# Patient Record
Sex: MALE | Race: WHITE | NOT HISPANIC OR LATINO | Employment: OTHER | URBAN - METROPOLITAN AREA
[De-identification: names, ages, dates, MRNs, and addresses within clinical notes are randomized per-mention and may not be internally consistent; named-entity substitution may affect disease eponyms.]

---

## 2017-04-06 ENCOUNTER — HISTORICAL (OUTPATIENT)
Dept: ADMINISTRATIVE | Facility: HOSPITAL | Age: 57
End: 2017-04-06

## 2017-06-27 ENCOUNTER — HISTORICAL (OUTPATIENT)
Dept: ADMINISTRATIVE | Facility: HOSPITAL | Age: 57
End: 2017-06-27

## 2017-06-27 LAB
ABS NEUT (OLG): 2.63 X10(3)/MCL (ref 2.1–9.2)
ABS NEUT (OLG): 2.71 X10(3)/MCL (ref 2.1–9.2)
ALBUMIN SERPL-MCNC: 4.4 GM/DL (ref 3.4–5)
ALBUMIN/GLOB SERPL: 1 RATIO (ref 1–2)
ALP SERPL-CCNC: 81 UNIT/L (ref 20–120)
ALT SERPL-CCNC: 19 UNIT/L
AST SERPL-CCNC: 20 UNIT/L
BASOPHILS # BLD AUTO: 0.02 X10(3)/MCL
BASOPHILS # BLD AUTO: 0.02 X10(3)/MCL
BASOPHILS NFR BLD AUTO: 0 % (ref 0–1)
BASOPHILS NFR BLD AUTO: 0 % (ref 0–1)
BILIRUB SERPL-MCNC: 0.4 MG/DL
BILIRUBIN DIRECT+TOT PNL SERPL-MCNC: 0.1 MG/DL
BILIRUBIN DIRECT+TOT PNL SERPL-MCNC: 0.3 MG/DL
BUN SERPL-MCNC: 24 MG/DL (ref 7–25)
CALCIUM SERPL-MCNC: 9.6 MG/DL (ref 8.4–10.3)
CD3+CD4+ CELLS # SPEC: 868 UNIT/L (ref 589–1505)
CD3+CD4+ CELLS NFR BLD: 36.8 % (ref 31–59)
CHLORIDE SERPL-SCNC: 100 MMOL/L (ref 96–110)
CO2 SERPL-SCNC: 26 MMOL/L (ref 24–32)
CREAT SERPL-MCNC: 0.78 MG/DL (ref 0.7–1.4)
EOSINOPHIL # BLD AUTO: 0.15 X10(3)/MCL
EOSINOPHIL # BLD AUTO: 0.16 X10(3)/MCL
EOSINOPHIL NFR BLD AUTO: 3 % (ref 0–5)
EOSINOPHIL NFR BLD AUTO: 3 % (ref 0–5)
ERYTHROCYTE [DISTWIDTH] IN BLOOD BY AUTOMATED COUNT: 11.3 % (ref 11.5–14.5)
ERYTHROCYTE [DISTWIDTH] IN BLOOD BY AUTOMATED COUNT: 11.6 % (ref 11.5–14.5)
GLOBULIN SER-MCNC: 3.5 GM/ML (ref 2.3–3.5)
GLUCOSE SERPL-MCNC: 101 MG/DL (ref 65–99)
HAV AB SER QL IA: REACTIVE
HCT VFR BLD AUTO: 41 % (ref 40–51)
HCT VFR BLD AUTO: 41.1 % (ref 40–51)
HGB BLD-MCNC: 14.6 GM/DL (ref 13.5–17.5)
HGB BLD-MCNC: 14.6 GM/DL (ref 13.5–17.5)
IMM GRANULOCYTES # BLD AUTO: 0.01 10*3/UL
IMM GRANULOCYTES NFR BLD AUTO: 0 %
LYMPHOCYTES # BLD AUTO: 2.36 X10(3)/MCL
LYMPHOCYTES # BLD AUTO: 2.36 X10(3)/MCL
LYMPHOCYTES # BLD AUTO: 2360 UNIT/L (ref 1260–5520)
LYMPHOCYTES NFR BLD AUTO: 40 % (ref 15–40)
LYMPHOCYTES NFR BLD AUTO: 40 % (ref 15–40)
LYMPHOCYTES NFR LN MANUAL: 40 % (ref 28–48)
LYMPHOMA - T-CELL MARKERS SPEC-IMP: NORMAL
MCH RBC QN AUTO: 40.2 PG (ref 26–34)
MCH RBC QN AUTO: 40.3 PG (ref 26–34)
MCHC RBC AUTO-ENTMCNC: 35.5 GM/DL (ref 31–37)
MCHC RBC AUTO-ENTMCNC: 35.6 GM/DL (ref 31–37)
MCV RBC AUTO: 113.2 FL (ref 80–100)
MCV RBC AUTO: 113.3 FL (ref 80–100)
MONOCYTES # BLD AUTO: 0.6 X10(3)/MCL
MONOCYTES # BLD AUTO: 0.67 X10(3)/MCL
MONOCYTES NFR BLD AUTO: 10 % (ref 4–12)
MONOCYTES NFR BLD AUTO: 12 % (ref 4–12)
NEUTROPHILS # BLD AUTO: 2.63 X10(3)/MCL
NEUTROPHILS # BLD AUTO: 2.71 X10(3)/MCL
NEUTROPHILS NFR BLD AUTO: 45 X10(3)/MCL
NEUTROPHILS NFR BLD AUTO: 46 X10(3)/MCL
PLATELET # BLD AUTO: 229 X10(3)/MCL (ref 130–400)
PLATELET # BLD AUTO: 231 X10(3)/MCL (ref 130–400)
PMV BLD AUTO: 9.1 FL (ref 7.4–10.4)
PMV BLD AUTO: 9.1 FL (ref 7.4–10.4)
POTASSIUM SERPL-SCNC: 4.3 MMOL/L (ref 3.6–5.2)
PROT SERPL-MCNC: 7.9 GM/DL (ref 6–8)
RBC # BLD AUTO: 3.62 X10(6)/MCL (ref 4.5–5.9)
RBC # BLD AUTO: 3.63 X10(6)/MCL (ref 4.5–5.9)
SODIUM SERPL-SCNC: 135 MMOL/L (ref 135–146)
WBC # BLD AUTO: 5900 /MM3 (ref 4500–11500)
WBC # SPEC AUTO: 5.8 X10(3)/MCL (ref 4.5–11)
WBC # SPEC AUTO: 5.9 X10(3)/MCL (ref 4.5–11)

## 2017-10-20 ENCOUNTER — HISTORICAL (OUTPATIENT)
Dept: INTERNAL MEDICINE | Facility: CLINIC | Age: 57
End: 2017-10-20

## 2017-10-20 LAB
BUN SERPL-MCNC: 24 MG/DL (ref 7–18)
CALCIUM SERPL-MCNC: 9.5 MG/DL (ref 8.5–10.1)
CHLORIDE SERPL-SCNC: 101 MMOL/L (ref 98–107)
CHOLEST SERPL-MCNC: 247 MG/DL
CHOLEST/HDLC SERPL: 4.9 {RATIO} (ref 0–5)
CO2 SERPL-SCNC: 27 MMOL/L (ref 21–32)
CREAT SERPL-MCNC: 0.9 MG/DL (ref 0.6–1.3)
DEPRECATED CALCIDIOL+CALCIFEROL SERPL-MC: 27.82 NG/ML (ref 30–80)
EST. AVERAGE GLUCOSE BLD GHB EST-MCNC: 114 MG/DL
GLUCOSE SERPL-MCNC: 98 MG/DL (ref 74–106)
HBA1C MFR BLD: 5.6 % (ref 4.2–6.3)
HDLC SERPL-MCNC: 50 MG/DL
LDLC SERPL CALC-MCNC: 146 MG/DL (ref 0–130)
POTASSIUM SERPL-SCNC: 4.4 MMOL/L (ref 3.5–5.1)
PSA SERPL-MCNC: 1 NG/ML
SODIUM SERPL-SCNC: 136 MMOL/L (ref 136–145)
TESTOST SERPL-MCNC: 287.1 NG/DL (ref 241–827)
TRIGL SERPL-MCNC: 256 MG/DL
VLDLC SERPL CALC-MCNC: 51 MG/DL

## 2017-10-25 ENCOUNTER — HISTORICAL (OUTPATIENT)
Dept: ADMINISTRATIVE | Facility: HOSPITAL | Age: 57
End: 2017-10-25

## 2017-10-25 LAB
ABS NEUT (OLG): 2.85 X10(3)/MCL (ref 2.1–9.2)
ABS NEUT (OLG): 3.05 X10(3)/MCL (ref 2.1–9.2)
ALBUMIN SERPL-MCNC: 4.5 GM/DL (ref 3.4–5)
ALBUMIN/GLOB SERPL: 1 RATIO (ref 1–2)
ALP SERPL-CCNC: 111 UNIT/L (ref 45–117)
ALT SERPL-CCNC: 30 UNIT/L (ref 12–78)
APPEARANCE, UA: CLEAR
AST SERPL-CCNC: 18 UNIT/L (ref 15–37)
BACTERIA #/AREA URNS AUTO: ABNORMAL /[HPF]
BASOPHILS # BLD AUTO: 0.02 X10(3)/MCL
BASOPHILS # BLD AUTO: 0.02 X10(3)/MCL
BASOPHILS NFR BLD AUTO: 0 % (ref 0–1)
BASOPHILS NFR BLD AUTO: 0 % (ref 0–1)
BILIRUB SERPL-MCNC: 0.4 MG/DL (ref 0.2–1)
BILIRUB UR QL STRIP: NEGATIVE
BILIRUBIN DIRECT+TOT PNL SERPL-MCNC: 0.1 MG/DL
BILIRUBIN DIRECT+TOT PNL SERPL-MCNC: 0.3 MG/DL
BUN SERPL-MCNC: 15 MG/DL (ref 7–18)
CALCIUM SERPL-MCNC: 9.5 MG/DL (ref 8.5–10.1)
CD3+CD4+ CELLS # SPEC: 1016 UNIT/L (ref 589–1505)
CD3+CD4+ CELLS NFR BLD: 38.5 % (ref 31–59)
CHLORIDE SERPL-SCNC: 102 MMOL/L (ref 98–107)
CO2 SERPL-SCNC: 27 MMOL/L (ref 21–32)
COLOR UR: ABNORMAL
CREAT SERPL-MCNC: 0.8 MG/DL (ref 0.6–1.3)
EOSINOPHIL # BLD AUTO: 0.21 X10(3)/MCL
EOSINOPHIL # BLD AUTO: 0.22 X10(3)/MCL
EOSINOPHIL NFR BLD AUTO: 3 % (ref 0–5)
EOSINOPHIL NFR BLD AUTO: 4 % (ref 0–5)
ERYTHROCYTE [DISTWIDTH] IN BLOOD BY AUTOMATED COUNT: 11.4 % (ref 11.5–14.5)
ERYTHROCYTE [DISTWIDTH] IN BLOOD BY AUTOMATED COUNT: 11.4 % (ref 11.5–14.5)
GLOBULIN SER-MCNC: 4.2 GM/ML (ref 2.3–3.5)
GLUCOSE (UA): NORMAL
GLUCOSE SERPL-MCNC: 99 MG/DL (ref 74–106)
HCT VFR BLD AUTO: 46 % (ref 40–51)
HCT VFR BLD AUTO: 46.2 % (ref 40–51)
HCV AB SERPL QL IA: NONREACTIVE
HGB BLD-MCNC: 16.8 GM/DL (ref 13.5–17.5)
HGB BLD-MCNC: 16.9 GM/DL (ref 13.5–17.5)
HGB UR QL STRIP: NEGATIVE
HYALINE CASTS #/AREA URNS LPF: ABNORMAL /[LPF]
IMM GRANULOCYTES # BLD AUTO: 0.01 10*3/UL
IMM GRANULOCYTES # BLD AUTO: 0.01 10*3/UL
IMM GRANULOCYTES NFR BLD AUTO: 0 %
IMM GRANULOCYTES NFR BLD AUTO: 0 %
KETONES UR QL STRIP: NEGATIVE
LEUKOCYTE ESTERASE UR QL STRIP: NEGATIVE
LYMPHOCYTES # BLD AUTO: 2.38 X10(3)/MCL
LYMPHOCYTES # BLD AUTO: 2.64 X10(3)/MCL
LYMPHOCYTES # BLD AUTO: 2640 UNIT/L (ref 1260–5520)
LYMPHOCYTES NFR BLD AUTO: 39 % (ref 15–40)
LYMPHOCYTES NFR BLD AUTO: 40 % (ref 15–40)
LYMPHOCYTES NFR LN MANUAL: 40 % (ref 28–48)
LYMPHOMA - T-CELL MARKERS SPEC-IMP: NORMAL
MCH RBC QN AUTO: 41.5 PG (ref 26–34)
MCH RBC QN AUTO: 41.9 PG (ref 26–34)
MCHC RBC AUTO-ENTMCNC: 36.4 GM/DL (ref 31–37)
MCHC RBC AUTO-ENTMCNC: 36.7 GM/DL (ref 31–37)
MCV RBC AUTO: 114.1 FL (ref 80–100)
MCV RBC AUTO: 114.1 FL (ref 80–100)
MONOCYTES # BLD AUTO: 0.62 X10(3)/MCL
MONOCYTES # BLD AUTO: 0.71 X10(3)/MCL
MONOCYTES NFR BLD AUTO: 10 % (ref 4–12)
MONOCYTES NFR BLD AUTO: 11 % (ref 4–12)
NEUTROPHILS # BLD AUTO: 2.85 X10(3)/MCL
NEUTROPHILS # BLD AUTO: 3.05 X10(3)/MCL
NEUTROPHILS NFR BLD AUTO: 46 X10(3)/MCL
NEUTROPHILS NFR BLD AUTO: 47 X10(3)/MCL
NITRITE UR QL STRIP: NEGATIVE
PH UR STRIP: 5.5 [PH] (ref 4.5–8)
PLATELET # BLD AUTO: 209 X10(3)/MCL (ref 130–400)
PLATELET # BLD AUTO: 215 X10(3)/MCL (ref 130–400)
PMV BLD AUTO: 9.3 FL (ref 7.4–10.4)
PMV BLD AUTO: 9.4 FL (ref 7.4–10.4)
POTASSIUM SERPL-SCNC: 4 MMOL/L (ref 3.5–5.1)
PROT SERPL-MCNC: 8.7 GM/DL (ref 6.4–8.2)
PROT UR QL STRIP: NEGATIVE
RBC # BLD AUTO: 4.03 X10(6)/MCL (ref 4.5–5.9)
RBC # BLD AUTO: 4.05 X10(6)/MCL (ref 4.5–5.9)
RBC #/AREA URNS AUTO: ABNORMAL /[HPF]
SODIUM SERPL-SCNC: 134 MMOL/L (ref 136–145)
SP GR UR STRIP: 1 (ref 1–1.03)
SQUAMOUS #/AREA URNS LPF: ABNORMAL /[LPF]
T PALLIDUM AB SER QL: NONREACTIVE
TSH SERPL-ACNC: 1.16 MIU/L (ref 0.36–3.74)
UROBILINOGEN UR STRIP-ACNC: NORMAL MG/DL
WBC # BLD AUTO: 6600 /MM3 (ref 4500–11500)
WBC # SPEC AUTO: 6.1 X10(3)/MCL (ref 4.5–11)
WBC # SPEC AUTO: 6.6 X10(3)/MCL (ref 4.5–11)
WBC #/AREA URNS AUTO: ABNORMAL /HPF

## 2018-03-01 ENCOUNTER — HISTORICAL (OUTPATIENT)
Dept: ADMINISTRATIVE | Facility: HOSPITAL | Age: 58
End: 2018-03-01

## 2018-03-01 LAB
ABS NEUT (OLG): 2.52 X10(3)/MCL (ref 2.1–9.2)
ABS NEUT (OLG): 2.57 X10(3)/MCL (ref 2.1–9.2)
ALBUMIN SERPL-MCNC: 3.8 GM/DL (ref 3.4–5)
ALBUMIN/GLOB SERPL: 1 RATIO (ref 1–2)
ALP SERPL-CCNC: 89 UNIT/L (ref 45–117)
ALT SERPL-CCNC: 26 UNIT/L (ref 12–78)
AST SERPL-CCNC: 17 UNIT/L (ref 15–37)
BASOPHILS # BLD AUTO: 0.02 X10(3)/MCL
BASOPHILS # BLD AUTO: 0.02 X10(3)/MCL
BASOPHILS NFR BLD AUTO: 0 %
BASOPHILS NFR BLD AUTO: 0 %
BILIRUB SERPL-MCNC: 0.4 MG/DL (ref 0.2–1)
BILIRUBIN DIRECT+TOT PNL SERPL-MCNC: <0.1 MG/DL
BILIRUBIN DIRECT+TOT PNL SERPL-MCNC: ABNORMAL MG/DL
BUN SERPL-MCNC: 12 MG/DL (ref 7–18)
CALCIUM SERPL-MCNC: 9.1 MG/DL (ref 8.5–10.1)
CD3+CD4+ CELLS # SPEC: 802 UNIT/L (ref 589–1505)
CD3+CD4+ CELLS NFR BLD: 37.4 % (ref 31–59)
CHLORIDE SERPL-SCNC: 104 MMOL/L (ref 98–107)
CO2 SERPL-SCNC: 27 MMOL/L (ref 21–32)
CREAT SERPL-MCNC: 0.8 MG/DL (ref 0.6–1.3)
EOSINOPHIL # BLD AUTO: 0.14 X10(3)/MCL
EOSINOPHIL # BLD AUTO: 0.15 10*3/UL
EOSINOPHIL NFR BLD AUTO: 3 %
EOSINOPHIL NFR BLD AUTO: 3 %
ERYTHROCYTE [DISTWIDTH] IN BLOOD BY AUTOMATED COUNT: 12.5 % (ref 11.5–14.5)
ERYTHROCYTE [DISTWIDTH] IN BLOOD BY AUTOMATED COUNT: 12.6 % (ref 11.5–14.5)
GLOBULIN SER-MCNC: 3.8 GM/ML (ref 2.3–3.5)
GLUCOSE SERPL-MCNC: 87 MG/DL (ref 74–106)
HCT VFR BLD AUTO: 40.2 % (ref 40–51)
HCT VFR BLD AUTO: 40.3 % (ref 40–51)
HGB BLD-MCNC: 14.3 GM/DL (ref 13.5–17.5)
HGB BLD-MCNC: 14.4 GM/DL (ref 13.5–17.5)
IMM GRANULOCYTES # BLD AUTO: 0.01 10*3/UL
IMM GRANULOCYTES # BLD AUTO: 0.01 10*3/UL
IMM GRANULOCYTES NFR BLD AUTO: 0 %
IMM GRANULOCYTES NFR BLD AUTO: 0 %
LYMPHOCYTES # BLD AUTO: 2.12 X10(3)/MCL
LYMPHOCYTES # BLD AUTO: 2.15 X10(3)/MCL
LYMPHOCYTES # BLD AUTO: 2145 UNIT/L (ref 1260–5520)
LYMPHOCYTES NFR BLD AUTO: 39 % (ref 13–40)
LYMPHOCYTES NFR BLD AUTO: 40 % (ref 13–40)
LYMPHOCYTES NFR LN MANUAL: 39 % (ref 28–48)
LYMPHOMA - T-CELL MARKERS SPEC-IMP: NORMAL
MCH RBC QN AUTO: 41.6 PG (ref 26–34)
MCH RBC QN AUTO: 41.7 PG (ref 26–34)
MCHC RBC AUTO-ENTMCNC: 35.6 GM/DL (ref 31–37)
MCHC RBC AUTO-ENTMCNC: 35.7 GM/DL (ref 31–37)
MCV RBC AUTO: 116.8 FL (ref 80–100)
MCV RBC AUTO: 116.9 FL (ref 80–100)
MONOCYTES # BLD AUTO: 0.51 X10(3)/MCL
MONOCYTES # BLD AUTO: 0.58 X10(3)/MCL
MONOCYTES NFR BLD AUTO: 10 % (ref 4–12)
MONOCYTES NFR BLD AUTO: 11 % (ref 4–12)
NEUTROPHILS # BLD AUTO: 2.52 X10(3)/MCL
NEUTROPHILS # BLD AUTO: 2.57 X10(3)/MCL
NEUTROPHILS NFR BLD AUTO: 47 X10(3)/MCL
NEUTROPHILS NFR BLD AUTO: 47 X10(3)/MCL
PLATELET # BLD AUTO: 215 X10(3)/MCL (ref 130–400)
PLATELET # BLD AUTO: 217 X10(3)/MCL (ref 130–400)
PMV BLD AUTO: 9.5 FL (ref 7.4–10.4)
PMV BLD AUTO: 9.7 FL (ref 7.4–10.4)
POTASSIUM SERPL-SCNC: 4.2 MMOL/L (ref 3.5–5.1)
PROT SERPL-MCNC: 7.6 GM/DL (ref 6.4–8.2)
RBC # BLD AUTO: 3.44 X10(6)/MCL (ref 4.5–5.9)
RBC # BLD AUTO: 3.45 X10(6)/MCL (ref 4.5–5.9)
SODIUM SERPL-SCNC: 139 MMOL/L (ref 136–145)
WBC # BLD AUTO: 5500 /MM3 (ref 4500–11500)
WBC # SPEC AUTO: 5.3 X10(3)/MCL (ref 4.5–11)
WBC # SPEC AUTO: 5.5 X10(3)/MCL (ref 4.5–11)

## 2018-06-15 ENCOUNTER — HISTORICAL (OUTPATIENT)
Dept: INTERNAL MEDICINE | Facility: CLINIC | Age: 58
End: 2018-06-15

## 2018-06-15 LAB
CHOLEST SERPL-MCNC: 252 MG/DL
CHOLEST/HDLC SERPL: 6 {RATIO} (ref 0–5)
HDLC SERPL-MCNC: 42 MG/DL
LDLC SERPL CALC-MCNC: 159 MG/DL (ref 0–130)
TRIGL SERPL-MCNC: 257 MG/DL
VLDLC SERPL CALC-MCNC: 51 MG/DL

## 2018-08-30 ENCOUNTER — HISTORICAL (OUTPATIENT)
Dept: ADMINISTRATIVE | Facility: HOSPITAL | Age: 58
End: 2018-08-30

## 2018-08-30 LAB
ABS NEUT (OLG): 2.7 X10(3)/MCL
ALBUMIN SERPL-MCNC: 4 GM/DL (ref 3.4–5)
ALBUMIN/GLOB SERPL: 1 RATIO (ref 1–2)
ALP SERPL-CCNC: 91 UNIT/L (ref 45–117)
ALT SERPL-CCNC: 22 UNIT/L (ref 12–78)
APPEARANCE, UA: CLEAR
AST SERPL-CCNC: 14 UNIT/L (ref 15–37)
BACTERIA #/AREA URNS AUTO: ABNORMAL /[HPF]
BASOPHILS # BLD AUTO: 0.02 X10(3)/MCL
BASOPHILS NFR BLD AUTO: 0 %
BILIRUB SERPL-MCNC: 0.4 MG/DL (ref 0.2–1)
BILIRUB UR QL STRIP: NEGATIVE
BILIRUBIN DIRECT+TOT PNL SERPL-MCNC: 0.1 MG/DL
BILIRUBIN DIRECT+TOT PNL SERPL-MCNC: 0.3 MG/DL
BUN SERPL-MCNC: 15 MG/DL (ref 7–18)
CALCIUM SERPL-MCNC: 8.8 MG/DL (ref 8.5–10.1)
CD3+CD4+ CELLS # SPEC: 681 UNIT/L (ref 589–1505)
CD3+CD4+ CELLS NFR BLD: 37.4 % (ref 31–59)
CHLORIDE SERPL-SCNC: 103 MMOL/L (ref 98–107)
CO2 SERPL-SCNC: 27 MMOL/L (ref 21–32)
COLOR UR: COLORLESS
CREAT SERPL-MCNC: 0.8 MG/DL (ref 0.6–1.3)
EOSINOPHIL # BLD AUTO: 0.09 X10(3)/MCL
EOSINOPHIL NFR BLD AUTO: 2 %
ERYTHROCYTE [DISTWIDTH] IN BLOOD BY AUTOMATED COUNT: 11.5 % (ref 11.5–14.5)
GLOBULIN SER-MCNC: 3.6 GM/ML (ref 2.3–3.5)
GLUCOSE (UA): NORMAL
GLUCOSE SERPL-MCNC: 110 MG/DL (ref 74–106)
HCT VFR BLD AUTO: 42.4 % (ref 40–51)
HGB BLD-MCNC: 15.2 GM/DL (ref 13.5–17.5)
HGB UR QL STRIP: NEGATIVE
HYALINE CASTS #/AREA URNS LPF: ABNORMAL /[LPF]
HYPOCHROMIA BLD QL SMEAR: NORMAL
IMM GRANULOCYTES # BLD AUTO: 0.01 10*3/UL
IMM GRANULOCYTES NFR BLD AUTO: 0 %
KETONES UR QL STRIP: NEGATIVE
LEUKOCYTE ESTERASE UR QL STRIP: NEGATIVE
LYMPHOCYTES # BLD AUTO: 1.84 X10(3)/MCL
LYMPHOCYTES # BLD AUTO: 1820 UNIT/L (ref 1260–5520)
LYMPHOCYTES NFR BLD AUTO: 35 % (ref 13–40)
LYMPHOCYTES NFR LN MANUAL: 35 % (ref 28–48)
LYMPHOMA - T-CELL MARKERS SPEC-IMP: NORMAL
MACROCYTES BLD QL SMEAR: NORMAL
MCH RBC QN AUTO: 42 PG (ref 26–34)
MCHC RBC AUTO-ENTMCNC: 35.8 GM/DL (ref 31–37)
MCV RBC AUTO: 117.1 FL (ref 80–100)
MONOCYTES # BLD AUTO: 0.54 X10(3)/MCL
MONOCYTES NFR BLD AUTO: 10 % (ref 4–12)
NEUTROPHILS # BLD AUTO: 2.7 X10(3)/MCL
NEUTROPHILS NFR BLD AUTO: 52 %
NITRITE UR QL STRIP: NEGATIVE
PH UR STRIP: 5.5 [PH] (ref 4.5–8)
PLATELET # BLD AUTO: 209 X10(3)/MCL (ref 130–400)
PLATELET # BLD EST: ADEQUATE 10*3/UL
PMV BLD AUTO: 9.9 FL (ref 7.4–10.4)
POLYCHROMASIA BLD QL SMEAR: NORMAL
POTASSIUM SERPL-SCNC: 3.8 MMOL/L (ref 3.5–5.1)
PROT SERPL-MCNC: 7.6 GM/DL (ref 6.4–8.2)
PROT UR QL STRIP: NEGATIVE
RBC # BLD AUTO: 3.62 X10(6)/MCL (ref 4.5–5.9)
RBC #/AREA URNS AUTO: ABNORMAL /[HPF]
RBC MORPH BLD: NORMAL
SODIUM SERPL-SCNC: 137 MMOL/L (ref 136–145)
SP GR UR STRIP: 1 (ref 1–1.03)
SQUAMOUS #/AREA URNS LPF: <1 /[LPF]
T PALLIDUM AB SER QL: NONREACTIVE
UROBILINOGEN UR STRIP-ACNC: NORMAL
WBC # BLD AUTO: 5200 /MM3 (ref 4500–11500)
WBC # SPEC AUTO: 5.2 X10(3)/MCL (ref 4.5–11)
WBC #/AREA URNS AUTO: ABNORMAL /HPF

## 2018-11-21 ENCOUNTER — HISTORICAL (OUTPATIENT)
Dept: LAB | Facility: HOSPITAL | Age: 58
End: 2018-11-21

## 2018-11-21 LAB
ALBUMIN SERPL-MCNC: 4.2 GM/DL (ref 3.4–5)
ALBUMIN/GLOB SERPL: 1 RATIO (ref 1–2)
ALP SERPL-CCNC: 113 UNIT/L (ref 45–117)
ALT SERPL-CCNC: 27 UNIT/L (ref 12–78)
AST SERPL-CCNC: 12 UNIT/L (ref 15–37)
BILIRUB SERPL-MCNC: 0.5 MG/DL (ref 0.2–1)
BILIRUBIN DIRECT+TOT PNL SERPL-MCNC: 0.1 MG/DL
BILIRUBIN DIRECT+TOT PNL SERPL-MCNC: 0.4 MG/DL
BUN SERPL-MCNC: 21 MG/DL (ref 7–18)
CALCIUM SERPL-MCNC: 9.1 MG/DL (ref 8.5–10.1)
CHLORIDE SERPL-SCNC: 102 MMOL/L (ref 98–107)
CHOLEST SERPL-MCNC: 212 MG/DL
CHOLEST/HDLC SERPL: 5 {RATIO} (ref 0–5)
CO2 SERPL-SCNC: 27 MMOL/L (ref 21–32)
CREAT SERPL-MCNC: 0.9 MG/DL (ref 0.6–1.3)
GLOBULIN SER-MCNC: 3.8 GM/ML (ref 2.3–3.5)
GLUCOSE SERPL-MCNC: 102 MG/DL (ref 74–106)
HDLC SERPL-MCNC: 42 MG/DL
LDLC SERPL CALC-MCNC: 144 MG/DL (ref 0–130)
POTASSIUM SERPL-SCNC: 4.1 MMOL/L (ref 3.5–5.1)
PROT SERPL-MCNC: 8 GM/DL (ref 6.4–8.2)
PSA SERPL-MCNC: 1 NG/ML
SODIUM SERPL-SCNC: 137 MMOL/L (ref 136–145)
TESTOST SERPL-MCNC: 325 NG/DL (ref 241–827)
TRIGL SERPL-MCNC: 130 MG/DL
VLDLC SERPL CALC-MCNC: 26 MG/DL

## 2018-12-20 ENCOUNTER — HISTORICAL (OUTPATIENT)
Dept: ADMINISTRATIVE | Facility: HOSPITAL | Age: 58
End: 2018-12-20

## 2018-12-20 LAB
ABS NEUT (OLG): 2.49 X10(3)/MCL (ref 2.1–9.2)
ALBUMIN SERPL-MCNC: 4 GM/DL (ref 3.4–5)
ALBUMIN/GLOB SERPL: 1 RATIO (ref 1–2)
ALP SERPL-CCNC: 118 UNIT/L (ref 45–117)
ALT SERPL-CCNC: 44 UNIT/L (ref 12–78)
AST SERPL-CCNC: 28 UNIT/L (ref 15–37)
BASOPHILS # BLD AUTO: 0.02 X10(3)/MCL
BASOPHILS NFR BLD AUTO: 0 %
BILIRUB SERPL-MCNC: 0.3 MG/DL (ref 0.2–1)
BILIRUBIN DIRECT+TOT PNL SERPL-MCNC: 0.1 MG/DL
BILIRUBIN DIRECT+TOT PNL SERPL-MCNC: 0.2 MG/DL
BUN SERPL-MCNC: 20 MG/DL (ref 7–18)
CALCIUM SERPL-MCNC: 9.1 MG/DL (ref 8.5–10.1)
CD3+CD4+ CELLS # SPEC: 760 UNIT/L (ref 589–1505)
CD3+CD4+ CELLS NFR BLD: 36.1 % (ref 31–59)
CHLORIDE SERPL-SCNC: 102 MMOL/L (ref 98–107)
CO2 SERPL-SCNC: 26 MMOL/L (ref 21–32)
CREAT SERPL-MCNC: 0.8 MG/DL (ref 0.6–1.3)
EOSINOPHIL # BLD AUTO: 0.12 10*3/UL
EOSINOPHIL NFR BLD AUTO: 2 %
ERYTHROCYTE [DISTWIDTH] IN BLOOD BY AUTOMATED COUNT: 11.8 % (ref 11.5–14.5)
GLOBULIN SER-MCNC: 3.7 GM/ML (ref 2.3–3.5)
GLUCOSE SERPL-MCNC: 82 MG/DL (ref 74–106)
HCT VFR BLD AUTO: 43.4 % (ref 40–51)
HGB BLD-MCNC: 15.3 GM/DL (ref 13.5–17.5)
IMM GRANULOCYTES # BLD AUTO: 0.02 10*3/UL
IMM GRANULOCYTES NFR BLD AUTO: 0 %
LYMPHOCYTES # BLD AUTO: 2.1 X10(3)/MCL
LYMPHOCYTES # BLD AUTO: 2106 UNIT/L (ref 1260–5520)
LYMPHOCYTES NFR BLD AUTO: 39 % (ref 13–40)
LYMPHOCYTES NFR LN MANUAL: 39 % (ref 28–48)
LYMPHOMA - T-CELL MARKERS SPEC-IMP: NORMAL
MCH RBC QN AUTO: 41 PG (ref 26–34)
MCHC RBC AUTO-ENTMCNC: 35.3 GM/DL (ref 31–37)
MCV RBC AUTO: 116.4 FL (ref 80–100)
MONOCYTES # BLD AUTO: 0.63 X10(3)/MCL
MONOCYTES NFR BLD AUTO: 12 % (ref 4–12)
NEUTROPHILS # BLD AUTO: 2.49 X10(3)/MCL
NEUTROPHILS NFR BLD AUTO: 46 X10(3)/MCL
PLATELET # BLD AUTO: 266 X10(3)/MCL (ref 130–400)
PMV BLD AUTO: 9.5 FL (ref 7.4–10.4)
POTASSIUM SERPL-SCNC: 3.9 MMOL/L (ref 3.5–5.1)
PROT SERPL-MCNC: 7.7 GM/DL (ref 6.4–8.2)
RBC # BLD AUTO: 3.73 X10(6)/MCL (ref 4.5–5.9)
SODIUM SERPL-SCNC: 137 MMOL/L (ref 136–145)
WBC # BLD AUTO: 5400 /MM3 (ref 4500–11500)
WBC # SPEC AUTO: 5.4 X10(3)/MCL (ref 4.5–11)

## 2019-02-01 ENCOUNTER — HISTORICAL (OUTPATIENT)
Dept: ADMINISTRATIVE | Facility: HOSPITAL | Age: 59
End: 2019-02-01

## 2019-06-20 ENCOUNTER — HISTORICAL (OUTPATIENT)
Dept: ADMINISTRATIVE | Facility: HOSPITAL | Age: 59
End: 2019-06-20

## 2019-06-20 LAB
ABS NEUT (OLG): 2.88 X10(3)/MCL (ref 2.1–9.2)
ALBUMIN SERPL-MCNC: 4.4 GM/DL (ref 3.4–5)
ALBUMIN/GLOB SERPL: 1.2 RATIO (ref 1.1–2)
ALP SERPL-CCNC: 108 UNIT/L (ref 45–117)
ALT SERPL-CCNC: 26 UNIT/L (ref 12–78)
APPEARANCE, UA: CLEAR
AST SERPL-CCNC: 11 UNIT/L (ref 15–37)
BACTERIA #/AREA URNS AUTO: ABNORMAL /[HPF]
BASOPHILS # BLD AUTO: 0.01 X10(3)/MCL
BASOPHILS NFR BLD AUTO: 0 %
BILIRUB SERPL-MCNC: 0.6 MG/DL (ref 0.2–1)
BILIRUB UR QL STRIP: NEGATIVE
BILIRUBIN DIRECT+TOT PNL SERPL-MCNC: 0.2 MG/DL
BILIRUBIN DIRECT+TOT PNL SERPL-MCNC: 0.4 MG/DL
BUN SERPL-MCNC: 22 MG/DL (ref 7–18)
CALCIUM SERPL-MCNC: 9.8 MG/DL (ref 8.5–10.1)
CD3+CD4+ CELLS # SPEC: 776 UNIT/L (ref 589–1505)
CD3+CD4+ CELLS NFR BLD: 35.2 % (ref 31–59)
CHLORIDE SERPL-SCNC: 103 MMOL/L (ref 98–107)
CO2 SERPL-SCNC: 27 MMOL/L (ref 21–32)
COLOR UR: YELLOW
CREAT SERPL-MCNC: 0.9 MG/DL (ref 0.6–1.3)
EOSINOPHIL # BLD AUTO: 0.09 X10(3)/MCL
EOSINOPHIL NFR BLD AUTO: 2 %
ERYTHROCYTE [DISTWIDTH] IN BLOOD BY AUTOMATED COUNT: 12.1 % (ref 11.5–14.5)
GLOBULIN SER-MCNC: 3.8 GM/ML (ref 2.3–3.5)
GLUCOSE (UA): NORMAL
GLUCOSE SERPL-MCNC: 98 MG/DL (ref 74–106)
HCT VFR BLD AUTO: 46.1 % (ref 40–51)
HCV AB SERPL QL IA: NONREACTIVE
HGB BLD-MCNC: 16 GM/DL (ref 13.5–17.5)
HGB UR QL STRIP: NEGATIVE
HYALINE CASTS #/AREA URNS LPF: ABNORMAL /[LPF]
IMM GRANULOCYTES # BLD AUTO: 0.01 10*3/UL
IMM GRANULOCYTES NFR BLD AUTO: 0 %
KETONES UR QL STRIP: NEGATIVE
LEUKOCYTE ESTERASE UR QL STRIP: NEGATIVE
LYMPHOCYTES # BLD AUTO: 2.17 X10(3)/MCL
LYMPHOCYTES # BLD AUTO: 2204 UNIT/L (ref 1260–5520)
LYMPHOCYTES NFR BLD AUTO: 38 % (ref 13–40)
LYMPHOCYTES NFR LN MANUAL: 38 % (ref 28–48)
LYMPHOMA - T-CELL MARKERS SPEC-IMP: NORMAL
MCH RBC QN AUTO: 40.9 PG (ref 26–34)
MCHC RBC AUTO-ENTMCNC: 34.7 GM/DL (ref 31–37)
MCV RBC AUTO: 117.9 FL (ref 80–100)
MONOCYTES # BLD AUTO: 0.59 X10(3)/MCL
MONOCYTES NFR BLD AUTO: 10 % (ref 4–12)
NEG CONT SPOT COUNT: NORMAL
NEUTROPHILS # BLD AUTO: 2.88 X10(3)/MCL
NEUTROPHILS NFR BLD AUTO: 50 X10(3)/MCL
NITRITE UR QL STRIP: NEGATIVE
PANEL A SPOT COUNT: 0
PANEL B SPOT COUNT: 1
PH UR STRIP: 5.5 [PH] (ref 4.5–8)
PLATELET # BLD AUTO: 186 X10(3)/MCL (ref 130–400)
PMV BLD AUTO: 9.6 FL (ref 7.4–10.4)
POS CONT SPOT COUNT: NORMAL
POTASSIUM SERPL-SCNC: 4.3 MMOL/L (ref 3.5–5.1)
PROT SERPL-MCNC: 8.2 GM/DL (ref 6.4–8.2)
PROT UR QL STRIP: 50 MG/DL
RBC # BLD AUTO: 3.91 X10(6)/MCL (ref 4.5–5.9)
RBC #/AREA URNS AUTO: ABNORMAL /[HPF]
SODIUM SERPL-SCNC: 137 MMOL/L (ref 136–145)
SP GR UR STRIP: 1.04 (ref 1–1.03)
SQUAMOUS #/AREA URNS LPF: ABNORMAL /[LPF]
T PALLIDUM AB SER QL: NONREACTIVE
T-SPOT.TB: NORMAL
UROBILINOGEN UR STRIP-ACNC: 2 MG/DL
WBC # BLD AUTO: 5800 /MM3 (ref 4500–11500)
WBC # SPEC AUTO: 5.8 X10(3)/MCL (ref 4.5–11)
WBC #/AREA URNS AUTO: ABNORMAL /HPF

## 2019-11-25 ENCOUNTER — HISTORICAL (OUTPATIENT)
Dept: LAB | Facility: HOSPITAL | Age: 59
End: 2019-11-25

## 2019-11-25 LAB
ALBUMIN SERPL-MCNC: 4.2 GM/DL (ref 3.4–5)
ALBUMIN/GLOB SERPL: 1.2 RATIO (ref 1.1–2)
ALP SERPL-CCNC: 100 UNIT/L (ref 45–117)
ALT SERPL-CCNC: 22 UNIT/L (ref 12–78)
AST SERPL-CCNC: 12 UNIT/L (ref 15–37)
BILIRUB SERPL-MCNC: 0.5 MG/DL (ref 0.2–1)
BILIRUBIN DIRECT+TOT PNL SERPL-MCNC: 0.1 MG/DL (ref 0–0.2)
BILIRUBIN DIRECT+TOT PNL SERPL-MCNC: 0.4 MG/DL
BUN SERPL-MCNC: 14 MG/DL (ref 7–18)
CALCIUM SERPL-MCNC: 9.3 MG/DL (ref 8.5–10.1)
CHLORIDE SERPL-SCNC: 103 MMOL/L (ref 98–107)
CHOLEST SERPL-MCNC: 261 MG/DL
CHOLEST/HDLC SERPL: 5.9 {RATIO} (ref 0–5)
CO2 SERPL-SCNC: 29 MMOL/L (ref 21–32)
CREAT SERPL-MCNC: 0.9 MG/DL (ref 0.6–1.3)
GLOBULIN SER-MCNC: 3.6 GM/ML (ref 2.3–3.5)
GLUCOSE SERPL-MCNC: 102 MG/DL (ref 74–106)
HDLC SERPL-MCNC: 44 MG/DL (ref 40–59)
LDLC SERPL CALC-MCNC: 182 MG/DL
POTASSIUM SERPL-SCNC: 4 MMOL/L (ref 3.5–5.1)
PROT SERPL-MCNC: 7.8 GM/DL (ref 6.4–8.2)
PSA SERPL-MCNC: 1.1 NG/ML
SODIUM SERPL-SCNC: 136 MMOL/L (ref 136–145)
TRIGL SERPL-MCNC: 173 MG/DL
VLDLC SERPL CALC-MCNC: 35 MG/DL

## 2019-12-06 ENCOUNTER — HISTORICAL (OUTPATIENT)
Dept: INTERNAL MEDICINE | Facility: CLINIC | Age: 59
End: 2019-12-06

## 2019-12-06 LAB
ABS NEUT (OLG): 1.96 X10(3)/MCL (ref 2.1–9.2)
ALBUMIN SERPL-MCNC: 4.1 GM/DL (ref 3.4–5)
ALBUMIN/GLOB SERPL: 1.1 RATIO (ref 1.1–2)
ALP SERPL-CCNC: 97 UNIT/L (ref 45–117)
ALT SERPL-CCNC: 28 UNIT/L (ref 12–78)
AST SERPL-CCNC: 14 UNIT/L (ref 15–37)
BASOPHILS # BLD AUTO: 0 X10(3)/MCL (ref 0–0.2)
BASOPHILS NFR BLD AUTO: 0 %
BILIRUB SERPL-MCNC: 0.5 MG/DL (ref 0.2–1)
BILIRUBIN DIRECT+TOT PNL SERPL-MCNC: 0.1 MG/DL (ref 0–0.2)
BILIRUBIN DIRECT+TOT PNL SERPL-MCNC: 0.4 MG/DL
BUN SERPL-MCNC: 17 MG/DL (ref 7–18)
CALCIUM SERPL-MCNC: 9.3 MG/DL (ref 8.5–10.1)
CD3+CD4+ CELLS # SPEC: 828 UNIT/L (ref 589–1505)
CD3+CD4+ CELLS NFR BLD: 33.8 % (ref 31–59)
CHLORIDE SERPL-SCNC: 104 MMOL/L (ref 98–107)
CO2 SERPL-SCNC: 29 MMOL/L (ref 21–32)
CREAT SERPL-MCNC: 0.9 MG/DL (ref 0.6–1.3)
EOSINOPHIL # BLD AUTO: 0.1 X10(3)/MCL (ref 0–0.9)
EOSINOPHIL NFR BLD AUTO: 2 %
ERYTHROCYTE [DISTWIDTH] IN BLOOD BY AUTOMATED COUNT: 11.3 % (ref 11.5–14.5)
GLOBULIN SER-MCNC: 3.6 GM/ML (ref 2.3–3.5)
GLUCOSE SERPL-MCNC: 99 MG/DL (ref 74–106)
HCT VFR BLD AUTO: 44.1 % (ref 40–51)
HGB BLD-MCNC: 15.3 GM/DL (ref 13.5–17.5)
IMM GRANULOCYTES # BLD AUTO: 0.01 10*3/UL
IMM GRANULOCYTES NFR BLD AUTO: 0 %
LYMPHOCYTES # BLD AUTO: 2.4 X10(3)/MCL (ref 0.6–4.6)
LYMPHOCYTES # BLD AUTO: 2450 UNIT/L (ref 1260–5520)
LYMPHOCYTES NFR BLD AUTO: 49 %
LYMPHOCYTES NFR LN MANUAL: 49 % (ref 28–48)
LYMPHOMA - T-CELL MARKERS SPEC-IMP: ABNORMAL
MCH RBC QN AUTO: 40.1 PG (ref 26–34)
MCHC RBC AUTO-ENTMCNC: 34.7 GM/DL (ref 31–37)
MCV RBC AUTO: 115.4 FL (ref 80–100)
MONOCYTES # BLD AUTO: 0.5 X10(3)/MCL (ref 0.1–1.3)
MONOCYTES NFR BLD AUTO: 10 %
NEUTROPHILS # BLD AUTO: 1.96 X10(3)/MCL (ref 2.1–9.2)
NEUTROPHILS NFR BLD AUTO: 39 %
PLATELET # BLD AUTO: 223 X10(3)/MCL (ref 130–400)
PMV BLD AUTO: 8.9 FL (ref 7.4–10.4)
POTASSIUM SERPL-SCNC: 3.9 MMOL/L (ref 3.5–5.1)
PROT SERPL-MCNC: 7.7 GM/DL (ref 6.4–8.2)
RBC # BLD AUTO: 3.82 X10(6)/MCL (ref 4.5–5.9)
SODIUM SERPL-SCNC: 138 MMOL/L (ref 136–145)
WBC # BLD AUTO: 5000 /MM3 (ref 4500–11500)
WBC # SPEC AUTO: 5 X10(3)/MCL (ref 4.5–11)

## 2019-12-26 ENCOUNTER — HISTORICAL (OUTPATIENT)
Dept: RADIOLOGY | Facility: HOSPITAL | Age: 59
End: 2019-12-26

## 2020-06-09 ENCOUNTER — HISTORICAL (OUTPATIENT)
Dept: ADMINISTRATIVE | Facility: HOSPITAL | Age: 60
End: 2020-06-09

## 2020-06-09 LAB
ABS NEUT (OLG): 2.3 X10(3)/MCL (ref 2.1–9.2)
ALBUMIN SERPL-MCNC: 4 GM/DL (ref 3.4–5)
ALBUMIN/GLOB SERPL: 1.1 RATIO (ref 1.1–2)
ALP SERPL-CCNC: 98 UNIT/L (ref 45–117)
ALT SERPL-CCNC: 28 UNIT/L (ref 12–78)
AST SERPL-CCNC: 11 UNIT/L (ref 15–37)
BASOPHILS # BLD AUTO: 0 X10(3)/MCL (ref 0–0.2)
BASOPHILS NFR BLD AUTO: 0 %
BILIRUB SERPL-MCNC: 0.4 MG/DL (ref 0.2–1)
BILIRUBIN DIRECT+TOT PNL SERPL-MCNC: 0.1 MG/DL (ref 0–0.2)
BILIRUBIN DIRECT+TOT PNL SERPL-MCNC: 0.3 MG/DL
BUN SERPL-MCNC: 19 MG/DL (ref 7–18)
CALCIUM SERPL-MCNC: 9.4 MG/DL (ref 8.5–10.1)
CD3+CD4+ CELLS # SPEC: 850 UNIT/L (ref 589–1505)
CD3+CD4+ CELLS NFR BLD: 35 % (ref 31–59)
CHLORIDE SERPL-SCNC: 105 MMOL/L (ref 98–107)
CHOLEST SERPL-MCNC: 293 MG/DL
CHOLEST/HDLC SERPL: 7.7 {RATIO} (ref 0–5)
CO2 SERPL-SCNC: 28 MMOL/L (ref 21–32)
CREAT SERPL-MCNC: 0.9 MG/DL (ref 0.6–1.3)
EOSINOPHIL # BLD AUTO: 0.2 X10(3)/MCL (ref 0–0.9)
EOSINOPHIL NFR BLD AUTO: 4 %
ERYTHROCYTE [DISTWIDTH] IN BLOOD BY AUTOMATED COUNT: 12.2 % (ref 11.5–14.5)
GLOBULIN SER-MCNC: 3.7 GM/ML (ref 2.3–3.5)
GLUCOSE SERPL-MCNC: 112 MG/DL (ref 74–106)
HCT VFR BLD AUTO: 43.5 % (ref 40–51)
HDLC SERPL-MCNC: 38 MG/DL (ref 40–59)
HGB BLD-MCNC: 15.6 GM/DL (ref 13.5–17.5)
IMM GRANULOCYTES # BLD AUTO: 0.01 10*3/UL
IMM GRANULOCYTES NFR BLD AUTO: 0 %
LDLC SERPL CALC-MCNC: 199 MG/DL
LYMPHOCYTES # BLD AUTO: 2.4 X10(3)/MCL (ref 0.6–4.6)
LYMPHOCYTES # BLD AUTO: 2430 UNIT/L (ref 1260–5520)
LYMPHOCYTES NFR BLD AUTO: 45 %
LYMPHOCYTES NFR LN MANUAL: 45 % (ref 28–48)
LYMPHOMA - T-CELL MARKERS SPEC-IMP: NORMAL
MCH RBC QN AUTO: 41.5 PG (ref 26–34)
MCHC RBC AUTO-ENTMCNC: 35.9 GM/DL (ref 31–37)
MCV RBC AUTO: 115.7 FL (ref 80–100)
MONOCYTES # BLD AUTO: 0.5 X10(3)/MCL (ref 0.1–1.3)
MONOCYTES NFR BLD AUTO: 9 %
NEUTROPHILS # BLD AUTO: 2.3 X10(3)/MCL (ref 2.1–9.2)
NEUTROPHILS NFR BLD AUTO: 43 %
PLATELET # BLD AUTO: 205 X10(3)/MCL (ref 130–400)
PMV BLD AUTO: 8.9 FL (ref 7.4–10.4)
POTASSIUM SERPL-SCNC: 3.9 MMOL/L (ref 3.5–5.1)
PROT SERPL-MCNC: 7.7 GM/DL (ref 6.4–8.2)
RBC # BLD AUTO: 3.76 X10(6)/MCL (ref 4.5–5.9)
SODIUM SERPL-SCNC: 136 MMOL/L (ref 136–145)
TESTOST SERPL-MCNC: 430.27 NG/DL (ref 220.91–715.81)
TRIGL SERPL-MCNC: 278 MG/DL
VLDLC SERPL CALC-MCNC: 56 MG/DL
WBC # BLD AUTO: 5400 /MM3 (ref 4500–11500)
WBC # SPEC AUTO: 5.4 X10(3)/MCL (ref 4.5–11)

## 2020-10-12 ENCOUNTER — HISTORICAL (OUTPATIENT)
Dept: LAB | Facility: HOSPITAL | Age: 60
End: 2020-10-12

## 2020-10-12 LAB
ABS NEUT (OLG): 1.95 X10(3)/MCL (ref 2.1–9.2)
ALBUMIN SERPL-MCNC: 3.8 GM/DL (ref 3.4–5)
ALBUMIN/GLOB SERPL: 1.1 RATIO (ref 1.1–2)
ALP SERPL-CCNC: 77 UNIT/L (ref 45–117)
ALT SERPL-CCNC: 22 UNIT/L (ref 12–78)
ANISOCYTOSIS BLD QL SMEAR: ABNORMAL
AST SERPL-CCNC: 11 UNIT/L (ref 15–37)
BASOPHILS NFR BLD MANUAL: 0 %
BILIRUB SERPL-MCNC: 0.5 MG/DL (ref 0.2–1)
BILIRUBIN DIRECT+TOT PNL SERPL-MCNC: 0.1 MG/DL (ref 0–0.2)
BILIRUBIN DIRECT+TOT PNL SERPL-MCNC: 0.4 MG/DL
BUN SERPL-MCNC: 14 MG/DL (ref 7–18)
CALCIUM SERPL-MCNC: 9 MG/DL (ref 8.5–10.1)
CHLORIDE SERPL-SCNC: 105 MMOL/L (ref 98–107)
CHOLEST SERPL-MCNC: 232 MG/DL
CHOLEST/HDLC SERPL: 5.5 {RATIO} (ref 0–5)
CO2 SERPL-SCNC: 27 MMOL/L (ref 21–32)
CREAT SERPL-MCNC: 0.8 MG/DL (ref 0.6–1.3)
EOSINOPHIL NFR BLD MANUAL: 1 %
ERYTHROCYTE [DISTWIDTH] IN BLOOD BY AUTOMATED COUNT: 12.2 % (ref 11.5–14.5)
GLOBULIN SER-MCNC: 3.6 GM/ML (ref 2.3–3.5)
GLUCOSE SERPL-MCNC: 102 MG/DL (ref 74–106)
GRANULOCYTES NFR BLD MANUAL: 41 % (ref 43–75)
HCT VFR BLD AUTO: 38.4 % (ref 40–51)
HDLC SERPL-MCNC: 42 MG/DL (ref 40–59)
HGB BLD-MCNC: 13.6 GM/DL (ref 13.5–17.5)
LDLC SERPL CALC-MCNC: 156 MG/DL
LYMPHOCYTES NFR BLD MANUAL: 52 % (ref 20.5–51.1)
MCH RBC QN AUTO: 42 PG (ref 26–34)
MCHC RBC AUTO-ENTMCNC: 35.4 GM/DL (ref 31–37)
MCV RBC AUTO: 118.5 FL (ref 80–100)
MONOCYTES NFR BLD MANUAL: 6 % (ref 2–9)
PLATELET # BLD AUTO: 208 X10(3)/MCL (ref 130–400)
PLATELET # BLD EST: ADEQUATE 10*3/UL
PMV BLD AUTO: 8.7 FL (ref 7.4–10.4)
POLYCHROMASIA BLD QL SMEAR: ABNORMAL
POTASSIUM SERPL-SCNC: 3.8 MMOL/L (ref 3.5–5.1)
PROT SERPL-MCNC: 7.4 GM/DL (ref 6.4–8.2)
RBC # BLD AUTO: 3.24 X10(6)/MCL (ref 4.5–5.9)
SODIUM SERPL-SCNC: 139 MMOL/L (ref 136–145)
TRIGL SERPL-MCNC: 168 MG/DL
VLDLC SERPL CALC-MCNC: 34 MG/DL
WBC # SPEC AUTO: 5.2 X10(3)/MCL (ref 4.5–11)

## 2020-12-07 ENCOUNTER — HISTORICAL (OUTPATIENT)
Dept: ADMINISTRATIVE | Facility: HOSPITAL | Age: 60
End: 2020-12-07

## 2020-12-07 LAB
ABS NEUT (OLG): 2.32 X10(3)/MCL (ref 2.1–9.2)
ALBUMIN SERPL-MCNC: 4.7 GM/DL (ref 3.4–4.8)
ALBUMIN/GLOB SERPL: 1.4 RATIO (ref 1.1–2)
ALP SERPL-CCNC: 76 UNIT/L (ref 40–150)
ALT SERPL-CCNC: 20 UNIT/L (ref 0–55)
APPEARANCE, UA: CLEAR
AST SERPL-CCNC: 15 UNIT/L (ref 5–34)
BACTERIA #/AREA URNS AUTO: ABNORMAL /HPF
BASOPHILS # BLD AUTO: 0 X10(3)/MCL (ref 0–0.2)
BASOPHILS NFR BLD AUTO: 0 %
BILIRUB SERPL-MCNC: 0.6 MG/DL
BILIRUB UR QL STRIP: NEGATIVE
BILIRUBIN DIRECT+TOT PNL SERPL-MCNC: 0.2 MG/DL (ref 0–0.5)
BILIRUBIN DIRECT+TOT PNL SERPL-MCNC: 0.4 MG/DL (ref 0–0.8)
BUN SERPL-MCNC: 17 MG/DL (ref 8.4–25.7)
CALCIUM SERPL-MCNC: 9.8 MG/DL (ref 8.8–10)
CHLORIDE SERPL-SCNC: 103 MMOL/L (ref 98–107)
CO2 SERPL-SCNC: 26 MMOL/L (ref 23–31)
COLOR UR: NORMAL
CREAT SERPL-MCNC: 0.88 MG/DL (ref 0.73–1.18)
DEPRECATED CALCIDIOL+CALCIFEROL SERPL-MC: 41.2 NG/ML (ref 30–80)
EOSINOPHIL # BLD AUTO: 0.1 X10(3)/MCL (ref 0–0.9)
EOSINOPHIL NFR BLD AUTO: 2 %
ERYTHROCYTE [DISTWIDTH] IN BLOOD BY AUTOMATED COUNT: 11.9 % (ref 11.5–14.5)
EST. AVERAGE GLUCOSE BLD GHB EST-MCNC: 102.5 MG/DL
GLOBULIN SER-MCNC: 3.3 GM/DL (ref 2.4–3.5)
GLUCOSE (UA): NEGATIVE
GLUCOSE SERPL-MCNC: 98 MG/DL (ref 82–115)
HBA1C MFR BLD: 5.2 %
HCT VFR BLD AUTO: 43.8 % (ref 40–51)
HCV AB SERPL QL IA: NONREACTIVE
HGB BLD-MCNC: 15.3 GM/DL (ref 13.5–17.5)
HGB UR QL STRIP: NEGATIVE
HYALINE CASTS #/AREA URNS LPF: ABNORMAL /LPF
IMM GRANULOCYTES # BLD AUTO: 0.01 10*3/UL
IMM GRANULOCYTES NFR BLD AUTO: 0 %
KETONES UR QL STRIP: NEGATIVE
LEUKOCYTE ESTERASE UR QL STRIP: NEGATIVE
LYMPHOCYTES # BLD AUTO: 2.8 X10(3)/MCL (ref 0.6–4.6)
LYMPHOCYTES NFR BLD AUTO: 47 %
MCH RBC QN AUTO: 41.5 PG (ref 26–34)
MCHC RBC AUTO-ENTMCNC: 34.9 GM/DL (ref 31–37)
MCV RBC AUTO: 118.7 FL (ref 80–100)
MONOCYTES # BLD AUTO: 0.6 X10(3)/MCL (ref 0.1–1.3)
MONOCYTES NFR BLD AUTO: 10 %
NEG CONT SPOT COUNT: NORMAL
NEUTROPHILS # BLD AUTO: 2.32 X10(3)/MCL (ref 2.1–9.2)
NEUTROPHILS NFR BLD AUTO: 40 %
NITRITE UR QL STRIP: NEGATIVE
PANEL A SPOT COUNT: 0
PANEL B SPOT COUNT: 0
PH UR STRIP: 5.5 [PH] (ref 4.5–8)
PLATELET # BLD AUTO: 202 X10(3)/MCL (ref 130–400)
PMV BLD AUTO: 9.1 FL (ref 7.4–10.4)
POS CONT SPOT COUNT: NORMAL
POTASSIUM SERPL-SCNC: 4.4 MMOL/L (ref 3.5–5.1)
PROT SERPL-MCNC: 8 GM/DL (ref 5.8–7.6)
PROT UR QL STRIP: NEGATIVE
RBC # BLD AUTO: 3.69 X10(6)/MCL (ref 4.5–5.9)
RBC #/AREA URNS AUTO: ABNORMAL /HPF
SODIUM SERPL-SCNC: 138 MMOL/L (ref 136–145)
SP GR UR STRIP: 1.02 (ref 1–1.03)
SQUAMOUS #/AREA URNS LPF: ABNORMAL /LPF
T PALLIDUM AB SER QL: NONREACTIVE
T-SPOT.TB: NORMAL
TSH SERPL-ACNC: 1.18 UIU/ML (ref 0.35–4.94)
UROBILINOGEN UR STRIP-ACNC: NORMAL
WBC # SPEC AUTO: 5.8 X10(3)/MCL (ref 4.5–11)
WBC #/AREA URNS AUTO: ABNORMAL /HPF

## 2021-04-09 ENCOUNTER — HISTORICAL (OUTPATIENT)
Dept: LAB | Facility: HOSPITAL | Age: 61
End: 2021-04-09

## 2021-04-09 LAB
ALBUMIN SERPL-MCNC: 4.3 GM/DL (ref 3.4–4.8)
ALBUMIN/GLOB SERPL: 1.4 RATIO (ref 1.1–2)
ALP SERPL-CCNC: 71 UNIT/L (ref 40–150)
ALT SERPL-CCNC: 12 UNIT/L (ref 0–55)
AST SERPL-CCNC: 13 UNIT/L (ref 5–34)
BILIRUB SERPL-MCNC: 0.6 MG/DL
BILIRUBIN DIRECT+TOT PNL SERPL-MCNC: 0.2 MG/DL (ref 0–0.5)
BILIRUBIN DIRECT+TOT PNL SERPL-MCNC: 0.4 MG/DL (ref 0–0.8)
BUN SERPL-MCNC: 17.3 MG/DL (ref 8.4–25.7)
CALCIUM SERPL-MCNC: 9.5 MG/DL (ref 8.8–10)
CHLORIDE SERPL-SCNC: 103 MMOL/L (ref 98–107)
CHOLEST SERPL-MCNC: 167 MG/DL
CHOLEST/HDLC SERPL: 4 {RATIO} (ref 0–5)
CO2 SERPL-SCNC: 24 MMOL/L (ref 23–31)
CREAT SERPL-MCNC: 0.85 MG/DL (ref 0.73–1.18)
GLOBULIN SER-MCNC: 3.1 GM/DL (ref 2.4–3.5)
GLUCOSE SERPL-MCNC: 104 MG/DL (ref 82–115)
HDLC SERPL-MCNC: 42 MG/DL (ref 35–60)
LDLC SERPL CALC-MCNC: 82 MG/DL (ref 50–140)
POTASSIUM SERPL-SCNC: 4 MMOL/L (ref 3.5–5.1)
PROT SERPL-MCNC: 7.4 GM/DL (ref 5.8–7.6)
PSA SERPL-MCNC: 0.93 NG/ML
SODIUM SERPL-SCNC: 137 MMOL/L (ref 136–145)
TRIGL SERPL-MCNC: 213 MG/DL (ref 34–140)
VLDLC SERPL CALC-MCNC: 43 MG/DL

## 2021-05-17 ENCOUNTER — HISTORICAL (OUTPATIENT)
Dept: RADIOLOGY | Facility: HOSPITAL | Age: 61
End: 2021-05-17

## 2021-06-14 ENCOUNTER — HISTORICAL (OUTPATIENT)
Dept: ADMINISTRATIVE | Facility: HOSPITAL | Age: 61
End: 2021-06-14

## 2021-06-14 LAB
ABS NEUT (OLG): 1.5 X10(3)/MCL (ref 2.1–9.2)
ALBUMIN SERPL-MCNC: 4.1 GM/DL (ref 3.4–4.8)
ALBUMIN/GLOB SERPL: 1.3 RATIO (ref 1.1–2)
ALP SERPL-CCNC: 77 UNIT/L (ref 40–150)
ALT SERPL-CCNC: 14 UNIT/L (ref 0–55)
ANISOCYTOSIS BLD QL SMEAR: NORMAL
AST SERPL-CCNC: 11 UNIT/L (ref 5–34)
BASOPHILS # BLD AUTO: 0 X10(3)/MCL (ref 0–0.2)
BASOPHILS NFR BLD AUTO: 0 %
BILIRUB SERPL-MCNC: 0.7 MG/DL
BILIRUBIN DIRECT+TOT PNL SERPL-MCNC: 0.2 MG/DL (ref 0–0.5)
BILIRUBIN DIRECT+TOT PNL SERPL-MCNC: 0.5 MG/DL (ref 0–0.8)
BUN SERPL-MCNC: 12.5 MG/DL (ref 8.4–25.7)
CALCIUM SERPL-MCNC: 9.4 MG/DL (ref 8.8–10)
CD3+CD4+ CELLS # SPEC: 770 UNIT/L (ref 589–1505)
CD3+CD4+ CELLS NFR BLD: 30.9 % (ref 31–59)
CHLORIDE SERPL-SCNC: 103 MMOL/L (ref 98–107)
CO2 SERPL-SCNC: 26 MMOL/L (ref 23–31)
CREAT SERPL-MCNC: 0.85 MG/DL (ref 0.73–1.18)
EOSINOPHIL # BLD AUTO: 0.2 X10(3)/MCL (ref 0–0.9)
EOSINOPHIL NFR BLD AUTO: 4 %
ERYTHROCYTE [DISTWIDTH] IN BLOOD BY AUTOMATED COUNT: 11.8 % (ref 11.5–14.5)
GLOBULIN SER-MCNC: 3.1 GM/DL (ref 2.4–3.5)
GLUCOSE SERPL-MCNC: 104 MG/DL (ref 82–115)
HCT VFR BLD AUTO: 40.9 % (ref 40–51)
HGB BLD-MCNC: 14.6 GM/DL (ref 13.5–17.5)
IMM GRANULOCYTES # BLD AUTO: 0.01 10*3/UL
IMM GRANULOCYTES NFR BLD AUTO: 0 %
LYMPHOCYTES # BLD AUTO: 2.5 X10(3)/MCL (ref 0.6–4.6)
LYMPHOCYTES # BLD AUTO: 2491 UNIT/L (ref 1260–5520)
LYMPHOCYTES NFR BLD AUTO: 53 %
LYMPHOCYTES NFR LN MANUAL: 53 % (ref 28–48)
LYMPHOMA - T-CELL MARKERS SPEC-IMP: ABNORMAL
MACROCYTES BLD QL SMEAR: NORMAL
MCH RBC QN AUTO: 41.8 PG (ref 26–34)
MCHC RBC AUTO-ENTMCNC: 35.7 GM/DL (ref 31–37)
MCV RBC AUTO: 117.2 FL (ref 80–100)
MONOCYTES # BLD AUTO: 0.5 X10(3)/MCL (ref 0.1–1.3)
MONOCYTES NFR BLD AUTO: 10 %
NEUTROPHILS # BLD AUTO: 1.5 X10(3)/MCL (ref 2.1–9.2)
NEUTROPHILS NFR BLD AUTO: 32 %
NRBC BLD AUTO-RTO: 0 % (ref 0–0.2)
PLATELET # BLD AUTO: 201 X10(3)/MCL (ref 130–400)
PLATELET # BLD EST: NORMAL 10*3/UL
PMV BLD AUTO: 9 FL (ref 7.4–10.4)
POTASSIUM SERPL-SCNC: 4.2 MMOL/L (ref 3.5–5.1)
PROT SERPL-MCNC: 7.2 GM/DL (ref 5.8–7.6)
RBC # BLD AUTO: 3.49 X10(6)/MCL (ref 4.5–5.9)
RBC MORPH BLD: NORMAL
SODIUM SERPL-SCNC: 137 MMOL/L (ref 136–145)
WBC # BLD AUTO: 4700 /MM3 (ref 4500–11500)
WBC # SPEC AUTO: 4.7 X10(3)/MCL (ref 4.5–11)

## 2021-10-19 LAB — CRC RECOMMENDATION EXT: NORMAL

## 2021-10-21 ENCOUNTER — HISTORICAL (OUTPATIENT)
Dept: LAB | Facility: HOSPITAL | Age: 61
End: 2021-10-21

## 2021-10-21 LAB
ABS NEUT (OLG): 1.88 X10(3)/MCL (ref 2.1–9.2)
BASOPHILS # BLD AUTO: 0 X10(3)/MCL (ref 0–0.2)
BASOPHILS NFR BLD AUTO: 0 %
EOSINOPHIL # BLD AUTO: 0.2 X10(3)/MCL (ref 0–0.9)
EOSINOPHIL NFR BLD AUTO: 3 %
ERYTHROCYTE [DISTWIDTH] IN BLOOD BY AUTOMATED COUNT: 11.9 % (ref 11.5–14.5)
HCT VFR BLD AUTO: 39 % (ref 40–51)
HGB BLD-MCNC: 14.4 GM/DL (ref 13.5–17.5)
IMM GRANULOCYTES # BLD AUTO: 0.01 10*3/UL
IMM GRANULOCYTES NFR BLD AUTO: 0 %
LYMPHOCYTES # BLD AUTO: 2.6 X10(3)/MCL (ref 0.6–4.6)
LYMPHOCYTES NFR BLD AUTO: 50 %
MCH RBC QN AUTO: 43.1 PG (ref 26–34)
MCHC RBC AUTO-ENTMCNC: 36.9 GM/DL (ref 31–37)
MCV RBC AUTO: 116.8 FL (ref 80–100)
MONOCYTES # BLD AUTO: 0.6 X10(3)/MCL (ref 0.1–1.3)
MONOCYTES NFR BLD AUTO: 11 %
NEUTROPHILS # BLD AUTO: 1.88 X10(3)/MCL (ref 2.1–9.2)
NEUTROPHILS NFR BLD AUTO: 36 %
NRBC BLD AUTO-RTO: 0 % (ref 0–0.2)
PLATELET # BLD AUTO: 219 X10(3)/MCL (ref 130–400)
PMV BLD AUTO: 8.9 FL (ref 7.4–10.4)
RBC # BLD AUTO: 3.34 X10(6)/MCL (ref 4.5–5.9)
TESTOST SERPL-MCNC: 333.99 NG/DL (ref 220.91–715.81)
WBC # SPEC AUTO: 5.3 X10(3)/MCL (ref 4.5–11)

## 2021-12-20 ENCOUNTER — HISTORICAL (OUTPATIENT)
Dept: ADMINISTRATIVE | Facility: HOSPITAL | Age: 61
End: 2021-12-20

## 2021-12-20 LAB
ABS NEUT (OLG): 1.85 X10(3)/MCL (ref 2.1–9.2)
ALBUMIN SERPL-MCNC: 4.2 GM/DL (ref 3.4–4.8)
ALBUMIN/GLOB SERPL: 1.3 RATIO (ref 1.1–2)
ALP SERPL-CCNC: 66 UNIT/L (ref 40–150)
ALT SERPL-CCNC: 12 UNIT/L (ref 0–55)
APPEARANCE, UA: CLEAR
AST SERPL-CCNC: 12 UNIT/L (ref 5–34)
BACTERIA SPEC CULT: ABNORMAL
BASOPHILS # BLD AUTO: 0 X10(3)/MCL (ref 0–0.2)
BASOPHILS NFR BLD AUTO: 0 %
BILIRUB SERPL-MCNC: 0.4 MG/DL
BILIRUB UR QL STRIP: NEGATIVE
BILIRUBIN DIRECT+TOT PNL SERPL-MCNC: 0.2 MG/DL (ref 0–0.5)
BILIRUBIN DIRECT+TOT PNL SERPL-MCNC: 0.2 MG/DL (ref 0–0.8)
BUN SERPL-MCNC: 14.7 MG/DL (ref 8.4–25.7)
CALCIUM SERPL-MCNC: 9.7 MG/DL (ref 8.7–10.5)
CHLORIDE SERPL-SCNC: 105 MMOL/L (ref 98–107)
CHOLEST SERPL-MCNC: 195 MG/DL
CHOLEST/HDLC SERPL: 5 {RATIO} (ref 0–5)
CO2 SERPL-SCNC: 25 MMOL/L (ref 23–31)
COLOR UR: ABNORMAL
CREAT SERPL-MCNC: 0.81 MG/DL (ref 0.73–1.18)
DEPRECATED CALCIDIOL+CALCIFEROL SERPL-MC: 37.7 NG/ML (ref 30–80)
EOSINOPHIL # BLD AUTO: 0.2 X10(3)/MCL (ref 0–0.9)
EOSINOPHIL NFR BLD AUTO: 4 %
ERYTHROCYTE [DISTWIDTH] IN BLOOD BY AUTOMATED COUNT: 11.7 % (ref 11.5–14.5)
EST. AVERAGE GLUCOSE BLD GHB EST-MCNC: 102.5 MG/DL
GLOBULIN SER-MCNC: 3.2 GM/DL (ref 2.4–3.5)
GLUCOSE (UA): NORMAL /UL
GLUCOSE SERPL-MCNC: 100 MG/DL (ref 82–115)
HBA1C MFR BLD: 5.2 %
HCT VFR BLD AUTO: 42 % (ref 40–51)
HCV AB SERPL QL IA: NONREACTIVE
HDLC SERPL-MCNC: 42 MG/DL (ref 35–60)
HGB BLD-MCNC: 14.9 GM/DL (ref 13.5–17.5)
HGB UR QL STRIP: ABNORMAL /HPF
HYALINE CASTS #/AREA URNS LPF: ABNORMAL /LPF
IMM GRANULOCYTES # BLD AUTO: 0.01 10*3/UL
IMM GRANULOCYTES NFR BLD AUTO: 0 %
KETONES UR QL STRIP: NEGATIVE /UL
LDLC SERPL CALC-MCNC: 114 MG/DL (ref 50–140)
LEUKOCYTE ESTERASE UR QL STRIP: NEGATIVE
LYMPHOCYTES # BLD AUTO: 2.4 X10(3)/MCL (ref 0.6–4.6)
LYMPHOCYTES NFR BLD AUTO: 48 %
MCH RBC QN AUTO: 42.3 PG (ref 26–34)
MCHC RBC AUTO-ENTMCNC: 35.5 GM/DL (ref 31–37)
MCV RBC AUTO: 119.3 FL (ref 80–100)
MONOCYTES # BLD AUTO: 0.5 X10(3)/MCL (ref 0.1–1.3)
MONOCYTES NFR BLD AUTO: 11 %
MUCOUS THREADS URNS QL MICRO: ABNORMAL /LPF
NEG CONT SPOT COUNT: NORMAL
NEUTROPHILS # BLD AUTO: 1.85 X10(3)/MCL (ref 2.1–9.2)
NEUTROPHILS NFR BLD AUTO: 37 %
NITRITE UR QL STRIP: NEGATIVE
NRBC BLD AUTO-RTO: 0 % (ref 0–0.2)
PANEL A SPOT COUNT: 0
PANEL B SPOT COUNT: 0
PH UR STRIP: 5.5 /UL (ref 4.5–8)
PLATELET # BLD AUTO: 227 X10(3)/MCL (ref 130–400)
PMV BLD AUTO: 8.9 FL (ref 7.4–10.4)
POS CONT SPOT COUNT: NORMAL
POTASSIUM SERPL-SCNC: 4.2 MMOL/L (ref 3.5–5.1)
PROT SERPL-MCNC: 7.4 GM/DL (ref 5.8–7.6)
PROT UR QL STRIP: NEGATIVE /UL
RBC # BLD AUTO: 3.52 X10(6)/MCL (ref 4.5–5.9)
RBC #/AREA URNS HPF: ABNORMAL /HPF
SODIUM SERPL-SCNC: 137 MMOL/L (ref 136–145)
SP GR UR STRIP: 1.02 (ref 1–1.03)
SQUAMOUS EPITHELIAL, UA: ABNORMAL /LPF
T PALLIDUM AB SER QL: NONREACTIVE
T-SPOT.TB: NORMAL
TRIGL SERPL-MCNC: 193 MG/DL (ref 34–140)
TSH SERPL-ACNC: 1.03 UIU/ML (ref 0.35–4.94)
UROBILINOGEN UR STRIP-ACNC: NORMAL /HPF
VLDLC SERPL CALC-MCNC: 39 MG/DL
WBC # SPEC AUTO: 5 X10(3)/MCL (ref 4.5–11)
WBC #/AREA URNS HPF: ABNORMAL /HPF

## 2022-04-10 ENCOUNTER — HISTORICAL (OUTPATIENT)
Dept: ADMINISTRATIVE | Facility: HOSPITAL | Age: 62
End: 2022-04-10
Payer: COMMERCIAL

## 2022-04-18 ENCOUNTER — HISTORICAL (OUTPATIENT)
Dept: LAB | Facility: HOSPITAL | Age: 62
End: 2022-04-18
Payer: COMMERCIAL

## 2022-04-18 LAB
ABS NEUT (OLG): 2.03 (ref 2.1–9.2)
ALBUMIN SERPL-MCNC: 4.1 G/DL (ref 3.4–4.8)
ALBUMIN/GLOB SERPL: 1.2 {RATIO} (ref 1.1–2)
ALP SERPL-CCNC: 79 U/L (ref 40–150)
ALT SERPL-CCNC: 23 U/L (ref 0–55)
AST SERPL-CCNC: 15 U/L (ref 5–34)
BASOPHILS # BLD AUTO: 0 10*3/UL (ref 0–0.2)
BASOPHILS NFR BLD AUTO: 0 %
BILIRUB SERPL-MCNC: 0.4 MG/DL
BILIRUBIN DIRECT+TOT PNL SERPL-MCNC: 0.2 (ref 0–0.5)
BILIRUBIN DIRECT+TOT PNL SERPL-MCNC: 0.2 (ref 0–0.8)
BUN SERPL-MCNC: 15.6 MG/DL (ref 8.4–25.7)
CALCIUM SERPL-MCNC: 9.9 MG/DL (ref 8.7–10.5)
CHLORIDE SERPL-SCNC: 104 MMOL/L (ref 98–107)
CHOLEST SERPL-MCNC: 174 MG/DL
CHOLEST/HDLC SERPL: 4 {RATIO} (ref 0–5)
CO2 SERPL-SCNC: 25 MMOL/L (ref 23–31)
CREAT SERPL-MCNC: 0.85 MG/DL (ref 0.73–1.18)
EOSINOPHIL # BLD AUTO: 0.2 10*3/UL (ref 0–0.9)
EOSINOPHIL NFR BLD AUTO: 3 %
ERYTHROCYTE [DISTWIDTH] IN BLOOD BY AUTOMATED COUNT: 11.6 % (ref 11.5–14.5)
FLAG2 (OHS): 80
FLAG3 (OHS): 90
FLAGS (OHS): 60
GLOBULIN SER-MCNC: 3.5 G/DL (ref 2.4–3.5)
GLUCOSE SERPL-MCNC: 102 MG/DL (ref 82–115)
HCT VFR BLD AUTO: 38.5 % (ref 40–51)
HDLC SERPL-MCNC: 39 MG/DL (ref 35–60)
HEMOLYSIS INTERF INDEX SERPL-ACNC: 3
HGB BLD-MCNC: 13.6 G/DL (ref 13.5–17.5)
ICTERIC INTERF INDEX SERPL-ACNC: 0
IMM GRANULOCYTES # BLD AUTO: 0.01 10*3/UL
IMM GRANULOCYTES NFR BLD AUTO: 0 %
IMM. NE 2 SUSPECT FLAG (OHS): 30
LDLC SERPL CALC-MCNC: 107 MG/DL (ref 50–140)
LIPEMIC INTERF INDEX SERPL-ACNC: 12
LOW EVENT # SUSPECT FLAG (OHS): 60
LYMPHOCYTES # BLD AUTO: 2.1 10*3/UL (ref 0.6–4.6)
LYMPHOCYTES NFR BLD AUTO: 44 %
MANUAL DIFF? (OHS): NO
MCH RBC QN AUTO: 41.6 PG (ref 26–34)
MCHC RBC AUTO-ENTMCNC: 35.3 G/DL (ref 31–37)
MCV RBC AUTO: 117.7 FL (ref 80–100)
MO BLASTS SUSPECT FLAG (OHS): 70
MONOCYTES # BLD AUTO: 0.5 10*3/UL (ref 0.1–1.3)
MONOCYTES NFR BLD AUTO: 10 %
NEUTROPHILS # BLD AUTO: 2.03 10*3/UL (ref 2.1–9.2)
NEUTROPHILS NFR BLD AUTO: 42 %
NRBC BLD AUTO-RTO: 0 % (ref 0–0.2)
PLATELET # BLD AUTO: 223 10*3/UL (ref 130–400)
PMV BLD AUTO: 8.9 FL (ref 7.4–10.4)
POTASSIUM SERPL-SCNC: 4.4 MMOL/L (ref 3.5–5.1)
PROT SERPL-MCNC: 7.6 G/DL (ref 5.8–7.6)
PSA SERPL-MCNC: 1.08 NG/ML
RBC # BLD AUTO: 3.27 10*6/UL (ref 4.5–5.9)
SODIUM SERPL-SCNC: 138 MMOL/L (ref 136–145)
TRIGL SERPL-MCNC: 139 MG/DL (ref 34–140)
VLDLC SERPL CALC-MCNC: 28 MG/DL
WBC # SPEC AUTO: 4.8 10*3/UL (ref 4.5–11)

## 2022-04-27 VITALS
HEIGHT: 69 IN | BODY MASS INDEX: 20.7 KG/M2 | WEIGHT: 139.75 LBS | DIASTOLIC BLOOD PRESSURE: 80 MMHG | OXYGEN SATURATION: 99 % | SYSTOLIC BLOOD PRESSURE: 169 MMHG

## 2022-05-02 ENCOUNTER — TELEPHONE (OUTPATIENT)
Dept: INTERNAL MEDICINE | Facility: CLINIC | Age: 62
End: 2022-05-02
Payer: COMMERCIAL

## 2022-05-02 RX ORDER — DESVENLAFAXINE 100 MG/1
1 TABLET, EXTENDED RELEASE ORAL DAILY
COMMUNITY
Start: 2022-01-31 | End: 2024-01-19 | Stop reason: SDUPTHER

## 2022-05-04 RX ORDER — CARISOPRODOL 350 MG/1
350 TABLET ORAL 2 TIMES DAILY
COMMUNITY
Start: 2022-01-31 | End: 2022-05-04 | Stop reason: SDUPTHER

## 2022-05-04 RX ORDER — CARISOPRODOL 350 MG/1
350 TABLET ORAL 2 TIMES DAILY PRN
Qty: 60 TABLET | Refills: 0 | Status: SHIPPED | OUTPATIENT
Start: 2022-05-04 | End: 2022-06-06 | Stop reason: SDUPTHER

## 2022-06-06 RX ORDER — CARISOPRODOL 350 MG/1
350 TABLET ORAL 2 TIMES DAILY PRN
Qty: 60 TABLET | Refills: 0 | Status: SHIPPED | OUTPATIENT
Start: 2022-06-06 | End: 2022-07-06 | Stop reason: SDUPTHER

## 2022-06-22 ENCOUNTER — LAB VISIT (OUTPATIENT)
Dept: LAB | Facility: HOSPITAL | Age: 62
End: 2022-06-22
Attending: NURSE PRACTITIONER
Payer: COMMERCIAL

## 2022-06-22 DIAGNOSIS — B20 HIV INFECTION, UNSPECIFIED SYMPTOM STATUS: Primary | ICD-10-CM

## 2022-06-22 LAB
ALBUMIN SERPL-MCNC: 4.4 GM/DL (ref 3.4–4.8)
ALBUMIN/GLOB SERPL: 1.4 RATIO (ref 1.1–2)
ALP SERPL-CCNC: 70 UNIT/L (ref 40–150)
ALT SERPL-CCNC: 14 UNIT/L (ref 0–55)
AST SERPL-CCNC: 13 UNIT/L (ref 5–34)
BASOPHILS # BLD AUTO: 0.01 X10(3)/MCL (ref 0–0.2)
BASOPHILS NFR BLD AUTO: 0.2 %
BILIRUBIN DIRECT+TOT PNL SERPL-MCNC: 0.6 MG/DL
BUN SERPL-MCNC: 18.3 MG/DL (ref 8.4–25.7)
CALCIUM SERPL-MCNC: 10 MG/DL (ref 8.8–10)
CHLORIDE SERPL-SCNC: 104 MMOL/L (ref 98–107)
CO2 SERPL-SCNC: 27 MMOL/L (ref 23–31)
CREAT SERPL-MCNC: 0.86 MG/DL (ref 0.73–1.18)
EOSINOPHIL # BLD AUTO: 0.23 X10(3)/MCL (ref 0–0.9)
EOSINOPHIL NFR BLD AUTO: 4.2 %
ERYTHROCYTE [DISTWIDTH] IN BLOOD BY AUTOMATED COUNT: 11.2 % (ref 11.5–17)
GLOBULIN SER-MCNC: 3.2 GM/DL (ref 2.4–3.5)
GLUCOSE SERPL-MCNC: 104 MG/DL (ref 82–115)
HCT VFR BLD AUTO: 42.7 % (ref 42–52)
HGB BLD-MCNC: 14.8 GM/DL (ref 14–18)
IMM GRANULOCYTES # BLD AUTO: 0.01 X10(3)/MCL (ref 0–0.02)
IMM GRANULOCYTES NFR BLD AUTO: 0.2 % (ref 0–0.43)
LYMPHOCYTES # BLD AUTO: 2.55 X10(3)/MCL (ref 0.6–4.6)
LYMPHOCYTES NFR BLD AUTO: 46 %
MCH RBC QN AUTO: 40.5 PG (ref 27–31)
MCHC RBC AUTO-ENTMCNC: 34.7 MG/DL (ref 33–36)
MCV RBC AUTO: 117 FL (ref 80–94)
MONOCYTES # BLD AUTO: 0.58 X10(3)/MCL (ref 0.1–1.3)
MONOCYTES NFR BLD AUTO: 10.5 %
NEUTROPHILS # BLD AUTO: 2.2 X10(3)/MCL (ref 2.1–9.2)
NEUTROPHILS NFR BLD AUTO: 38.9 %
NRBC BLD AUTO-RTO: 0 %
PLATELET # BLD AUTO: 225 X10(3)/MCL (ref 130–400)
PMV BLD AUTO: 9.1 FL (ref 9.4–12.4)
POTASSIUM SERPL-SCNC: 4.2 MMOL/L (ref 3.5–5.1)
PROT SERPL-MCNC: 7.6 GM/DL (ref 5.8–7.6)
RBC # BLD AUTO: 3.65 X10(6)/MCL (ref 4.7–6.1)
SODIUM SERPL-SCNC: 140 MMOL/L (ref 136–145)
WBC # SPEC AUTO: 5.5 X10(3)/MCL (ref 4.5–11.5)

## 2022-06-22 PROCEDURE — 36415 COLL VENOUS BLD VENIPUNCTURE: CPT

## 2022-06-22 PROCEDURE — 85025 COMPLETE CBC W/AUTO DIFF WBC: CPT

## 2022-06-22 PROCEDURE — 86361 T CELL ABSOLUTE COUNT: CPT

## 2022-06-22 PROCEDURE — 80053 COMPREHEN METABOLIC PANEL: CPT

## 2022-06-23 LAB
AGE: 62
CD3+CD4+ CELLS # BLD: 46 % (ref 28–48)
CD3+CD4+ CELLS # SPEC: 875.38 UNIT/L (ref 589–1505)
CD3+CD4+ CELLS NFR BLD: 34.6 %
LYMPHOCYTES # BLD AUTO: 2530 X10(3)/MCL (ref 1260–5520)
LYMPHOMA - T-CELL MARKERS SPEC-IMP: NORMAL
WBC # BLD AUTO: 5500 /MM3 (ref 4500–11500)

## 2022-06-27 ENCOUNTER — OFFICE VISIT (OUTPATIENT)
Dept: INFECTIOUS DISEASES | Facility: CLINIC | Age: 62
End: 2022-06-27
Payer: COMMERCIAL

## 2022-06-27 VITALS
HEART RATE: 53 BPM | TEMPERATURE: 98 F | SYSTOLIC BLOOD PRESSURE: 134 MMHG | WEIGHT: 141 LBS | HEIGHT: 69 IN | RESPIRATION RATE: 18 BRPM | BODY MASS INDEX: 20.88 KG/M2 | DIASTOLIC BLOOD PRESSURE: 67 MMHG

## 2022-06-27 DIAGNOSIS — B20 HIV DISEASE: ICD-10-CM

## 2022-06-27 DIAGNOSIS — B00.9 HSV (HERPES SIMPLEX VIRUS) INFECTION: ICD-10-CM

## 2022-06-27 DIAGNOSIS — B20 HIV DISEASE: Primary | ICD-10-CM

## 2022-06-27 PROCEDURE — 99214 PR OFFICE/OUTPT VISIT, EST, LEVL IV, 30-39 MIN: ICD-10-PCS | Mod: S$PBB,,, | Performed by: NURSE PRACTITIONER

## 2022-06-27 PROCEDURE — 99215 OFFICE O/P EST HI 40 MIN: CPT | Mod: PBBFAC | Performed by: NURSE PRACTITIONER

## 2022-06-27 PROCEDURE — 87536 HIV-1 QUANT&REVRSE TRNSCRPJ: CPT | Performed by: NURSE PRACTITIONER

## 2022-06-27 PROCEDURE — 99214 OFFICE O/P EST MOD 30 MIN: CPT | Mod: S$PBB,,, | Performed by: NURSE PRACTITIONER

## 2022-06-27 PROCEDURE — 36415 COLL VENOUS BLD VENIPUNCTURE: CPT

## 2022-06-27 RX ORDER — NELFINAVIR MESYLATE 625 MG/1
1250 TABLET, FILM COATED ORAL 2 TIMES DAILY
Qty: 360 TABLET | Refills: 1 | Status: SHIPPED | OUTPATIENT
Start: 2022-06-27 | End: 2022-12-27 | Stop reason: SDUPTHER

## 2022-06-27 RX ORDER — LOSARTAN POTASSIUM 100 MG/1
100 TABLET ORAL DAILY
COMMUNITY
Start: 2022-06-24 | End: 2023-02-06

## 2022-06-27 RX ORDER — ASPIRIN 81 MG/1
81 TABLET ORAL
COMMUNITY

## 2022-06-27 RX ORDER — ROSUVASTATIN CALCIUM 5 MG/1
5 TABLET, COATED ORAL DAILY
COMMUNITY
Start: 2022-06-24 | End: 2023-04-12

## 2022-06-27 RX ORDER — LAMIVUDINE AND ZIDOVUDINE 150; 300 MG/1; MG/1
1 TABLET, FILM COATED ORAL 2 TIMES DAILY
COMMUNITY
Start: 2022-06-24 | End: 2022-06-27 | Stop reason: SDUPTHER

## 2022-06-27 RX ORDER — ESOMEPRAZOLE MAGNESIUM 40 MG/1
40 CAPSULE, DELAYED RELEASE ORAL DAILY
COMMUNITY
Start: 2022-06-24 | End: 2023-04-12

## 2022-06-27 RX ORDER — LAMIVUDINE AND ZIDOVUDINE 150; 300 MG/1; MG/1
1 TABLET, FILM COATED ORAL 2 TIMES DAILY
Qty: 180 TABLET | Refills: 1 | Status: SHIPPED | OUTPATIENT
Start: 2022-06-27 | End: 2022-12-27 | Stop reason: SDUPTHER

## 2022-06-27 RX ORDER — ALPRAZOLAM 1 MG/1
1 TABLET ORAL 2 TIMES DAILY PRN
COMMUNITY
Start: 2022-05-31 | End: 2023-01-17

## 2022-06-27 RX ORDER — CLOPIDOGREL BISULFATE 75 MG/1
75 TABLET ORAL DAILY
COMMUNITY
Start: 2022-06-24 | End: 2023-05-04 | Stop reason: SDUPTHER

## 2022-06-27 RX ORDER — NELFINAVIR MESYLATE 625 MG/1
1250 TABLET, FILM COATED ORAL 2 TIMES DAILY
COMMUNITY
Start: 2022-06-24 | End: 2022-06-27 | Stop reason: SDUPTHER

## 2022-06-27 RX ORDER — ACYCLOVIR 400 MG/1
400 TABLET ORAL 2 TIMES DAILY
COMMUNITY
Start: 2022-06-24 | End: 2022-06-27 | Stop reason: SDUPTHER

## 2022-06-27 RX ORDER — ACYCLOVIR 400 MG/1
400 TABLET ORAL 2 TIMES DAILY
Qty: 180 TABLET | Refills: 1 | Status: SHIPPED | OUTPATIENT
Start: 2022-06-27 | End: 2022-12-27 | Stop reason: SDUPTHER

## 2022-06-27 RX ORDER — FAMOTIDINE 40 MG/1
40 TABLET, FILM COATED ORAL DAILY
COMMUNITY
Start: 2022-06-24 | End: 2023-04-12

## 2022-06-27 RX ORDER — FLUTICASONE PROPIONATE 50 MCG
1 SPRAY, SUSPENSION (ML) NASAL 2 TIMES DAILY
COMMUNITY
Start: 2022-06-24 | End: 2022-11-03

## 2022-06-27 NOTE — PROGRESS NOTES
Subjective:       Patient ID: Reyes Ren is a 61 y.o. male.    Chief Complaint: b20 f/u     6/27/22  Erik is a 62 yo WM presenting today for HIV f/u visit.  He reports 100% adherent to Combivir & Viracept.  He is virally suppressed & labs are stable.  Labs collected 6/22/22 VL not collected, CD4 875.  Will draw today for VL.  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  He is still not interested in ART revision, but will explore fillers for cosmetic purposes.  He is taking acyclovir daily for HSV suppression, no outbreaks. Remains in care with PCP Dr. Perez.  All questions answered & concerns addressed.     12/27/21  Erik is a 61 yo WM presenting today for HIV f/u visit.  He reports 100% adherent to Combivir & Viracept.  He tolerates this regimen well & is resistant to ART revision despite recommendations to do so.  Labs collected 12/20/21 VL <20, CD4 not collected.  Renal function & electrolytes stable, CBC WNL.  He remains in care with PCP Dr. Perez for management of comorbidities.  He has a longstanding diagnosis of anxiety which has been managed with Xanax bid for the past 30 years or so.  He tells me that his oldest brother passed away 11/2021 due to self-inflicted gunshot.  He is continuing to struggle with same. Encouraged him to set up evaluation with a  provider to assist with same.  Voiced understanding.  He tells me that he occasionally smokes cigarettes, but no longer on a regular basis.  All questions answered & concerns addressed.     6/21/21  Reyes is a 61 yo WM presenting today for HIV f/u visit.  He reports 100% adherent to Combivir & Viracept.  Tolerates well with viral suppression.  Labs 6/14/21 VL <20, CD4 770.  He remains in care with PCP Dr. Perez.  HSV suppressed with acyclovir daily.  Uses [facility] pharmacy.  He quit smoking a couple of months ago, but tells me that he did smoke 1/2 pk of cigarettes this weekend while drinking  alcohol.  He gave away the rest & will quit again.  He has not yet received the COVID vaccination, plans to get it but has anxiety. Feels better about getting the vaccine now that he does not have to make an appointment & wait with anxiety.  Support provided with gentle encouragement.  All questions answered & concerns addressed.    Review of Systems   Constitutional: Negative.    HENT: Negative.    Respiratory: Negative.    Cardiovascular: Negative.    Gastrointestinal: Negative.    Genitourinary: Negative.    Integumentary:  Negative.   Neurological: Negative.    Hematological: Negative.    Psychiatric/Behavioral: Negative.          Objective:      Physical Exam  Vitals reviewed.   Constitutional:       General: He is not in acute distress.     Appearance: Normal appearance. He is not toxic-appearing.   HENT:      Mouth/Throat:      Mouth: Mucous membranes are moist.      Pharynx: Oropharynx is clear.   Eyes:      Conjunctiva/sclera: Conjunctivae normal.   Cardiovascular:      Rate and Rhythm: Normal rate and regular rhythm.   Pulmonary:      Effort: Pulmonary effort is normal. No respiratory distress.      Breath sounds: Normal breath sounds.   Abdominal:      General: Abdomen is flat. Bowel sounds are normal.      Palpations: Abdomen is soft.   Musculoskeletal:         General: Normal range of motion.      Cervical back: Normal range of motion.   Lymphadenopathy:      Cervical: No cervical adenopathy.   Skin:     General: Skin is warm and dry.   Neurological:      General: No focal deficit present.      Mental Status: He is alert and oriented to person, place, and time. Mental status is at baseline.   Psychiatric:         Mood and Affect: Mood normal.         Behavior: Behavior normal.         Assessment:       Problem List Items Addressed This Visit        ID    HIV disease - Primary    Relevant Medications    VIRACEPT 625 mg tablet    lamivudine-zidovudine 150-300mg (COMBIVIR) 150-300 mg Tab    Other Relevant  Orders    HIV-1 RNA, Quantitative, PCR with Reflex to Genotype    HIV-1 RNA, Quantitative, PCR with Reflex to Genotype    CD4 T-[place] Cells    Urinalysis    Vitamin D    Hepatitis C Antibody    SYPHILIS ANTIBODY (WITH REFLEX RPR)    Comprehensive Metabolic Panel    CBC Auto Differential    Quantiferon Gold TB    HSV (herpes simplex virus) infection    Relevant Medications    acyclovir (ZOVIRAX) 400 MG tablet          Plan:     HIV disease  -     VIRACEPT 625 mg tablet; Take 2 tablets (1,250 mg total) by mouth 2 (two) times daily.  Dispense: 360 tablet; Refill: 1  -     lamivudine-zidovudine 150-300mg (COMBIVIR) 150-300 mg Tab; Take 1 tablet by mouth 2 (two) times daily.  Dispense: 180 tablet; Refill: 1  -     HIV-1 RNA, Quantitative, PCR with Reflex to Genotype; Future; Expected date: 06/27/2022  -     HIV-1 RNA, Quantitative, PCR with Reflex to Genotype; Future; Expected date: 12/27/2022  -     CD4 T-[place] Cells; Future; Expected date: 12/27/2022  -     Urinalysis; Future; Expected date: 12/27/2022  -     Vitamin D; Future; Expected date: 12/27/2022  -     Hepatitis C Antibody; Future; Expected date: 12/27/2022  -     SYPHILIS ANTIBODY (WITH REFLEX RPR); Future; Expected date: 06/27/2022  -     Comprehensive Metabolic Panel; Future; Expected date: 12/27/2022  -     CBC Auto Differential; Future; Expected date: 12/27/2022  -     Quantiferon Gold TB; Future; Expected date: 09/27/2022    adherence/sexual health counseling done  continue Combivir/Viracept  labs 1 wk prior to next visit  RTC 6 months with Corrine villanueva f/u with PCP      HSV (herpes simplex virus) infection  -     acyclovir (ZOVIRAX) 400 MG tablet; Take 1 tablet (400 mg total) by mouth 2 (two) times daily.  Dispense: 180 tablet; Refill: 1    suppressed  continue acyclovir for suppression

## 2022-06-28 LAB — HIV1 RNA # PLAS NAA DL=20: <20 COPIES/ML

## 2022-07-06 DIAGNOSIS — M62.838 MUSCLE SPASM: Primary | ICD-10-CM

## 2022-07-06 RX ORDER — CARISOPRODOL 350 MG/1
350 TABLET ORAL 2 TIMES DAILY PRN
Qty: 60 TABLET | Refills: 0 | Status: SHIPPED | OUTPATIENT
Start: 2022-07-06 | End: 2022-08-08 | Stop reason: SDUPTHER

## 2022-08-08 DIAGNOSIS — M62.838 MUSCLE SPASM: ICD-10-CM

## 2022-08-10 ENCOUNTER — TELEPHONE (OUTPATIENT)
Dept: INTERNAL MEDICINE | Facility: CLINIC | Age: 62
End: 2022-08-10
Payer: COMMERCIAL

## 2022-08-10 RX ORDER — CARISOPRODOL 350 MG/1
350 TABLET ORAL 2 TIMES DAILY PRN
Qty: 60 TABLET | Refills: 0 | Status: SHIPPED | OUTPATIENT
Start: 2022-08-10 | End: 2022-09-06 | Stop reason: SDUPTHER

## 2022-09-06 DIAGNOSIS — M62.838 MUSCLE SPASM: ICD-10-CM

## 2022-09-08 RX ORDER — CARISOPRODOL 350 MG/1
350 TABLET ORAL 2 TIMES DAILY PRN
Qty: 60 TABLET | Refills: 0 | Status: SHIPPED | OUTPATIENT
Start: 2022-09-08 | End: 2022-10-06 | Stop reason: SDUPTHER

## 2022-10-06 DIAGNOSIS — M62.838 MUSCLE SPASM: ICD-10-CM

## 2022-10-10 RX ORDER — CARISOPRODOL 350 MG/1
350 TABLET ORAL 2 TIMES DAILY PRN
Qty: 60 TABLET | Refills: 0 | Status: SHIPPED | OUTPATIENT
Start: 2022-10-10 | End: 2022-11-10 | Stop reason: SDUPTHER

## 2022-10-14 ENCOUNTER — TELEPHONE (OUTPATIENT)
Dept: INTERNAL MEDICINE | Facility: CLINIC | Age: 62
End: 2022-10-14
Payer: COMMERCIAL

## 2022-10-14 NOTE — TELEPHONE ENCOUNTER
----- Message from Kathy Sosa Patient Care Assistant sent at 10/14/2022  9:11 AM CDT -----  Regarding: RE: NAYELI Perez 10/21/22 @0900  Pt. Confirmed   ----- Message -----  From: Maria L Ventura LPN  Sent: 10/14/2022   8:29 AM CDT  To: Kathy Sosa Patient Care Assistant  Subject: NAYELI Perez 10/21/22 @0900                       Are there any outstanding tasks in the chart? no    Is there any documentation of tasks? no    Has patient seen another physician, been to the ER, [facility], or admitted to hospital since last visit?    Has the patient done blood work or imaging since last visit?

## 2022-10-18 ENCOUNTER — DOCUMENTATION ONLY (OUTPATIENT)
Dept: INTERNAL MEDICINE | Facility: CLINIC | Age: 62
End: 2022-10-18
Payer: COMMERCIAL

## 2022-10-21 ENCOUNTER — OFFICE VISIT (OUTPATIENT)
Dept: INTERNAL MEDICINE | Facility: CLINIC | Age: 62
End: 2022-10-21
Payer: COMMERCIAL

## 2022-10-21 VITALS
DIASTOLIC BLOOD PRESSURE: 68 MMHG | HEIGHT: 69 IN | WEIGHT: 137 LBS | HEART RATE: 47 BPM | SYSTOLIC BLOOD PRESSURE: 138 MMHG | OXYGEN SATURATION: 99 % | BODY MASS INDEX: 20.29 KG/M2

## 2022-10-21 DIAGNOSIS — I10 PRIMARY HYPERTENSION: ICD-10-CM

## 2022-10-21 DIAGNOSIS — E78.5 HYPERLIPIDEMIA, UNSPECIFIED HYPERLIPIDEMIA TYPE: ICD-10-CM

## 2022-10-21 DIAGNOSIS — G89.29 OTHER CHRONIC PAIN: ICD-10-CM

## 2022-10-21 PROBLEM — F41.8 MIXED ANXIETY DEPRESSIVE DISORDER: Status: ACTIVE | Noted: 2022-10-21

## 2022-10-21 PROBLEM — F41.0 PANIC ATTACK: Status: ACTIVE | Noted: 2022-10-21

## 2022-10-21 PROBLEM — M51.369 DEGENERATION OF INTERVERTEBRAL DISC OF LUMBAR REGION: Status: ACTIVE | Noted: 2022-10-21

## 2022-10-21 PROBLEM — I77.9 PERIPHERAL ARTERIAL OCCLUSIVE DISEASE: Status: ACTIVE | Noted: 2022-10-21

## 2022-10-21 PROBLEM — M54.30 SCIATICA: Status: ACTIVE | Noted: 2022-10-21

## 2022-10-21 PROBLEM — I70.213 ATHEROSCLEROSIS OF NATIVE ARTERY OF BOTH LOWER EXTREMITIES WITH INTERMITTENT CLAUDICATION: Status: ACTIVE | Noted: 2020-07-07

## 2022-10-21 PROBLEM — G62.9 NEUROPATHY: Status: ACTIVE | Noted: 2022-10-21

## 2022-10-21 PROBLEM — K21.9 GASTROESOPHAGEAL REFLUX DISEASE: Status: ACTIVE | Noted: 2022-10-21

## 2022-10-21 PROBLEM — M51.36 DEGENERATION OF INTERVERTEBRAL DISC OF LUMBAR REGION: Status: ACTIVE | Noted: 2022-10-21

## 2022-10-21 PROBLEM — E29.1 HYPOGONADISM IN MALE: Status: ACTIVE | Noted: 2022-10-21

## 2022-10-21 PROBLEM — Z21 HIV INFECTION: Status: ACTIVE | Noted: 2022-06-27

## 2022-10-21 PROCEDURE — 3078F PR MOST RECENT DIASTOLIC BLOOD PRESSURE < 80 MM HG: ICD-10-PCS | Mod: CPTII,,, | Performed by: INTERNAL MEDICINE

## 2022-10-21 PROCEDURE — 99214 OFFICE O/P EST MOD 30 MIN: CPT | Mod: ,,, | Performed by: INTERNAL MEDICINE

## 2022-10-21 PROCEDURE — 1160F PR REVIEW ALL MEDS BY PRESCRIBER/CLIN PHARMACIST DOCUMENTED: ICD-10-PCS | Mod: CPTII,,, | Performed by: INTERNAL MEDICINE

## 2022-10-21 PROCEDURE — 1159F PR MEDICATION LIST DOCUMENTED IN MEDICAL RECORD: ICD-10-PCS | Mod: CPTII,,, | Performed by: INTERNAL MEDICINE

## 2022-10-21 PROCEDURE — 3078F DIAST BP <80 MM HG: CPT | Mod: CPTII,,, | Performed by: INTERNAL MEDICINE

## 2022-10-21 PROCEDURE — 4010F PR ACE/ARB THEARPY RXD/TAKEN: ICD-10-PCS | Mod: CPTII,,, | Performed by: INTERNAL MEDICINE

## 2022-10-21 PROCEDURE — 1159F MED LIST DOCD IN RCRD: CPT | Mod: CPTII,,, | Performed by: INTERNAL MEDICINE

## 2022-10-21 PROCEDURE — 3075F PR MOST RECENT SYSTOLIC BLOOD PRESS GE 130-139MM HG: ICD-10-PCS | Mod: CPTII,,, | Performed by: INTERNAL MEDICINE

## 2022-10-21 PROCEDURE — 1160F RVW MEDS BY RX/DR IN RCRD: CPT | Mod: CPTII,,, | Performed by: INTERNAL MEDICINE

## 2022-10-21 PROCEDURE — 4010F ACE/ARB THERAPY RXD/TAKEN: CPT | Mod: CPTII,,, | Performed by: INTERNAL MEDICINE

## 2022-10-21 PROCEDURE — 3075F SYST BP GE 130 - 139MM HG: CPT | Mod: CPTII,,, | Performed by: INTERNAL MEDICINE

## 2022-10-21 PROCEDURE — 99214 PR OFFICE/OUTPT VISIT, EST, LEVL IV, 30-39 MIN: ICD-10-PCS | Mod: ,,, | Performed by: INTERNAL MEDICINE

## 2022-10-21 RX ORDER — AMLODIPINE BESYLATE 2.5 MG/1
2.5 TABLET ORAL DAILY
Qty: 30 TABLET | Refills: 11 | Status: SHIPPED | OUTPATIENT
Start: 2022-10-21 | End: 2023-08-30

## 2022-10-21 RX ORDER — ALBUTEROL SULFATE 90 UG/1
2 AEROSOL, METERED RESPIRATORY (INHALATION) EVERY 6 HOURS PRN
Qty: 18 G | Refills: 6 | Status: SHIPPED | OUTPATIENT
Start: 2022-10-21 | End: 2023-10-30

## 2022-10-21 NOTE — PROGRESS NOTES
Subjective:      Chief Complaint: Follow-up (6mo/Asking for refill for albuterol inh)      HPI:  He is here for f/u htn, anxiety and chronic pain.  He does check his BP.  It is usually a little higher in the morning than the afternoon.  He takes Losartan.  No light headedness or dizziness.  No headaches.  His pain is controlled.  He takes the soma every day.  He gets spasms in his intercostal muscles in the morning before he takes it.  The anxiety is controlled.  It does sometimes wax and wane.  No panic attacks.  No issues with the reflux.  He isn't having any issues with claudication.  He walks a  mile daily on his treadmill.  He still sees Dr. Ramirez.  He recently stopped smoking again.  Problem Noted   Degeneration of Intervertebral Disc of Lumbar Region 10/21/2022   Gastroesophageal Reflux Disease 10/21/2022   Hyperlipidemia 10/21/2022   Hypertension 10/21/2022   Hypogonadism in Male 10/21/2022   Mixed Anxiety Depressive Disorder 10/21/2022   Neuropathy 10/21/2022   Panic Attack 10/21/2022   Peripheral Arterial Occlusive Disease 10/21/2022    s/p stents/angioplasty right lower ext.had blockage in his left calf that was stented and then re-occluded.  Then received another opinion from Dr. Ramirez who rec exercise and conditioning and that has helped alot.  He has to walk a pretty long distance before it starts to cramp.     Sciatica 10/21/2022   Chronic Pain 10/21/2022   HIV Infection 6/27/2022    has been undetectable for 13-14 years     Atherosclerosis of Native Artery of Both Lower Extremities With Intermittent Claudication 7/7/2020        The patient's Health Maintenance was reviewed and the following appears to be due:   Health Maintenance Due   Topic Date Due    Shingles Vaccine (1 of 2) Never done    TETANUS VACCINE  01/02/2019    COVID-19 Vaccine (3 - Booster for Pfizer series) 11/09/2021    Influenza Vaccine (1) Never done       Past Medical History:  Past Medical History:   Diagnosis Date    Anxiety  "    Coronary artery disease     GERD (gastroesophageal reflux disease)     HIV infection     Hyperlipidemia     Hypertension      Review of patient's allergies indicates:   Allergen Reactions    Pitavastatin      Other reaction(s): Bone pain    Sulfamethoxazole-trimethoprim Rash     Current Outpatient Medications on File Prior to Visit   Medication Sig Dispense Refill    acyclovir (ZOVIRAX) 400 MG tablet Take 1 tablet (400 mg total) by mouth 2 (two) times daily. 180 tablet 1    ALPRAZolam (XANAX) 1 MG tablet Take 1 mg by mouth 2 (two) times daily as needed.      aspirin (ECOTRIN) 81 MG EC tablet Take 81 mg by mouth.      carisoprodoL (SOMA) 350 MG tablet Take 1 tablet (350 mg total) by mouth 2 (two) times daily as needed for Muscle spasms. 60 tablet 0    clopidogreL (PLAVIX) 75 mg tablet Take 75 mg by mouth once daily.      desvenlafaxine 100 mg Tb24 Take 1 tablet by mouth once daily.      esomeprazole (NEXIUM) 40 MG capsule Take 40 mg by mouth once daily.      famotidine (PEPCID) 40 MG tablet Take 40 mg by mouth once daily.      fluticasone propionate (FLONASE) 50 mcg/actuation nasal spray 1 spray by Each Nostril route 2 (two) times daily.      lamivudine-zidovudine 150-300mg (COMBIVIR) 150-300 mg Tab Take 1 tablet by mouth 2 (two) times daily. 180 tablet 1    losartan (COZAAR) 100 MG tablet Take 100 mg by mouth once daily.      rosuvastatin (CRESTOR) 5 MG tablet Take 5 mg by mouth once daily.      VIRACEPT 625 mg tablet Take 2 tablets (1,250 mg total) by mouth 2 (two) times daily. 360 tablet 1    [DISCONTINUED] ALPRAZolam (XANAX) 1 MG tablet TAKE ONE TABLET BY MOUTH TWICE DAILY AS NEEDED FOR ANXIETY 60 tablet 5     No current facility-administered medications on file prior to visit.       Review of Systems    Objective:   /68 (BP Location: Left arm)   Pulse (!) 47   Ht 5' 9.02" (1.753 m)   Wt 62.1 kg (137 lb)   SpO2 99%   BMI 20.22 kg/m²     Physical Exam  Vitals reviewed.   Constitutional:       " Appearance: Normal appearance.   HENT:      Head: Normocephalic and atraumatic.   Cardiovascular:      Rate and Rhythm: Normal rate and regular rhythm.      Pulses: Normal pulses.      Heart sounds: Normal heart sounds. No murmur heard.    No friction rub. No gallop.   Pulmonary:      Effort: Pulmonary effort is normal. No respiratory distress.      Breath sounds: Normal breath sounds. No wheezing, rhonchi or rales.   Abdominal:      General: Abdomen is flat. There is no distension.      Palpations: Abdomen is soft. There is no mass.      Tenderness: There is no guarding or rebound.   Skin:     General: Skin is warm and dry.   Neurological:      General: No focal deficit present.      Mental Status: He is alert and oriented to person, place, and time.   Psychiatric:         Mood and Affect: Mood normal.         Behavior: Behavior normal.         Thought Content: Thought content normal.         Judgment: Judgment normal.     Assessment and Plan:     Hypertension  Cont losartan.  Add amlodipine for better control.    Hyperlipidemia  Cont crestor and recheck 6 mos.    Chronic pain  Pain is controlled. Cont soma.      Follow up in about 6 months (around 4/21/2023).    Medications Ordered This Encounter   Medications    amLODIPine (NORVASC) 2.5 MG tablet     Sig: Take 1 tablet (2.5 mg total) by mouth once daily.     Dispense:  30 tablet     Refill:  11     .     [unfilled]  Orders Placed This Encounter   Procedures    CBC Auto Differential     Standing Status:   Future     Standing Expiration Date:   12/20/2023    Comprehensive Metabolic Panel     Standing Status:   Future     Standing Expiration Date:   12/20/2023    Lipid Panel     Standing Status:   Future     Standing Expiration Date:   4/21/2024

## 2022-11-10 DIAGNOSIS — M62.838 MUSCLE SPASM: ICD-10-CM

## 2022-11-10 RX ORDER — CARISOPRODOL 350 MG/1
350 TABLET ORAL 2 TIMES DAILY PRN
Qty: 60 TABLET | Refills: 0 | Status: SHIPPED | OUTPATIENT
Start: 2022-11-10 | End: 2022-12-12 | Stop reason: SDUPTHER

## 2022-12-12 DIAGNOSIS — M62.838 MUSCLE SPASM: ICD-10-CM

## 2022-12-12 RX ORDER — CARISOPRODOL 350 MG/1
350 TABLET ORAL 2 TIMES DAILY PRN
Qty: 60 TABLET | Refills: 0 | Status: SHIPPED | OUTPATIENT
Start: 2022-12-12 | End: 2023-01-11 | Stop reason: SDUPTHER

## 2022-12-19 ENCOUNTER — LAB VISIT (OUTPATIENT)
Dept: LAB | Facility: HOSPITAL | Age: 62
End: 2022-12-19
Attending: NURSE PRACTITIONER
Payer: COMMERCIAL

## 2022-12-19 DIAGNOSIS — B20 HIV DISEASE: ICD-10-CM

## 2022-12-19 LAB
ALBUMIN SERPL-MCNC: 4.5 G/DL (ref 3.4–4.8)
ALBUMIN/GLOB SERPL: 1.3 RATIO (ref 1.1–2)
ALP SERPL-CCNC: 70 UNIT/L (ref 40–150)
ALT SERPL-CCNC: 14 UNIT/L (ref 0–55)
AST SERPL-CCNC: 14 UNIT/L (ref 5–34)
BASOPHILS # BLD AUTO: 0.01 X10(3)/MCL (ref 0–0.2)
BASOPHILS NFR BLD AUTO: 0.2 %
BILIRUBIN DIRECT+TOT PNL SERPL-MCNC: 0.6 MG/DL
BUN SERPL-MCNC: 20.1 MG/DL (ref 8.4–25.7)
CALCIUM SERPL-MCNC: 10.4 MG/DL (ref 8.8–10)
CHLORIDE SERPL-SCNC: 103 MMOL/L (ref 98–107)
CO2 SERPL-SCNC: 24 MMOL/L (ref 23–31)
CREAT SERPL-MCNC: 0.89 MG/DL (ref 0.73–1.18)
DEPRECATED CALCIDIOL+CALCIFEROL SERPL-MC: 76.8 NG/ML (ref 30–80)
EOSINOPHIL # BLD AUTO: 0.15 X10(3)/MCL (ref 0–0.9)
EOSINOPHIL NFR BLD AUTO: 2.8 %
ERYTHROCYTE [DISTWIDTH] IN BLOOD BY AUTOMATED COUNT: 11.9 % (ref 11.6–14.4)
GFR SERPLBLD CREATININE-BSD FMLA CKD-EPI: >60 MLS/MIN/1.73/M2
GLOBULIN SER-MCNC: 3.5 GM/DL (ref 2.4–3.5)
GLUCOSE SERPL-MCNC: 111 MG/DL (ref 82–115)
HCT VFR BLD AUTO: 44 % (ref 42–52)
HCV AB SERPL QL IA: NONREACTIVE
HGB BLD-MCNC: 15.9 GM/DL (ref 14–18)
IMM GRANULOCYTES # BLD AUTO: 0.01 X10(3)/MCL (ref 0–0.04)
IMM GRANULOCYTES NFR BLD AUTO: 0.2 %
LYMPHOCYTES # BLD AUTO: 2.48 X10(3)/MCL (ref 0.6–4.6)
LYMPHOCYTES NFR BLD AUTO: 47.1 %
MCH RBC QN AUTO: 41.6 PG
MCHC RBC AUTO-ENTMCNC: 36.1 MG/DL (ref 33–36)
MCV RBC AUTO: 115.2 FL (ref 80–94)
MONOCYTES # BLD AUTO: 0.59 X10(3)/MCL (ref 0.1–1.3)
MONOCYTES NFR BLD AUTO: 11.2 %
NEUTROPHILS # BLD AUTO: 2.03 X10(3)/MCL (ref 2.1–9.2)
NEUTROPHILS NFR BLD AUTO: 38.5 %
NRBC BLD AUTO-RTO: 0 % (ref 0–1)
PLATELET # BLD AUTO: 249 X10(3)/MCL (ref 140–371)
PMV BLD AUTO: 9.3 FL (ref 9.4–12.4)
POTASSIUM SERPL-SCNC: 4.6 MMOL/L (ref 3.5–5.1)
PROT SERPL-MCNC: 8 GM/DL (ref 5.8–7.6)
RBC # BLD AUTO: 3.82 X10(6)/MCL (ref 4.7–6.1)
SODIUM SERPL-SCNC: 136 MMOL/L (ref 136–145)
T PALLIDUM AB SER QL: NONREACTIVE
WBC # SPEC AUTO: 5.3 X10(3)/MCL (ref 4.5–11.5)

## 2022-12-19 PROCEDURE — 87536 HIV-1 QUANT&REVRSE TRNSCRPJ: CPT

## 2022-12-19 PROCEDURE — 86360 T CELL ABSOLUTE COUNT/RATIO: CPT

## 2022-12-19 PROCEDURE — 86361 T CELL ABSOLUTE COUNT: CPT

## 2022-12-19 PROCEDURE — 82306 VITAMIN D 25 HYDROXY: CPT

## 2022-12-19 PROCEDURE — 80053 COMPREHEN METABOLIC PANEL: CPT

## 2022-12-19 PROCEDURE — 86803 HEPATITIS C AB TEST: CPT

## 2022-12-19 PROCEDURE — 36415 COLL VENOUS BLD VENIPUNCTURE: CPT

## 2022-12-19 PROCEDURE — 85025 COMPLETE CBC W/AUTO DIFF WBC: CPT

## 2022-12-19 PROCEDURE — 86780 TREPONEMA PALLIDUM: CPT

## 2022-12-19 PROCEDURE — 86480 TB TEST CELL IMMUN MEASURE: CPT

## 2022-12-20 LAB
AGE: 62
CD3+CD4+ CELLS # BLD: 47.1 % (ref 28–48)
CD3+CD4+ CELLS # SPEC: 758.88 UNIT/L (ref 589–1505)
CD3+CD4+ CELLS NFR BLD: 30.4 %
HIV1 RNA # PLAS NAA DL=20: NORMAL COPIES/ML
LYMPHOCYTES # BLD AUTO: 2496.3 X10(3)/MCL (ref 1260–5520)
LYMPHOMA - T-CELL MARKERS SPEC-IMP: NORMAL
WBC # BLD AUTO: 5300 /MM3 (ref 4500–11500)

## 2022-12-21 LAB
GAMMA INTERFERON BACKGROUND BLD IA-ACNC: 0.07 IU/ML
M TB IFN-G BLD-IMP: NEGATIVE
M TB IFN-G CD4+ BCKGRND COR BLD-ACNC: 0.02 IU/ML
M TB IFN-G CD4+CD8+ BCKGRND COR BLD-ACNC: 0.05 IU/ML
MITOGEN IGNF BCKGRD COR BLD-ACNC: >10 IU/ML

## 2022-12-27 ENCOUNTER — OFFICE VISIT (OUTPATIENT)
Dept: INFECTIOUS DISEASES | Facility: CLINIC | Age: 62
End: 2022-12-27
Payer: COMMERCIAL

## 2022-12-27 VITALS
WEIGHT: 140 LBS | TEMPERATURE: 98 F | DIASTOLIC BLOOD PRESSURE: 76 MMHG | HEART RATE: 57 BPM | RESPIRATION RATE: 16 BRPM | SYSTOLIC BLOOD PRESSURE: 137 MMHG | BODY MASS INDEX: 20.73 KG/M2 | HEIGHT: 69 IN

## 2022-12-27 DIAGNOSIS — B00.9 HSV (HERPES SIMPLEX VIRUS) INFECTION: ICD-10-CM

## 2022-12-27 DIAGNOSIS — B20 HIV DISEASE: ICD-10-CM

## 2022-12-27 PROCEDURE — 4010F PR ACE/ARB THEARPY RXD/TAKEN: ICD-10-PCS | Mod: CPTII,,, | Performed by: NURSE PRACTITIONER

## 2022-12-27 PROCEDURE — 3078F DIAST BP <80 MM HG: CPT | Mod: CPTII,,, | Performed by: NURSE PRACTITIONER

## 2022-12-27 PROCEDURE — 99214 PR OFFICE/OUTPT VISIT, EST, LEVL IV, 30-39 MIN: ICD-10-PCS | Mod: S$PBB,,, | Performed by: NURSE PRACTITIONER

## 2022-12-27 PROCEDURE — 3008F BODY MASS INDEX DOCD: CPT | Mod: CPTII,,, | Performed by: NURSE PRACTITIONER

## 2022-12-27 PROCEDURE — 3075F SYST BP GE 130 - 139MM HG: CPT | Mod: CPTII,,, | Performed by: NURSE PRACTITIONER

## 2022-12-27 PROCEDURE — 99214 OFFICE O/P EST MOD 30 MIN: CPT | Mod: PBBFAC | Performed by: NURSE PRACTITIONER

## 2022-12-27 PROCEDURE — 3008F PR BODY MASS INDEX (BMI) DOCUMENTED: ICD-10-PCS | Mod: CPTII,,, | Performed by: NURSE PRACTITIONER

## 2022-12-27 PROCEDURE — 1159F PR MEDICATION LIST DOCUMENTED IN MEDICAL RECORD: ICD-10-PCS | Mod: CPTII,,, | Performed by: NURSE PRACTITIONER

## 2022-12-27 PROCEDURE — 1160F RVW MEDS BY RX/DR IN RCRD: CPT | Mod: CPTII,,, | Performed by: NURSE PRACTITIONER

## 2022-12-27 PROCEDURE — 1160F PR REVIEW ALL MEDS BY PRESCRIBER/CLIN PHARMACIST DOCUMENTED: ICD-10-PCS | Mod: CPTII,,, | Performed by: NURSE PRACTITIONER

## 2022-12-27 PROCEDURE — 99214 OFFICE O/P EST MOD 30 MIN: CPT | Mod: S$PBB,,, | Performed by: NURSE PRACTITIONER

## 2022-12-27 PROCEDURE — 4010F ACE/ARB THERAPY RXD/TAKEN: CPT | Mod: CPTII,,, | Performed by: NURSE PRACTITIONER

## 2022-12-27 PROCEDURE — 3075F PR MOST RECENT SYSTOLIC BLOOD PRESS GE 130-139MM HG: ICD-10-PCS | Mod: CPTII,,, | Performed by: NURSE PRACTITIONER

## 2022-12-27 PROCEDURE — 1159F MED LIST DOCD IN RCRD: CPT | Mod: CPTII,,, | Performed by: NURSE PRACTITIONER

## 2022-12-27 PROCEDURE — 3078F PR MOST RECENT DIASTOLIC BLOOD PRESSURE < 80 MM HG: ICD-10-PCS | Mod: CPTII,,, | Performed by: NURSE PRACTITIONER

## 2022-12-27 RX ORDER — LAMIVUDINE AND ZIDOVUDINE 150; 300 MG/1; MG/1
1 TABLET, FILM COATED ORAL 2 TIMES DAILY
Qty: 180 TABLET | Refills: 1 | Status: SHIPPED | OUTPATIENT
Start: 2022-12-27 | End: 2023-06-28

## 2022-12-27 RX ORDER — ACYCLOVIR 400 MG/1
400 TABLET ORAL 2 TIMES DAILY
Qty: 180 TABLET | Refills: 1 | Status: SHIPPED | OUTPATIENT
Start: 2022-12-27 | End: 2023-06-28 | Stop reason: SDUPTHER

## 2022-12-27 RX ORDER — NELFINAVIR MESYLATE 625 MG/1
1250 TABLET, FILM COATED ORAL 2 TIMES DAILY
Qty: 360 TABLET | Refills: 1 | Status: SHIPPED | OUTPATIENT
Start: 2022-12-27 | End: 2023-06-28

## 2022-12-27 NOTE — PROGRESS NOTES
Subjective:       Patient ID: Reyes Ren is a 61 y.o. male.    Chief Complaint: Follow-up (HIV)    12/27/22  Erik is a 62 yo WM here today for HIV f/u visit.  He takes Combivir & Viracept daily, tolerates well with viral suppression.  Labs 12/19/22 VL UD, CD4 759.  Vitamin D level is up at 76.8, Calcium level 10.4.  He tells me that he has been taking vitamin d3 2000 units BID.  Will decrease to once daily dosing as recommended on vitamin d supplement.  He declines vaccinations for flu & shingles today as recommended.  HSV remains suppressed on acyclovir BID.  All questions answered & concerns addressed.     6/27/22  Erik is a 62 yo WM presenting today for HIV f/u visit.  He reports 100% adherent to Combivir & Viracept.  He is virally suppressed & labs are stable.  Labs collected 6/22/22 VL not collected, CD4 875.  Will draw today for VL.  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  He is still not interested in ART revision, but will explore fillers for cosmetic purposes.  He is taking acyclovir daily for HSV suppression, no outbreaks. Remains in care with PCP Dr. Perez.  All questions answered & concerns addressed.      12/27/21  Erik is a 61 yo WM presenting today for HIV f/u visit.  He reports 100% adherent to Combivir & Viracept.  He tolerates this regimen well & is resistant to ART revision despite recommendations to do so.  Labs collected 12/20/21 VL <20, CD4 not collected.  Renal function & electrolytes stable, CBC WNL.  He remains in care with PCP Dr. Perez for management of comorbidities.  He has a longstanding diagnosis of anxiety which has been managed with Xanax bid for the past 30 years or so.  He tells me that his oldest brother passed away 11/2021 due to self-inflicted gunshot.  He is continuing to struggle with same. Encouraged him to set up evaluation with a MH provider to assist with same.  Voiced understanding.  He tells me that he  occasionally smokes cigarettes, but no longer on a regular basis.  All questions answered & concerns addressed.       Review of Systems   Constitutional: Negative.    HENT: Negative.     Respiratory: Negative.     Cardiovascular: Negative.    Gastrointestinal: Negative.    Genitourinary: Negative.    Integumentary:  Negative.   Neurological: Negative.    Hematological: Negative.    Psychiatric/Behavioral: Negative.         Objective:      Physical Exam  Vitals reviewed.   Constitutional:       General: He is not in acute distress.     Appearance: Normal appearance. He is not toxic-appearing.   HENT:      Mouth/Throat:      Mouth: Mucous membranes are moist.      Pharynx: Oropharynx is clear.   Eyes:      Conjunctiva/sclera: Conjunctivae normal.   Cardiovascular:      Rate and Rhythm: Normal rate and regular rhythm.   Pulmonary:      Effort: Pulmonary effort is normal. No respiratory distress.      Breath sounds: Normal breath sounds.   Abdominal:      General: Abdomen is flat. Bowel sounds are normal.      Palpations: Abdomen is soft.   Musculoskeletal:         General: Normal range of motion.      Cervical back: Normal range of motion.   Lymphadenopathy:      Cervical: No cervical adenopathy.   Skin:     General: Skin is warm and dry.   Neurological:      General: No focal deficit present.      Mental Status: He is alert and oriented to person, place, and time. Mental status is at baseline.   Psychiatric:         Mood and Affect: Mood normal.         Behavior: Behavior normal.       Assessment:       Problem List Items Addressed This Visit    None      Plan:           HIV disease  -     HIV-1 RNA, Quantitative, PCR with Reflex to Genotype; Future; Expected date: 12/01/2023  -     CD4 T-[place] Cells; Future; Expected date: 12/01/2023  -     Urinalysis; Future; Expected date: 12/01/2023  -     Vitamin D; Future; Expected date: 12/01/2023  -     TSH; Future; Expected date: 12/01/2023  -     Hemoglobin A1C; Future;  Expected date: 12/01/2023  -     Hepatitis C Antibody; Future; Expected date: 12/01/2023  -     SYPHILIS ANTIBODY (WITH REFLEX RPR); Future; Expected date: 12/01/2023  -     Lipid Panel; Future; Expected date: 12/01/2023  -     Chlamydia/GC, PCR; Future; Expected date: 12/01/2023  -     Comprehensive Metabolic Panel; Future; Expected date: 12/01/2023  -     CBC Auto Differential; Future; Expected date: 12/01/2023  -     Quantiferon Gold TB; Future; Expected date: 12/01/2023  -     lamivudine-zidovudine 150-300mg (COMBIVIR) 150-300 mg Tab; Take 1 tablet by mouth 2 (two) times daily.  Dispense: 180 tablet; Refill: 1  -     VIRACEPT 625 mg tablet; Take 2 tablets (1,250 mg total) by mouth 2 (two) times daily.  Dispense: 360 tablet; Refill: 1  Adherence/sexual health counseling done.  Continue Combivir/Viracept as prescribed.   Not interested in ART revision or simplification.   Labs 1 wk prior to next visit.  RTC 6 months with Corrine  Keep f/u with PCP.     HSV (herpes simplex virus) infection  -     acyclovir (ZOVIRAX) 400 MG tablet; Take 1 tablet (400 mg total) by mouth 2 (two) times daily.  Dispense: 180 tablet; Refill: 1  Suppressed.  Continue acyclovir for suppression.

## 2023-01-11 DIAGNOSIS — M62.838 MUSCLE SPASM: ICD-10-CM

## 2023-01-11 RX ORDER — CARISOPRODOL 350 MG/1
350 TABLET ORAL 2 TIMES DAILY PRN
Qty: 60 TABLET | Refills: 0 | Status: SHIPPED | OUTPATIENT
Start: 2023-01-11 | End: 2023-02-07

## 2023-03-06 DIAGNOSIS — M62.838 MUSCLE SPASM: ICD-10-CM

## 2023-03-06 RX ORDER — CARISOPRODOL 350 MG/1
TABLET ORAL
Qty: 60 TABLET | Refills: 0 | Status: SHIPPED | OUTPATIENT
Start: 2023-03-06 | End: 2023-04-05 | Stop reason: SDUPTHER

## 2023-04-05 DIAGNOSIS — M62.838 MUSCLE SPASM: ICD-10-CM

## 2023-04-05 RX ORDER — CARISOPRODOL 350 MG/1
TABLET ORAL
Qty: 60 TABLET | Refills: 0 | Status: SHIPPED | OUTPATIENT
Start: 2023-04-05 | End: 2023-05-08

## 2023-04-28 ENCOUNTER — LAB VISIT (OUTPATIENT)
Dept: LAB | Facility: HOSPITAL | Age: 63
End: 2023-04-28
Attending: INTERNAL MEDICINE
Payer: COMMERCIAL

## 2023-04-28 DIAGNOSIS — E78.5 HYPERLIPIDEMIA, UNSPECIFIED HYPERLIPIDEMIA TYPE: ICD-10-CM

## 2023-04-28 DIAGNOSIS — G89.29 OTHER CHRONIC PAIN: ICD-10-CM

## 2023-04-28 DIAGNOSIS — I10 PRIMARY HYPERTENSION: ICD-10-CM

## 2023-04-28 LAB
ALBUMIN SERPL-MCNC: 4.1 G/DL (ref 3.4–4.8)
ALBUMIN/GLOB SERPL: 1.4 RATIO (ref 1.1–2)
ALP SERPL-CCNC: 80 UNIT/L (ref 40–150)
ALT SERPL-CCNC: 17 UNIT/L (ref 0–55)
AST SERPL-CCNC: 15 UNIT/L (ref 5–34)
BASOPHILS # BLD AUTO: 0.03 X10(3)/MCL (ref 0–0.2)
BASOPHILS NFR BLD AUTO: 0.5 %
BILIRUBIN DIRECT+TOT PNL SERPL-MCNC: 0.4 MG/DL
BUN SERPL-MCNC: 12.8 MG/DL (ref 8.4–25.7)
CALCIUM SERPL-MCNC: 9.4 MG/DL (ref 8.8–10)
CHLORIDE SERPL-SCNC: 105 MMOL/L (ref 98–107)
CHOLEST SERPL-MCNC: 197 MG/DL
CHOLEST/HDLC SERPL: 5 {RATIO} (ref 0–5)
CO2 SERPL-SCNC: 24 MMOL/L (ref 23–31)
CREAT SERPL-MCNC: 0.88 MG/DL (ref 0.73–1.18)
EOSINOPHIL # BLD AUTO: 0.19 X10(3)/MCL (ref 0–0.9)
EOSINOPHIL NFR BLD AUTO: 3 %
ERYTHROCYTE [DISTWIDTH] IN BLOOD BY AUTOMATED COUNT: 11.9 % (ref 11.5–17)
GFR SERPLBLD CREATININE-BSD FMLA CKD-EPI: >60 MLS/MIN/1.73/M2
GLOBULIN SER-MCNC: 3 GM/DL (ref 2.4–3.5)
GLUCOSE SERPL-MCNC: 109 MG/DL (ref 82–115)
HCT VFR BLD AUTO: 39.8 % (ref 42–52)
HDLC SERPL-MCNC: 41 MG/DL (ref 35–60)
HGB BLD-MCNC: 14.4 G/DL (ref 14–18)
IMM GRANULOCYTES # BLD AUTO: 0.01 X10(3)/MCL (ref 0–0.04)
IMM GRANULOCYTES NFR BLD AUTO: 0.2 %
LDLC SERPL CALC-MCNC: 122 MG/DL (ref 50–140)
LYMPHOCYTES # BLD AUTO: 3.6 X10(3)/MCL (ref 0.6–4.6)
LYMPHOCYTES NFR BLD AUTO: 55.9 %
MCH RBC QN AUTO: 41.6 PG (ref 27–31)
MCHC RBC AUTO-ENTMCNC: 36.2 G/DL (ref 33–36)
MCV RBC AUTO: 115 FL (ref 80–94)
MONOCYTES # BLD AUTO: 0.67 X10(3)/MCL (ref 0.1–1.3)
MONOCYTES NFR BLD AUTO: 10.4 %
NEUTROPHILS # BLD AUTO: 1.94 X10(3)/MCL (ref 2.1–9.2)
NEUTROPHILS NFR BLD AUTO: 30 %
NRBC BLD AUTO-RTO: 0 %
PLATELET # BLD AUTO: 207 X10(3)/MCL (ref 130–400)
PMV BLD AUTO: 9.1 FL (ref 7.4–10.4)
POTASSIUM SERPL-SCNC: 4 MMOL/L (ref 3.5–5.1)
PROT SERPL-MCNC: 7.1 GM/DL (ref 5.8–7.6)
RBC # BLD AUTO: 3.46 X10(6)/MCL (ref 4.7–6.1)
SODIUM SERPL-SCNC: 137 MMOL/L (ref 136–145)
TRIGL SERPL-MCNC: 172 MG/DL (ref 34–140)
VLDLC SERPL CALC-MCNC: 34 MG/DL
WBC # SPEC AUTO: 6.4 X10(3)/MCL (ref 4.5–11.5)

## 2023-04-28 PROCEDURE — 80053 COMPREHEN METABOLIC PANEL: CPT

## 2023-04-28 PROCEDURE — 36415 COLL VENOUS BLD VENIPUNCTURE: CPT

## 2023-04-28 PROCEDURE — 85025 COMPLETE CBC W/AUTO DIFF WBC: CPT

## 2023-04-28 PROCEDURE — 80061 LIPID PANEL: CPT

## 2023-05-03 ENCOUNTER — TELEPHONE (OUTPATIENT)
Dept: INTERNAL MEDICINE | Facility: CLINIC | Age: 63
End: 2023-05-03
Payer: COMMERCIAL

## 2023-05-03 NOTE — TELEPHONE ENCOUNTER
----- Message from Maria L Ventura LPN sent at 5/3/2023 10:57 AM CDT -----  Regarding: NAYELI Perez 5/10/23 @10:20a  Are there any outstanding tasks in the chart? no    Is there any documentation of tasks? no    Has patient seen another physician, been to the ER, C, or admitted to hospital since last visit?    Has the patient done blood work or imaging since last visit?

## 2023-05-04 NOTE — TELEPHONE ENCOUNTER
----- Message from Cathie Shaikh sent at 5/4/2023 11:10 AM CDT -----  .Type:  RX Refill Request    Who Called: pt  Refill or New Rx:refill  RX Name and Strength:carisoprodoL (SOMA) 350 MG tablet  How is the patient currently taking it?  2XDay  Is this a 30 day or 90 day RX:90  Preferred Pharmacy with phone number:[facility] [address]  Local or Mail Order:local   Ordering Provider:Ana  Would the patient rather a call back or a response via [facility]? Call back   Best Call Back Number:[phone]  Additional Information: requesting refill

## 2023-05-08 DIAGNOSIS — E78.5 HYPERLIPIDEMIA, UNSPECIFIED HYPERLIPIDEMIA TYPE: ICD-10-CM

## 2023-05-08 DIAGNOSIS — K21.9 GASTROESOPHAGEAL REFLUX DISEASE, UNSPECIFIED WHETHER ESOPHAGITIS PRESENT: ICD-10-CM

## 2023-05-08 DIAGNOSIS — M62.838 MUSCLE SPASM: ICD-10-CM

## 2023-05-08 RX ORDER — FAMOTIDINE 40 MG/1
TABLET, FILM COATED ORAL
Qty: 30 TABLET | Refills: 11 | Status: SHIPPED | OUTPATIENT
Start: 2023-05-08 | End: 2023-05-09

## 2023-05-08 RX ORDER — CLOPIDOGREL BISULFATE 75 MG/1
75 TABLET ORAL DAILY
Qty: 30 TABLET | Refills: 3 | Status: SHIPPED | OUTPATIENT
Start: 2023-05-08

## 2023-05-08 RX ORDER — CARISOPRODOL 350 MG/1
TABLET ORAL
Qty: 60 TABLET | Refills: 0 | Status: SHIPPED | OUTPATIENT
Start: 2023-05-08 | End: 2023-06-07 | Stop reason: SDUPTHER

## 2023-05-08 RX ORDER — ROSUVASTATIN CALCIUM 5 MG/1
TABLET, COATED ORAL
Qty: 30 TABLET | Refills: 11 | Status: SHIPPED | OUTPATIENT
Start: 2023-05-08 | End: 2023-05-09

## 2023-05-08 RX ORDER — ESOMEPRAZOLE MAGNESIUM 40 MG/1
CAPSULE, DELAYED RELEASE ORAL
Qty: 30 CAPSULE | Refills: 11 | Status: SHIPPED | OUTPATIENT
Start: 2023-05-08 | End: 2023-05-09

## 2023-05-09 DIAGNOSIS — E78.5 HYPERLIPIDEMIA, UNSPECIFIED HYPERLIPIDEMIA TYPE: ICD-10-CM

## 2023-05-09 DIAGNOSIS — K21.9 GASTROESOPHAGEAL REFLUX DISEASE, UNSPECIFIED WHETHER ESOPHAGITIS PRESENT: ICD-10-CM

## 2023-05-09 RX ORDER — FAMOTIDINE 40 MG/1
TABLET, FILM COATED ORAL
Qty: 30 TABLET | Refills: 11 | Status: SHIPPED | OUTPATIENT
Start: 2023-05-09

## 2023-05-09 RX ORDER — ROSUVASTATIN CALCIUM 5 MG/1
TABLET, COATED ORAL
Qty: 30 TABLET | Refills: 11 | Status: SHIPPED | OUTPATIENT
Start: 2023-05-09 | End: 2023-05-10 | Stop reason: SDUPTHER

## 2023-05-09 RX ORDER — ESOMEPRAZOLE MAGNESIUM 40 MG/1
CAPSULE, DELAYED RELEASE ORAL
Qty: 30 CAPSULE | Refills: 11 | Status: SHIPPED | OUTPATIENT
Start: 2023-05-09

## 2023-05-10 ENCOUNTER — OFFICE VISIT (OUTPATIENT)
Dept: INTERNAL MEDICINE | Facility: CLINIC | Age: 63
End: 2023-05-10
Payer: COMMERCIAL

## 2023-05-10 VITALS
OXYGEN SATURATION: 99 % | SYSTOLIC BLOOD PRESSURE: 120 MMHG | WEIGHT: 138 LBS | BODY MASS INDEX: 20.44 KG/M2 | DIASTOLIC BLOOD PRESSURE: 58 MMHG | HEIGHT: 69 IN | HEART RATE: 55 BPM | RESPIRATION RATE: 18 BRPM

## 2023-05-10 DIAGNOSIS — I10 PRIMARY HYPERTENSION: ICD-10-CM

## 2023-05-10 DIAGNOSIS — Z00.00 WELLNESS EXAMINATION: ICD-10-CM

## 2023-05-10 DIAGNOSIS — I77.9 PERIPHERAL ARTERIAL OCCLUSIVE DISEASE: ICD-10-CM

## 2023-05-10 DIAGNOSIS — Z23 NEED FOR TETANUS BOOSTER: ICD-10-CM

## 2023-05-10 DIAGNOSIS — M51.36 DEGENERATION OF INTERVERTEBRAL DISC OF LUMBAR REGION: ICD-10-CM

## 2023-05-10 DIAGNOSIS — F41.8 MIXED ANXIETY DEPRESSIVE DISORDER: Primary | ICD-10-CM

## 2023-05-10 DIAGNOSIS — B20 HIV INFECTION, UNSPECIFIED SYMPTOM STATUS: ICD-10-CM

## 2023-05-10 DIAGNOSIS — E29.1 HYPOGONADISM IN MALE: ICD-10-CM

## 2023-05-10 DIAGNOSIS — E78.5 HYPERLIPIDEMIA, UNSPECIFIED HYPERLIPIDEMIA TYPE: ICD-10-CM

## 2023-05-10 PROCEDURE — 90714 TD VACCINE GREATER THAN OR EQUAL TO 7YO WITH PRESERVATIVE IM: ICD-10-PCS | Mod: ,,, | Performed by: INTERNAL MEDICINE

## 2023-05-10 PROCEDURE — 90471 IMMUNIZATION ADMIN: CPT | Mod: ,,, | Performed by: INTERNAL MEDICINE

## 2023-05-10 PROCEDURE — 4010F PR ACE/ARB THEARPY RXD/TAKEN: ICD-10-PCS | Mod: CPTII,,, | Performed by: INTERNAL MEDICINE

## 2023-05-10 PROCEDURE — 1160F RVW MEDS BY RX/DR IN RCRD: CPT | Mod: CPTII,,, | Performed by: INTERNAL MEDICINE

## 2023-05-10 PROCEDURE — 90471 TD VACCINE GREATER THAN OR EQUAL TO 7YO WITH PRESERVATIVE IM: ICD-10-PCS | Mod: ,,, | Performed by: INTERNAL MEDICINE

## 2023-05-10 PROCEDURE — 3074F SYST BP LT 130 MM HG: CPT | Mod: CPTII,,, | Performed by: INTERNAL MEDICINE

## 2023-05-10 PROCEDURE — 1160F PR REVIEW ALL MEDS BY PRESCRIBER/CLIN PHARMACIST DOCUMENTED: ICD-10-PCS | Mod: CPTII,,, | Performed by: INTERNAL MEDICINE

## 2023-05-10 PROCEDURE — 4010F ACE/ARB THERAPY RXD/TAKEN: CPT | Mod: CPTII,,, | Performed by: INTERNAL MEDICINE

## 2023-05-10 PROCEDURE — 3008F PR BODY MASS INDEX (BMI) DOCUMENTED: ICD-10-PCS | Mod: CPTII,,, | Performed by: INTERNAL MEDICINE

## 2023-05-10 PROCEDURE — 3074F PR MOST RECENT SYSTOLIC BLOOD PRESSURE < 130 MM HG: ICD-10-PCS | Mod: CPTII,,, | Performed by: INTERNAL MEDICINE

## 2023-05-10 PROCEDURE — 3078F PR MOST RECENT DIASTOLIC BLOOD PRESSURE < 80 MM HG: ICD-10-PCS | Mod: CPTII,,, | Performed by: INTERNAL MEDICINE

## 2023-05-10 PROCEDURE — 3078F DIAST BP <80 MM HG: CPT | Mod: CPTII,,, | Performed by: INTERNAL MEDICINE

## 2023-05-10 PROCEDURE — 1159F MED LIST DOCD IN RCRD: CPT | Mod: CPTII,,, | Performed by: INTERNAL MEDICINE

## 2023-05-10 PROCEDURE — 3008F BODY MASS INDEX DOCD: CPT | Mod: CPTII,,, | Performed by: INTERNAL MEDICINE

## 2023-05-10 PROCEDURE — 99396 PREV VISIT EST AGE 40-64: CPT | Mod: 25,,, | Performed by: INTERNAL MEDICINE

## 2023-05-10 PROCEDURE — 90714 TD VACC NO PRESV 7 YRS+ IM: CPT | Mod: ,,, | Performed by: INTERNAL MEDICINE

## 2023-05-10 PROCEDURE — 1159F PR MEDICATION LIST DOCUMENTED IN MEDICAL RECORD: ICD-10-PCS | Mod: CPTII,,, | Performed by: INTERNAL MEDICINE

## 2023-05-10 PROCEDURE — 99396 PR PREVENTIVE VISIT,EST,40-64: ICD-10-PCS | Mod: 25,,, | Performed by: INTERNAL MEDICINE

## 2023-05-10 RX ORDER — ROSUVASTATIN CALCIUM 10 MG/1
10 TABLET, COATED ORAL NIGHTLY
Qty: 90 TABLET | Refills: 3 | Status: SHIPPED | OUTPATIENT
Start: 2023-05-10 | End: 2023-11-22 | Stop reason: SDUPTHER

## 2023-05-10 NOTE — ASSESSMENT & PLAN NOTE
Stable.  Cont pristiq and xanax.  I offered a taper off the xanax.  He is scared because of his h/o anxiety.

## 2023-05-10 NOTE — PROGRESS NOTES
Subjective:      Chief Complaint: Annual Exam (Discuss labs /)      HPI: He is here for his wellness.  He has no complaints.  He does check his BP and it will usually be good but occasionally up to 140.  No issues with the reflux.  He is on pepcid and nexium.  He is on plavix for his PAD.  He isn't having any claudication sx.  He walks on a treadmill 3 times per week.  The anxiety is controlled.  He takes the xanax bid.  His is also on pristiq.  He feels the anxiety is related to his hiv meds.    Problem Noted   Wellness Examination 5/10/2023   Degeneration of Intervertebral Disc of Lumbar Region 10/21/2022   Gastroesophageal Reflux Disease 10/21/2022   Hyperlipidemia 10/21/2022   Hypertension 10/21/2022   Hypogonadism in Male 10/21/2022   Mixed Anxiety Depressive Disorder 10/21/2022   Neuropathy 10/21/2022   Panic Attack 10/21/2022   Peripheral Arterial Occlusive Disease 10/21/2022    s/p stents/angioplasty right lower ext.had blockage in his left calf that was stented and then re-occluded.  Then received another opinion from Dr. Ramirez who rec exercise and conditioning and that has helped alot.  He has to walk a pretty long distance before it starts to cramp.       Sciatica 10/21/2022   Chronic Pain 10/21/2022   HIV Infection 6/27/2022    has been undetectable for 13-14 years       Atherosclerosis of Native Artery of Both Lower Extremities With Intermittent Claudication 7/7/2020        The patient's Health Maintenance was reviewed and the following appears to be due:   Health Maintenance Due   Topic Date Due    Shingles Vaccine (1 of 2) Never done    TETANUS VACCINE  01/02/2019    COVID-19 Vaccine (3 - Booster for Pfizer series) 11/09/2021    PROSTATE-SPECIFIC ANTIGEN  04/18/2023       Past Medical History:  Past Medical History:   Diagnosis Date    Anxiety     Coronary artery disease     GERD (gastroesophageal reflux disease)     HIV infection     Hyperlipidemia     Hypertension      Review of patient's  allergies indicates:   Allergen Reactions    Pitavastatin      Other reaction(s): Bone pain    Sulfamethoxazole-trimethoprim Rash     Current Outpatient Medications on File Prior to Visit   Medication Sig Dispense Refill    acyclovir (ZOVIRAX) 400 MG tablet Take 1 tablet (400 mg total) by mouth 2 (two) times daily. 180 tablet 1    albuterol (PROVENTIL HFA) 90 mcg/actuation inhaler Inhale 2 puffs into the lungs every 6 (six) hours as needed for Wheezing or Shortness of Breath. Rescue 18 g 6    ALPRAZolam (XANAX) 1 MG tablet TAKE ONE TABLET BY MOUTH TWICE DAILY AS NEEDED FOR ANXIETY 60 tablet 5    amLODIPine (NORVASC) 2.5 MG tablet Take 1 tablet (2.5 mg total) by mouth once daily. 30 tablet 11    aspirin (ECOTRIN) 81 MG EC tablet Take 81 mg by mouth.      carisoprodoL (SOMA) 350 MG tablet TAKE 1 TABLET BY MOUTH 2 TIMES A DAY AS NEEDED FOR MUSCLE SPASMS 60 tablet 0    clopidogreL (PLAVIX) 75 mg tablet Take 1 tablet (75 mg total) by mouth once daily. 30 tablet 3    esomeprazole (NEXIUM) 40 MG capsule TAKE 1 CAPSULE BY MOUTH ONCE DAILY 30 capsule 11    famotidine (PEPCID) 40 MG tablet TAKE 1 TABLET BY MOUTH AT BEDTIME 30 tablet 11    fluticasone propionate (FLONASE) 50 mcg/actuation nasal spray USE TWO SPRAYS IN EACH NOSTRIL ONCE DAILY 16 g 5    lamivudine-zidovudine 150-300mg (COMBIVIR) 150-300 mg Tab Take 1 tablet by mouth 2 (two) times daily. 180 tablet 1    losartan (COZAAR) 100 MG tablet TAKE 1 TABLET BY MOUTH ONCE DAILY 90 tablet 3    VIRACEPT 625 mg tablet Take 2 tablets (1,250 mg total) by mouth 2 (two) times daily. 360 tablet 1    [DISCONTINUED] rosuvastatin (CRESTOR) 5 MG tablet TAKE ONE TABLET BY MOUTH AT BEDTIME 30 tablet 11    desvenlafaxine 100 mg Tb24 Take 1 tablet by mouth once daily.       No current facility-administered medications on file prior to visit.       Review of Systems    Objective:   BP (!) 120/58 (BP Location: Left arm, Patient Position: Sitting, BP Method: Small (Manual))   Pulse (!) 55   " Resp 18   Ht 5' 9.02" (1.753 m)   Wt 62.6 kg (138 lb)   SpO2 99%   BMI 20.37 kg/m²     Physical Exam  Vitals reviewed.   Constitutional:       Appearance: Normal appearance.   HENT:      Head: Normocephalic and atraumatic.   Eyes:      Conjunctiva/sclera: Conjunctivae normal.   Cardiovascular:      Rate and Rhythm: Normal rate and regular rhythm.      Pulses: Normal pulses.      Heart sounds: Normal heart sounds. No murmur heard.    No friction rub. No gallop.   Pulmonary:      Effort: Pulmonary effort is normal. No respiratory distress.      Breath sounds: Normal breath sounds. No wheezing, rhonchi or rales.   Abdominal:      General: Abdomen is flat. There is no distension.      Palpations: Abdomen is soft. There is no mass.      Tenderness: There is no guarding or rebound.   Skin:     General: Skin is warm and dry.   Neurological:      General: No focal deficit present.      Mental Status: He is alert and oriented to person, place, and time.   Psychiatric:         Mood and Affect: Mood normal.         Behavior: Behavior normal.         Thought Content: Thought content normal.         Judgment: Judgment normal.     Assessment and Plan:     Wellness examination  Health Maintenance Due   Topic Date Due    Shingles Vaccine (1 of 2) Never done    TETANUS VACCINE  01/02/2019    COVID-19 Vaccine (3 - Booster for Pfizer series) 11/09/2021    PROSTATE-SPECIFIC ANTIGEN  04/18/2023     Will check psa and testosterone with his labs in June.  It was missed this time.  Tetanus booster today.  Shingrix also recommended.      Hyperlipidemia  Increase crestor for better LDL    Hypertension  Stable, cont amlodipine and losartan.    Peripheral arterial occlusive disease  He is currently on plavix and asa and followed by Dr. Ramirez.    Mixed anxiety depressive disorder  Stable.  Cont pristiq and xanax.  I offered a taper off the xanax.  He is scared because of his h/o anxiety.    Follow up in about 6 months (around " 11/10/2023).    Medications Ordered This Encounter   Medications    rosuvastatin (CRESTOR) 10 MG tablet     Sig: Take 1 tablet (10 mg total) by mouth every evening.     Dispense:  90 tablet     Refill:  3     [unfilled]  Orders Placed This Encounter   Procedures    Testosterone     Standing Status:   Future     Standing Expiration Date:   5/9/2024    PSA, Screening     Standing Status:   Future     Standing Expiration Date:   7/8/2024

## 2023-05-10 NOTE — ASSESSMENT & PLAN NOTE
Health Maintenance Due   Topic Date Due    Shingles Vaccine (1 of 2) Never done    TETANUS VACCINE  01/02/2019    COVID-19 Vaccine (3 - Booster for Pfizer series) 11/09/2021    PROSTATE-SPECIFIC ANTIGEN  04/18/2023     Will check psa and testosterone with his labs in June.  It was missed this time.  Tetanus booster today.  Shingrix also recommended.

## 2023-06-07 DIAGNOSIS — M62.838 MUSCLE SPASM: ICD-10-CM

## 2023-06-07 RX ORDER — CARISOPRODOL 350 MG/1
TABLET ORAL
Qty: 60 TABLET | Refills: 0 | Status: SHIPPED | OUTPATIENT
Start: 2023-06-07 | End: 2023-07-06 | Stop reason: SDUPTHER

## 2023-06-20 ENCOUNTER — LAB VISIT (OUTPATIENT)
Dept: LAB | Facility: HOSPITAL | Age: 63
End: 2023-06-20
Attending: NURSE PRACTITIONER
Payer: COMMERCIAL

## 2023-06-20 ENCOUNTER — TELEPHONE (OUTPATIENT)
Dept: INTERNAL MEDICINE | Facility: CLINIC | Age: 63
End: 2023-06-20
Payer: COMMERCIAL

## 2023-06-20 DIAGNOSIS — E29.1 HYPOGONADISM IN MALE: ICD-10-CM

## 2023-06-20 DIAGNOSIS — B20 HIV DISEASE: ICD-10-CM

## 2023-06-20 LAB
ALBUMIN SERPL-MCNC: 4.3 G/DL (ref 3.4–4.8)
ALBUMIN/GLOB SERPL: 1.3 RATIO (ref 1.1–2)
ALP SERPL-CCNC: 80 UNIT/L (ref 40–150)
ALT SERPL-CCNC: 14 UNIT/L (ref 0–55)
APPEARANCE UR: CLEAR
AST SERPL-CCNC: 13 UNIT/L (ref 5–34)
BACTERIA #/AREA URNS AUTO: ABNORMAL /HPF
BASOPHILS # BLD AUTO: 0.02 X10(3)/MCL
BASOPHILS NFR BLD AUTO: 0.4 %
BILIRUB UR QL STRIP.AUTO: NEGATIVE MG/DL
BILIRUBIN DIRECT+TOT PNL SERPL-MCNC: 0.6 MG/DL
BUN SERPL-MCNC: 13.8 MG/DL (ref 8.4–25.7)
C TRACH DNA SPEC QL NAA+PROBE: NOT DETECTED
CALCIUM SERPL-MCNC: 9.8 MG/DL (ref 8.8–10)
CHLORIDE SERPL-SCNC: 105 MMOL/L (ref 98–107)
CHOLEST SERPL-MCNC: 174 MG/DL
CHOLEST/HDLC SERPL: 4 {RATIO} (ref 0–5)
CO2 SERPL-SCNC: 24 MMOL/L (ref 23–31)
COLOR UR: ABNORMAL
CREAT SERPL-MCNC: 0.88 MG/DL (ref 0.73–1.18)
DEPRECATED CALCIDIOL+CALCIFEROL SERPL-MC: 39.2 NG/ML (ref 30–80)
EOSINOPHIL # BLD AUTO: 0.12 X10(3)/MCL (ref 0–0.9)
EOSINOPHIL NFR BLD AUTO: 2.4 %
ERYTHROCYTE [DISTWIDTH] IN BLOOD BY AUTOMATED COUNT: 11.7 % (ref 11.5–17)
EST. AVERAGE GLUCOSE BLD GHB EST-MCNC: 99.7 MG/DL
GFR SERPLBLD CREATININE-BSD FMLA CKD-EPI: >60 MLS/MIN/1.73/M2
GLOBULIN SER-MCNC: 3.2 GM/DL (ref 2.4–3.5)
GLUCOSE SERPL-MCNC: 104 MG/DL (ref 82–115)
GLUCOSE UR QL STRIP.AUTO: NORMAL MG/DL
HBA1C MFR BLD: 5.1 %
HCT VFR BLD AUTO: 43.1 % (ref 42–52)
HCV AB SERPL QL IA: NONREACTIVE
HDLC SERPL-MCNC: 39 MG/DL (ref 35–60)
HGB BLD-MCNC: 15.2 G/DL (ref 14–18)
HYALINE CASTS #/AREA URNS LPF: ABNORMAL /LPF
IMM GRANULOCYTES # BLD AUTO: 0.01 X10(3)/MCL (ref 0–0.04)
IMM GRANULOCYTES NFR BLD AUTO: 0.2 %
KETONES UR QL STRIP.AUTO: NEGATIVE MG/DL
LDLC SERPL CALC-MCNC: 104 MG/DL (ref 50–140)
LEUKOCYTE ESTERASE UR QL STRIP.AUTO: NEGATIVE UNIT/L
LYMPHOCYTES # BLD AUTO: 2.41 X10(3)/MCL (ref 0.6–4.6)
LYMPHOCYTES NFR BLD AUTO: 47.3 %
MCH RBC QN AUTO: 40.9 PG (ref 27–31)
MCHC RBC AUTO-ENTMCNC: 35.3 G/DL (ref 33–36)
MCV RBC AUTO: 115.9 FL (ref 80–94)
MONOCYTES # BLD AUTO: 0.53 X10(3)/MCL (ref 0.1–1.3)
MONOCYTES NFR BLD AUTO: 10.4 %
MUCOUS THREADS URNS QL MICRO: ABNORMAL /LPF
N GONORRHOEA DNA SPEC QL NAA+PROBE: NOT DETECTED
NEUTROPHILS # BLD AUTO: 2.01 X10(3)/MCL (ref 2.1–9.2)
NEUTROPHILS NFR BLD AUTO: 39.3 %
NITRITE UR QL STRIP.AUTO: NEGATIVE
NRBC BLD AUTO-RTO: 0 %
PH UR STRIP.AUTO: 5.5 [PH]
PLATELET # BLD AUTO: 219 X10(3)/MCL (ref 130–400)
PMV BLD AUTO: 9 FL (ref 7.4–10.4)
POTASSIUM SERPL-SCNC: 4.4 MMOL/L (ref 3.5–5.1)
PROT SERPL-MCNC: 7.5 GM/DL (ref 5.8–7.6)
PROT UR QL STRIP.AUTO: NEGATIVE MG/DL
PSA SERPL-MCNC: 0.87 NG/ML
RBC # BLD AUTO: 3.72 X10(6)/MCL (ref 4.7–6.1)
RBC #/AREA URNS AUTO: ABNORMAL /HPF
RBC UR QL AUTO: NEGATIVE UNIT/L
SODIUM SERPL-SCNC: 138 MMOL/L (ref 136–145)
SP GR UR STRIP.AUTO: 1.02
SQUAMOUS #/AREA URNS LPF: ABNORMAL /HPF
T PALLIDUM AB SER QL: NONREACTIVE
TESTOST SERPL-MCNC: 416.79 NG/DL (ref 220.91–715.81)
TRIGL SERPL-MCNC: 156 MG/DL (ref 34–140)
TSH SERPL-ACNC: 1.59 UIU/ML (ref 0.35–4.94)
UROBILINOGEN UR STRIP-ACNC: NORMAL MG/DL
VLDLC SERPL CALC-MCNC: 31 MG/DL
WBC # SPEC AUTO: 5.1 X10(3)/MCL (ref 4.5–11.5)
WBC #/AREA URNS AUTO: ABNORMAL /HPF

## 2023-06-20 PROCEDURE — 80053 COMPREHEN METABOLIC PANEL: CPT

## 2023-06-20 PROCEDURE — 36415 COLL VENOUS BLD VENIPUNCTURE: CPT

## 2023-06-20 PROCEDURE — 87536 HIV-1 QUANT&REVRSE TRNSCRPJ: CPT

## 2023-06-20 PROCEDURE — 84443 ASSAY THYROID STIM HORMONE: CPT

## 2023-06-20 PROCEDURE — 87591 N.GONORRHOEAE DNA AMP PROB: CPT

## 2023-06-20 PROCEDURE — 86480 TB TEST CELL IMMUN MEASURE: CPT

## 2023-06-20 PROCEDURE — 80061 LIPID PANEL: CPT

## 2023-06-20 PROCEDURE — 86803 HEPATITIS C AB TEST: CPT

## 2023-06-20 PROCEDURE — 86361 T CELL ABSOLUTE COUNT: CPT

## 2023-06-20 PROCEDURE — 86780 TREPONEMA PALLIDUM: CPT

## 2023-06-20 PROCEDURE — 84153 ASSAY OF PSA TOTAL: CPT

## 2023-06-20 PROCEDURE — 81001 URINALYSIS AUTO W/SCOPE: CPT

## 2023-06-20 PROCEDURE — 83036 HEMOGLOBIN GLYCOSYLATED A1C: CPT

## 2023-06-20 PROCEDURE — 84403 ASSAY OF TOTAL TESTOSTERONE: CPT

## 2023-06-20 PROCEDURE — 85025 COMPLETE CBC W/AUTO DIFF WBC: CPT

## 2023-06-20 PROCEDURE — 82306 VITAMIN D 25 HYDROXY: CPT

## 2023-06-22 LAB
AGE: >18
CD3+CD4+ CELLS # SPEC: 832 UNIT/L (ref 589–1505)
CD3+CD4+ CELLS NFR BLD: 34.49 %
GAMMA INTERFERON BACKGROUND BLD IA-ACNC: 0.06 IU/ML
HIV1 RNA # PLAS NAA DL=20: <20 COPIES/ML
LYMPHOCYTES # BLD AUTO: 2412.3 X10(3)/MCL (ref 1260–5520)
LYMPHOCYTES NFR LN MANUAL: 47.3 % (ref 28–48)
LYMPHOMA - T-CELL MARKERS SPEC-IMP: NORMAL
M TB IFN-G BLD-IMP: NEGATIVE
M TB IFN-G CD4+ BCKGRND COR BLD-ACNC: 0.03 IU/ML
M TB IFN-G CD4+CD8+ BCKGRND COR BLD-ACNC: 0.01 IU/ML
MITOGEN IGNF BCKGRD COR BLD-ACNC: 9.89 IU/ML
WBC # BLD AUTO: 5100 /MM3 (ref 4500–11500)

## 2023-06-28 ENCOUNTER — OFFICE VISIT (OUTPATIENT)
Dept: INFECTIOUS DISEASES | Facility: CLINIC | Age: 63
End: 2023-06-28
Payer: COMMERCIAL

## 2023-06-28 VITALS
HEART RATE: 54 BPM | WEIGHT: 137.88 LBS | RESPIRATION RATE: 18 BRPM | TEMPERATURE: 98 F | HEIGHT: 69 IN | BODY MASS INDEX: 20.42 KG/M2 | DIASTOLIC BLOOD PRESSURE: 71 MMHG | SYSTOLIC BLOOD PRESSURE: 147 MMHG

## 2023-06-28 DIAGNOSIS — B20 HIV DISEASE: Primary | ICD-10-CM

## 2023-06-28 DIAGNOSIS — B00.9 HSV (HERPES SIMPLEX VIRUS) INFECTION: ICD-10-CM

## 2023-06-28 PROCEDURE — 99214 OFFICE O/P EST MOD 30 MIN: CPT | Mod: PBBFAC | Performed by: NURSE PRACTITIONER

## 2023-06-28 PROCEDURE — 99214 PR OFFICE/OUTPT VISIT, EST, LEVL IV, 30-39 MIN: ICD-10-PCS | Mod: S$PBB,,, | Performed by: NURSE PRACTITIONER

## 2023-06-28 PROCEDURE — 99214 OFFICE O/P EST MOD 30 MIN: CPT | Mod: S$PBB,,, | Performed by: NURSE PRACTITIONER

## 2023-06-28 RX ORDER — BICTEGRAVIR SODIUM, EMTRICITABINE, AND TENOFOVIR ALAFENAMIDE FUMARATE 50; 200; 25 MG/1; MG/1; MG/1
1 TABLET ORAL DAILY
Qty: 30 TABLET | Refills: 2 | Status: SHIPPED | OUTPATIENT
Start: 2023-06-28 | End: 2023-08-28 | Stop reason: SDUPTHER

## 2023-06-28 RX ORDER — ACYCLOVIR 400 MG/1
400 TABLET ORAL 2 TIMES DAILY
Qty: 180 TABLET | Refills: 1 | Status: SHIPPED | OUTPATIENT
Start: 2023-06-28 | End: 2024-01-17 | Stop reason: SDUPTHER

## 2023-06-28 NOTE — PROGRESS NOTES
Subjective     Patient ID: Reyes Ren is a 62 y.o. male.    Chief Complaint: Erythema and Followup HIV (Denies problems)    6/28/23  Erik is a 61 yo WM presenting today for HIV f/u visit.  He is virally suppressed on Combivir & Viracept daily, and tolerates it well.  Labs 6/20/23 VL <20, CD4 832.  STI screenings negative, not sexually active.  Vitamin D level normalized at 39.2, not currently taking supplement. HSV suppressed with acyclovir.  Mild anemia with combivir, stable.  However, he tells me that he is starting to have some delays with medication refills due to decreasing availability.  He tells me that he is now ready to consider ART revision.  Will switch to Biktarvy 1 tablet daily.  Voiced understanding & appreciation.  All questions answered & concerns addressed.    12/27/22  Erik is a 60 yo WM here today for HIV f/u visit.  He takes Combivir & Viracept daily, tolerates well with viral suppression.  Labs 12/19/22 VL UD, CD4 759.  Vitamin D level is up at 76.8, Calcium level 10.4.  He tells me that he has been taking vitamin d3 2000 units BID.  Will decrease to once daily dosing as recommended on vitamin d supplement.  He declines vaccinations for flu & shingles today as recommended.  HSV remains suppressed on acyclovir BID.  All questions answered & concerns addressed.      6/27/22  Erik is a 60 yo WM presenting today for HIV f/u visit.  He reports 100% adherent to Combivir & Viracept.  He is virally suppressed & labs are stable.  Labs collected 6/22/22 VL not collected, CD4 875.  Will draw today for VL.  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  He is still not interested in ART revision, but will explore fillers for cosmetic purposes.  He is taking acyclovir daily for HSV suppression, no outbreaks. Remains in care with PCP Dr. Perez.  All questions answered & concerns addressed.        Review of Systems   Constitutional: Negative.     HENT: Negative.     Respiratory: Negative.     Cardiovascular: Negative.    Gastrointestinal: Negative.    Genitourinary: Negative.    Integumentary:  Negative.   Neurological: Negative.    Hematological: Negative.    Psychiatric/Behavioral: Negative.          Objective     Physical Exam  Vitals reviewed.   Constitutional:       General: He is not in acute distress.     Appearance: Normal appearance. He is not toxic-appearing.   HENT:      Mouth/Throat:      Mouth: Mucous membranes are moist.      Pharynx: Oropharynx is clear.   Eyes:      Conjunctiva/sclera: Conjunctivae normal.   Cardiovascular:      Rate and Rhythm: Normal rate and regular rhythm.   Pulmonary:      Effort: Pulmonary effort is normal. No respiratory distress.      Breath sounds: Normal breath sounds.   Abdominal:      General: Abdomen is flat. Bowel sounds are normal.      Palpations: Abdomen is soft.   Musculoskeletal:         General: Normal range of motion.      Cervical back: Normal range of motion.   Lymphadenopathy:      Cervical: No cervical adenopathy.   Skin:     General: Skin is warm and dry.   Neurological:      General: No focal deficit present.      Mental Status: He is alert and oriented to person, place, and time. Mental status is at baseline.   Psychiatric:         Mood and Affect: Mood normal.         Behavior: Behavior normal.          Assessment and Plan     1. HIV disease  -     CD4 Lymphocytes; Future; Expected date: 08/09/2023  -     CBC Auto Differential; Future; Expected date: 08/09/2023  -     Comprehensive Metabolic Panel; Future; Expected date: 08/09/2023  -     HIV-1 RNA, Quantitative, PCR with Reflex to Genotype; Future; Expected date: 08/09/2023  -     ftponsana-taaekupg-bhikpdz ala (BIKTARVY) -25 mg (25 kg or greater); Take 1 tablet by mouth once daily.  Dispense: 30 tablet; Refill: 2  Adherence/sexual health counseling done.  Discontinue Combivir/Viracept.  Replace with Biktarvy 1 tablet daily.   Repeat labs  1 wk prior to next visit.  RTC 2 months with Corrine for re-evaluation.  Keep f/u with PCP as scheduled.    2. HSV (herpes simplex virus) infection  -     acyclovir (ZOVIRAX) 400 MG tablet; Take 1 tablet (400 mg total) by mouth 2 (two) times daily.  Dispense: 180 tablet; Refill: 1  Suppressed.  Continue acyclovir for suppression.

## 2023-07-06 DIAGNOSIS — M62.838 MUSCLE SPASM: ICD-10-CM

## 2023-07-06 RX ORDER — CARISOPRODOL 350 MG/1
TABLET ORAL
Qty: 60 TABLET | Refills: 0 | Status: SHIPPED | OUTPATIENT
Start: 2023-07-06 | End: 2023-08-03 | Stop reason: SDUPTHER

## 2023-07-12 RX ORDER — ALPRAZOLAM 1 MG/1
TABLET ORAL
Qty: 60 TABLET | Refills: 5 | Status: SHIPPED | OUTPATIENT
Start: 2023-07-12 | End: 2023-12-18

## 2023-07-12 RX ORDER — ALPRAZOLAM 1 MG/1
1 TABLET ORAL 2 TIMES DAILY
Qty: 60 TABLET | Refills: 5 | OUTPATIENT
Start: 2023-07-12

## 2023-08-03 DIAGNOSIS — M62.838 MUSCLE SPASM: ICD-10-CM

## 2023-08-03 RX ORDER — CARISOPRODOL 350 MG/1
TABLET ORAL
Qty: 60 TABLET | Refills: 0 | Status: SHIPPED | OUTPATIENT
Start: 2023-08-03 | End: 2023-08-29

## 2023-08-14 PROBLEM — Z00.00 WELLNESS EXAMINATION: Status: RESOLVED | Noted: 2023-05-10 | Resolved: 2023-08-14

## 2023-08-22 ENCOUNTER — LAB VISIT (OUTPATIENT)
Dept: LAB | Facility: HOSPITAL | Age: 63
End: 2023-08-22
Attending: NURSE PRACTITIONER
Payer: COMMERCIAL

## 2023-08-22 DIAGNOSIS — B20 HIV DISEASE: ICD-10-CM

## 2023-08-22 LAB
ALBUMIN SERPL-MCNC: 4.1 G/DL (ref 3.4–4.8)
ALBUMIN/GLOB SERPL: 1.3 RATIO (ref 1.1–2)
ALP SERPL-CCNC: 75 UNIT/L (ref 40–150)
ALT SERPL-CCNC: 20 UNIT/L (ref 0–55)
AST SERPL-CCNC: 13 UNIT/L (ref 5–34)
BASOPHILS # BLD AUTO: 0.02 X10(3)/MCL
BASOPHILS NFR BLD AUTO: 0.3 %
BILIRUB SERPL-MCNC: 0.7 MG/DL
BUN SERPL-MCNC: 14.5 MG/DL (ref 8.4–25.7)
CALCIUM SERPL-MCNC: 9.9 MG/DL (ref 8.8–10)
CHLORIDE SERPL-SCNC: 106 MMOL/L (ref 98–107)
CO2 SERPL-SCNC: 25 MMOL/L (ref 23–31)
CREAT SERPL-MCNC: 1.1 MG/DL (ref 0.73–1.18)
EOSINOPHIL # BLD AUTO: 0.18 X10(3)/MCL (ref 0–0.9)
EOSINOPHIL NFR BLD AUTO: 2.8 %
ERYTHROCYTE [DISTWIDTH] IN BLOOD BY AUTOMATED COUNT: 11.8 % (ref 11.5–17)
GFR SERPLBLD CREATININE-BSD FMLA CKD-EPI: >60 MLS/MIN/1.73/M2
GLOBULIN SER-MCNC: 3.1 GM/DL (ref 2.4–3.5)
GLUCOSE SERPL-MCNC: 99 MG/DL (ref 82–115)
HCT VFR BLD AUTO: 46.3 % (ref 42–52)
HGB BLD-MCNC: 15.9 G/DL (ref 14–18)
IMM GRANULOCYTES # BLD AUTO: 0.02 X10(3)/MCL (ref 0–0.04)
IMM GRANULOCYTES NFR BLD AUTO: 0.3 %
LYMPHOCYTES # BLD AUTO: 2.79 X10(3)/MCL (ref 0.6–4.6)
LYMPHOCYTES NFR BLD AUTO: 43.6 %
MCH RBC QN AUTO: 36.4 PG (ref 27–31)
MCHC RBC AUTO-ENTMCNC: 34.3 G/DL (ref 33–36)
MCV RBC AUTO: 105.9 FL (ref 80–94)
MONOCYTES # BLD AUTO: 0.64 X10(3)/MCL (ref 0.1–1.3)
MONOCYTES NFR BLD AUTO: 10 %
NEUTROPHILS # BLD AUTO: 2.75 X10(3)/MCL (ref 2.1–9.2)
NEUTROPHILS NFR BLD AUTO: 43 %
NRBC BLD AUTO-RTO: 0 %
PLATELET # BLD AUTO: 204 X10(3)/MCL (ref 130–400)
PMV BLD AUTO: 9.4 FL (ref 7.4–10.4)
POTASSIUM SERPL-SCNC: 4 MMOL/L (ref 3.5–5.1)
PROT SERPL-MCNC: 7.2 GM/DL (ref 5.8–7.6)
RBC # BLD AUTO: 4.37 X10(6)/MCL (ref 4.7–6.1)
SODIUM SERPL-SCNC: 139 MMOL/L (ref 136–145)
WBC # SPEC AUTO: 6.4 X10(3)/MCL (ref 4.5–11.5)

## 2023-08-22 PROCEDURE — 36415 COLL VENOUS BLD VENIPUNCTURE: CPT

## 2023-08-22 PROCEDURE — 80053 COMPREHEN METABOLIC PANEL: CPT

## 2023-08-22 PROCEDURE — 87536 HIV-1 QUANT&REVRSE TRNSCRPJ: CPT

## 2023-08-22 PROCEDURE — 86360 T CELL ABSOLUTE COUNT/RATIO: CPT

## 2023-08-22 PROCEDURE — 85025 COMPLETE CBC W/AUTO DIFF WBC: CPT

## 2023-08-23 LAB
CD3 CELLS # BLD: 2414 CELLS/MCL (ref 550–2202)
CD3 CELLS NFR BLD: 86 % (ref 58–86)
CD3+CD4+ CELLS # BLD: 959 CELLS/MCL (ref 365–1437)
CD3+CD4+ CELLS NFR BLD: 34 % (ref 32–64)
CD3+CD4+ CELLS/CD3+CD8+ CLL BLD: 0.7 %
CD3+CD8+ CELLS # BLD: 1416 CELLS/MCL (ref 80–846)
CD3+CD8+ CELLS NFR BLD: 50 % (ref 8–40)
CD45 CELLS # BLD: 2.81 THOU/MCL (ref 0.82–2.84)
HIV1 RNA # PLAS NAA DL=20: NORMAL COPIES/ML

## 2023-08-28 ENCOUNTER — OFFICE VISIT (OUTPATIENT)
Dept: INFECTIOUS DISEASES | Facility: CLINIC | Age: 63
End: 2023-08-28
Payer: COMMERCIAL

## 2023-08-28 VITALS
DIASTOLIC BLOOD PRESSURE: 71 MMHG | HEART RATE: 54 BPM | BODY MASS INDEX: 19.8 KG/M2 | HEIGHT: 69 IN | WEIGHT: 133.69 LBS | RESPIRATION RATE: 16 BRPM | SYSTOLIC BLOOD PRESSURE: 143 MMHG | TEMPERATURE: 98 F

## 2023-08-28 DIAGNOSIS — B20 HIV DISEASE: Primary | ICD-10-CM

## 2023-08-28 DIAGNOSIS — M62.838 MUSCLE SPASM: ICD-10-CM

## 2023-08-28 PROCEDURE — 99214 OFFICE O/P EST MOD 30 MIN: CPT | Mod: S$PBB,,, | Performed by: NURSE PRACTITIONER

## 2023-08-28 PROCEDURE — 99214 PR OFFICE/OUTPT VISIT, EST, LEVL IV, 30-39 MIN: ICD-10-PCS | Mod: S$PBB,,, | Performed by: NURSE PRACTITIONER

## 2023-08-28 PROCEDURE — 99215 OFFICE O/P EST HI 40 MIN: CPT | Mod: PBBFAC | Performed by: NURSE PRACTITIONER

## 2023-08-28 RX ORDER — BICTEGRAVIR SODIUM, EMTRICITABINE, AND TENOFOVIR ALAFENAMIDE FUMARATE 50; 200; 25 MG/1; MG/1; MG/1
1 TABLET ORAL DAILY
Qty: 30 TABLET | Refills: 2 | Status: SHIPPED | OUTPATIENT
Start: 2023-08-28 | End: 2023-12-18 | Stop reason: SDUPTHER

## 2023-08-28 NOTE — PROGRESS NOTES
Patient ID: Reyes Ren 62 y.o.     Chief Complaint:   Chief Complaint   Patient presents with    Followup HIV     States Nausea and HA's with Biktarvy and some constipation        HPI:  8/28/23  Erik is a 61 yo WM here today for HIV f/u visit.  He started Biktarvy x 2 months now. Labs 8/22/23 VL UD, CD4 959.  Renal function, LFTs, and CBC stable.  He tells me that he has noted some nausea in the mornings with occasional vomiting. He recently started taking it with a peanut butter cracker and this seems to help.  He also has some constipation with the change in regimen. Will notify me if it persists. Overall, doing well with Biktarvy.  He persists with drooping eyelids that are effecting his vision.  Will call Dr. Toussaint's office for evaluation & treatment. All questions answered & concerns addressed.      6/28/23  Erik is a 61 yo WM presenting today for HIV f/u visit.  He is virally suppressed on Combivir & Viracept daily, and tolerates it well.  Labs 6/20/23 VL <20, CD4 832.  STI screenings negative, not sexually active.  Vitamin D level normalized at 39.2, not currently taking supplement. HSV suppressed with acyclovir.  Mild anemia with combivir, stable.  However, he tells me that he is starting to have some delays with medication refills due to decreasing availability.  He tells me that he is now ready to consider ART revision.  Will switch to Biktarvy 1 tablet daily.  Voiced understanding & appreciation.  All questions answered & concerns addressed.     12/27/22  Erik is a 60 yo WM here today for HIV f/u visit.  He takes Combivir & Viracept daily, tolerates well with viral suppression.  Labs 12/19/22 VL UD, CD4 759.  Vitamin D level is up at 76.8, Calcium level 10.4.  He tells me that he has been taking vitamin d3 2000 units BID.  Will decrease to once daily dosing as recommended on vitamin d supplement.  He declines vaccinations for flu & shingles today as recommended.  HSV remains suppressed on acyclovir  BID.  All questions answered & concerns addressed.       Past Medical History:   Diagnosis Date    Anxiety     Coronary artery disease     GERD (gastroesophageal reflux disease)     HIV infection     Hyperlipidemia     Hypertension         Past Surgical History:   Procedure Laterality Date    CARDIAC CATHETERIZATION      INSERTION OF STENT INTO PERIPHERAL VESSEL Right     INSERTION OF STENT INTO PERIPHERAL VESSEL Left 01/01/2007        Social History     Socioeconomic History    Marital status: Single   Tobacco Use    Smoking status: Former    Smokeless tobacco: Former   Substance and Sexual Activity    Alcohol use: Yes     Alcohol/week: 10.0 standard drinks of alcohol     Types: 10 Cans of beer per week     Comment: occassionally    Drug use: Not Currently    Sexual activity: Not Currently     Birth control/protection: None        Family History   Problem Relation Age of Onset    Heart disease Mother     Peripheral vascular disease Mother     Parkinsonism Father     Lung disease Sister     Heart disease Brother     Mental illness Brother         Review of patient's allergies indicates:   Allergen Reactions    Pitavastatin      Other reaction(s): Bone pain    Sulfamethoxazole-trimethoprim Rash        Immunization History   Administered Date(s) Administered    COVID-19, MRNA, LN-S, PF (Pfizer) (Purple Cap) 08/24/2021, 09/14/2021    Hepatitis A, Adult 05/16/2016, 02/20/2017    Meningococcal Conjugate 08/30/2018    Meningococcal Conjugate (MCV4P) 08/30/2018, 12/20/2018    Pneumococcal Conjugate - 13 Valent 10/22/2015    Pneumococcal Conjugate - 7 Valent 10/08/2008    Pneumococcal Polysaccharide - 23 Valent 09/19/2013, 03/01/2018    Td (ADULT) 01/02/2009, 05/10/2023    Td - PF (ADULT) 01/02/2009        Review of Systems   Constitutional: Negative.    HENT: Negative.     Eyes: Negative.    Respiratory: Negative.     Cardiovascular: Negative.    Gastrointestinal:  Positive for constipation, nausea and vomiting.  "  Genitourinary: Negative.    Musculoskeletal: Negative.    Skin: Negative.    Neurological: Negative.    Endo/Heme/Allergies: Negative.    Psychiatric/Behavioral: Negative.     All other systems reviewed and are negative.         Objective:      BP (!) 143/71 (BP Location: Left arm, Patient Position: Sitting, BP Method: Medium (Automatic))   Pulse (!) 54   Temp 97.7 °F (36.5 °C) (Oral)   Resp 16   Ht 5' 9" (1.753 m)   Wt 60.6 kg (133 lb 11.2 oz)   BMI 19.74 kg/m²      Physical Exam  Vitals reviewed.   Constitutional:       General: He is not in acute distress.     Appearance: Normal appearance. He is not toxic-appearing.   HENT:      Mouth/Throat:      Mouth: Mucous membranes are moist.      Pharynx: Oropharynx is clear.   Eyes:      Conjunctiva/sclera: Conjunctivae normal.   Cardiovascular:      Rate and Rhythm: Normal rate and regular rhythm.   Pulmonary:      Effort: Pulmonary effort is normal. No respiratory distress.      Breath sounds: Normal breath sounds.   Abdominal:      General: Abdomen is flat. Bowel sounds are normal.      Palpations: Abdomen is soft.   Musculoskeletal:         General: Normal range of motion.      Cervical back: Normal range of motion.   Lymphadenopathy:      Cervical: No cervical adenopathy.   Skin:     General: Skin is warm and dry.   Neurological:      General: No focal deficit present.      Mental Status: He is alert and oriented to person, place, and time. Mental status is at baseline.   Psychiatric:         Mood and Affect: Mood normal.         Behavior: Behavior normal.          Labs: Reviewed most recent relevant labs available, notable results highlighted in this note    Imaging: Reviewed most recent relevant imaging studies available, notable results highlighted in this note      Medications:     Current Outpatient Medications   Medication Instructions    acyclovir (ZOVIRAX) 400 mg, Oral, 2 times daily    albuterol (PROVENTIL HFA) 90 mcg/actuation inhaler 2 puffs, " Inhalation, Every 6 hours PRN, Rescue    ALPRAZolam (XANAX) 1 MG tablet TAKE ONE TABLET BY MOUTH TWICE DAILY AS NEEDED FOR ANXIETY    amLODIPine (NORVASC) 2.5 mg, Oral, Daily    aspirin (ECOTRIN) 81 mg, Oral    cogdogudx-ifyohnca-fsvlpeo ala (BIKTARVY) -25 mg (25 kg or greater) 1 tablet, Oral, Daily    carisoprodoL (SOMA) 350 MG tablet TAKE 1 TABLET BY MOUTH 2 TIMES A DAY AS NEEDED FOR MUSCLE SPASMS    clopidogreL (PLAVIX) 75 mg, Oral, Daily    desvenlafaxine 100 mg Tb24 1 tablet, Oral, Daily    esomeprazole (NEXIUM) 40 MG capsule TAKE 1 CAPSULE BY MOUTH ONCE DAILY    famotidine (PEPCID) 40 MG tablet TAKE 1 TABLET BY MOUTH AT BEDTIME    fluticasone propionate (FLONASE) 50 mcg/actuation nasal spray USE TWO SPRAYS IN EACH NOSTRIL ONCE DAILY    losartan (COZAAR) 100 MG tablet TAKE 1 TABLET BY MOUTH ONCE DAILY    rosuvastatin (CRESTOR) 10 mg, Oral, Nightly       Assessment:       Problem List Items Addressed This Visit    None  Visit Diagnoses       HIV disease    -  Primary    Relevant Medications    pwzhngekn-otzkomzo-foedpex ala (BIKTARVY) -25 mg (25 kg or greater)    Other Relevant Orders    Comprehensive Metabolic Panel    CD4 Lymphocytes    CBC Auto Differential    HIV-1 RNA, Quantitative, PCR with Reflex to Genotype               Plan:      HIV disease  -     epkdidkqf-eoynljfr-gobawzy ala (BIKTARVY) -25 mg (25 kg or greater); Take 1 tablet by mouth once daily.  Dispense: 30 tablet; Refill: 2  -     Comprehensive Metabolic Panel; Future; Expected date: 02/01/2024  -     CD4 Lymphocytes; Future; Expected date: 02/01/2024  -     CBC Auto Differential; Future; Expected date: 02/01/2024  -     HIV-1 RNA, Quantitative, PCR with Reflex to Genotype; Future; Expected date: 02/01/2024  Adherence/sexual health counseling done.  Continue Biktarvy 1 tablet daily.   Notify me if nausea, vomiting, constipation persist.  Repeat labs 1 wk prior to next visit.  RTC 6 months with Corrine.  Keep f/u with PCP as  scheduled.    2. HSV (herpes simplex virus) infection  Suppressed.  Continue acyclovir for suppression.

## 2023-08-29 RX ORDER — CARISOPRODOL 350 MG/1
TABLET ORAL
Qty: 60 TABLET | Refills: 0 | Status: SHIPPED | OUTPATIENT
Start: 2023-08-29 | End: 2023-09-21

## 2023-08-30 DIAGNOSIS — I10 PRIMARY HYPERTENSION: Primary | ICD-10-CM

## 2023-08-30 RX ORDER — AMLODIPINE BESYLATE 2.5 MG/1
2.5 TABLET ORAL
Qty: 30 TABLET | Refills: 11 | Status: SHIPPED | OUTPATIENT
Start: 2023-08-30

## 2023-09-07 ENCOUNTER — PATIENT MESSAGE (OUTPATIENT)
Dept: RESEARCH | Facility: HOSPITAL | Age: 63
End: 2023-09-07
Payer: COMMERCIAL

## 2023-09-20 DIAGNOSIS — M62.838 MUSCLE SPASM: ICD-10-CM

## 2023-09-21 RX ORDER — CARISOPRODOL 350 MG/1
TABLET ORAL
Qty: 60 TABLET | Refills: 0 | Status: SHIPPED | OUTPATIENT
Start: 2023-09-21 | End: 2023-10-27

## 2023-10-27 DIAGNOSIS — M62.838 MUSCLE SPASM: ICD-10-CM

## 2023-10-27 RX ORDER — CARISOPRODOL 350 MG/1
TABLET ORAL
Qty: 60 TABLET | Refills: 0 | Status: SHIPPED | OUTPATIENT
Start: 2023-10-27 | End: 2023-11-27

## 2023-10-27 NOTE — TELEPHONE ENCOUNTER
----- Message from Carlie Monsivais sent at 10/27/2023  8:52 AM CDT -----  .Type:  RX Refill Request    Who Called: PT  Refill or New Rx:REFILL  RX Name and Strength:carisoprodoL (SOMA) 350 MG tablet  How is the patient currently taking it? (ex. 1XDay):2X DAY  Is this a 30 day or 90 day RX:60  Preferred Pharmacy with phone number:[facility]  Local or Mail Order:local  Ordering Provider david  Would the patient rath a call back or a response via [facility]?   Best Call Back Number:  Additional Information: need refill

## 2023-10-29 DIAGNOSIS — R06.2 WHEEZING: Primary | ICD-10-CM

## 2023-10-30 RX ORDER — ALBUTEROL SULFATE 90 UG/1
AEROSOL, METERED RESPIRATORY (INHALATION)
Qty: 18 G | Refills: 6 | Status: SHIPPED | OUTPATIENT
Start: 2023-10-30

## 2023-11-10 ENCOUNTER — TELEPHONE (OUTPATIENT)
Dept: INTERNAL MEDICINE | Facility: CLINIC | Age: 63
End: 2023-11-10
Payer: COMMERCIAL

## 2023-11-10 NOTE — TELEPHONE ENCOUNTER
----- Message from April Stewart sent at 11/10/2023 11:47 AM CST -----  Regarding: med advice  .Type:  Needs Medical Advice    Who Called:  patient  Symptoms (please be specific):    How long has patient had these symptoms:    Pharmacy name and phone #:    Would the patient rather a call back or a response via [facility]? Call back  Best Call Back Number:  [phone]  Additional Information:  patient would like clarity on whether labs were required for his upcoming 6 month f/u. Please advise.

## 2023-11-13 DIAGNOSIS — E78.5 HYPERLIPIDEMIA, UNSPECIFIED HYPERLIPIDEMIA TYPE: Primary | ICD-10-CM

## 2023-11-13 DIAGNOSIS — E29.1 HYPOGONADISM IN MALE: ICD-10-CM

## 2023-11-13 NOTE — TELEPHONE ENCOUNTER
Pt notified and verbalized understanding. He also req testosterone to be drawn as well. Ok'd per Dr. Perez. Lipid panel and testosterone ordered. Has to be drawn at 7am. Pt notified and verbalized understanding.

## 2023-11-15 ENCOUNTER — TELEPHONE (OUTPATIENT)
Dept: INTERNAL MEDICINE | Facility: CLINIC | Age: 63
End: 2023-11-15
Payer: COMMERCIAL

## 2023-11-15 NOTE — TELEPHONE ENCOUNTER
----- Message from Maria L Ventura LPN sent at 11/15/2023  2:00 PM CST -----  Regarding: NAYELI Perez 11/22/23 @0830  Are there any outstanding tasks in the chart? Pt will need fasting labs    Is there any documentation of tasks? no    Has patient seen another physician, been to the ER, C, or admitted to hospital since last visit?    Has the patient done blood work or imaging since last visit?

## 2023-11-16 ENCOUNTER — LAB VISIT (OUTPATIENT)
Dept: LAB | Facility: HOSPITAL | Age: 63
End: 2023-11-16
Attending: INTERNAL MEDICINE
Payer: COMMERCIAL

## 2023-11-16 DIAGNOSIS — E78.5 HYPERLIPIDEMIA, UNSPECIFIED HYPERLIPIDEMIA TYPE: ICD-10-CM

## 2023-11-16 DIAGNOSIS — E29.1 HYPOGONADISM IN MALE: ICD-10-CM

## 2023-11-16 LAB
CHOLEST SERPL-MCNC: 162 MG/DL
CHOLEST/HDLC SERPL: 4 {RATIO} (ref 0–5)
HDLC SERPL-MCNC: 44 MG/DL (ref 35–60)
LDLC SERPL CALC-MCNC: 90 MG/DL (ref 50–140)
TESTOST SERPL-MCNC: 329.68 NG/DL (ref 220.91–715.81)
TRIGL SERPL-MCNC: 139 MG/DL (ref 34–140)
VLDLC SERPL CALC-MCNC: 28 MG/DL

## 2023-11-16 PROCEDURE — 36415 COLL VENOUS BLD VENIPUNCTURE: CPT

## 2023-11-16 PROCEDURE — 80061 LIPID PANEL: CPT

## 2023-11-16 PROCEDURE — 84403 ASSAY OF TOTAL TESTOSTERONE: CPT

## 2023-11-22 ENCOUNTER — OFFICE VISIT (OUTPATIENT)
Dept: INTERNAL MEDICINE | Facility: CLINIC | Age: 63
End: 2023-11-22
Payer: COMMERCIAL

## 2023-11-22 VITALS
SYSTOLIC BLOOD PRESSURE: 136 MMHG | HEIGHT: 69 IN | DIASTOLIC BLOOD PRESSURE: 64 MMHG | HEART RATE: 56 BPM | RESPIRATION RATE: 18 BRPM | WEIGHT: 134 LBS | OXYGEN SATURATION: 95 % | BODY MASS INDEX: 19.85 KG/M2

## 2023-11-22 DIAGNOSIS — E29.1 HYPOGONADISM IN MALE: ICD-10-CM

## 2023-11-22 DIAGNOSIS — I10 PRIMARY HYPERTENSION: Primary | ICD-10-CM

## 2023-11-22 DIAGNOSIS — I77.9 PERIPHERAL ARTERIAL OCCLUSIVE DISEASE: ICD-10-CM

## 2023-11-22 DIAGNOSIS — E78.5 HYPERLIPIDEMIA, UNSPECIFIED HYPERLIPIDEMIA TYPE: ICD-10-CM

## 2023-11-22 DIAGNOSIS — F41.8 MIXED ANXIETY DEPRESSIVE DISORDER: ICD-10-CM

## 2023-11-22 PROBLEM — I70.213 ATHEROSCLEROSIS OF NATIVE ARTERY OF BOTH LOWER EXTREMITIES WITH INTERMITTENT CLAUDICATION: Status: RESOLVED | Noted: 2020-07-07 | Resolved: 2023-11-22

## 2023-11-22 PROCEDURE — 3008F BODY MASS INDEX DOCD: CPT | Mod: CPTII,,, | Performed by: INTERNAL MEDICINE

## 2023-11-22 PROCEDURE — 4010F PR ACE/ARB THEARPY RXD/TAKEN: ICD-10-PCS | Mod: CPTII,,, | Performed by: INTERNAL MEDICINE

## 2023-11-22 PROCEDURE — 1159F MED LIST DOCD IN RCRD: CPT | Mod: CPTII,,, | Performed by: INTERNAL MEDICINE

## 2023-11-22 PROCEDURE — 99214 OFFICE O/P EST MOD 30 MIN: CPT | Mod: ,,, | Performed by: INTERNAL MEDICINE

## 2023-11-22 PROCEDURE — 3008F PR BODY MASS INDEX (BMI) DOCUMENTED: ICD-10-PCS | Mod: CPTII,,, | Performed by: INTERNAL MEDICINE

## 2023-11-22 PROCEDURE — 3075F PR MOST RECENT SYSTOLIC BLOOD PRESS GE 130-139MM HG: ICD-10-PCS | Mod: CPTII,,, | Performed by: INTERNAL MEDICINE

## 2023-11-22 PROCEDURE — 3075F SYST BP GE 130 - 139MM HG: CPT | Mod: CPTII,,, | Performed by: INTERNAL MEDICINE

## 2023-11-22 PROCEDURE — 1160F RVW MEDS BY RX/DR IN RCRD: CPT | Mod: CPTII,,, | Performed by: INTERNAL MEDICINE

## 2023-11-22 PROCEDURE — 1160F PR REVIEW ALL MEDS BY PRESCRIBER/CLIN PHARMACIST DOCUMENTED: ICD-10-PCS | Mod: CPTII,,, | Performed by: INTERNAL MEDICINE

## 2023-11-22 PROCEDURE — 99214 PR OFFICE/OUTPT VISIT, EST, LEVL IV, 30-39 MIN: ICD-10-PCS | Mod: ,,, | Performed by: INTERNAL MEDICINE

## 2023-11-22 PROCEDURE — 3044F HG A1C LEVEL LT 7.0%: CPT | Mod: CPTII,,, | Performed by: INTERNAL MEDICINE

## 2023-11-22 PROCEDURE — 3078F PR MOST RECENT DIASTOLIC BLOOD PRESSURE < 80 MM HG: ICD-10-PCS | Mod: CPTII,,, | Performed by: INTERNAL MEDICINE

## 2023-11-22 PROCEDURE — 4010F ACE/ARB THERAPY RXD/TAKEN: CPT | Mod: CPTII,,, | Performed by: INTERNAL MEDICINE

## 2023-11-22 PROCEDURE — 1159F PR MEDICATION LIST DOCUMENTED IN MEDICAL RECORD: ICD-10-PCS | Mod: CPTII,,, | Performed by: INTERNAL MEDICINE

## 2023-11-22 PROCEDURE — 3078F DIAST BP <80 MM HG: CPT | Mod: CPTII,,, | Performed by: INTERNAL MEDICINE

## 2023-11-22 PROCEDURE — 3044F PR MOST RECENT HEMOGLOBIN A1C LEVEL <7.0%: ICD-10-PCS | Mod: CPTII,,, | Performed by: INTERNAL MEDICINE

## 2023-11-22 RX ORDER — ROSUVASTATIN CALCIUM 20 MG/1
20 TABLET, COATED ORAL NIGHTLY
Qty: 90 TABLET | Refills: 3 | Status: SHIPPED | OUTPATIENT
Start: 2023-11-22

## 2023-11-22 NOTE — PROGRESS NOTES
Subjective:      Chief Complaint: Follow-up (6mo/Discuss labs )      HPI:He is here for f/u chronic pain, claudication/pad, htn, anxiety and depression.  He reports that he is very active.  He does check his BP at home, and its been good.  His HIV meds were changed.  He was having some diarrhea and nausea.  But that is doing better.  He had some anxiety about switching his meds.  He had an illness where he was coughing a lot and developed an umbilical hernia.  This was about 3 weeks ago.  He still has the small hernia, but its very small.  Problem Noted   Degeneration of Intervertebral Disc of Lumbar Region 10/21/2022   Gastroesophageal Reflux Disease 10/21/2022   Hyperlipidemia 10/21/2022   Hypertension 10/21/2022   Hypogonadism in Male 10/21/2022   Mixed Anxiety Depressive Disorder 10/21/2022   Neuropathy 10/21/2022   Panic Attack 10/21/2022   Peripheral Arterial Occlusive Disease 10/21/2022    s/p stents/angioplasty right lower ext.had blockage in his left calf that was stented and then re-occluded.  Then received another opinion from Dr. Ramirez who rec exercise and conditioning and that has helped alot.  He has to walk a pretty long distance before it starts to cramp.     Sciatica 10/21/2022   Chronic Pain 10/21/2022   HIV Infection 6/27/2022    has been undetectable for 13-14 years     Wellness Examination (Resolved) 5/10/2023   Atherosclerosis of Native Artery of Both Lower Extremities With Intermittent Claudication (Resolved) 7/7/2020        The patient's Health Maintenance was reviewed and the following appears to be due:   Health Maintenance Due   Topic Date Due    Shingles Vaccine (1 of 2) Never done    RSV Vaccine (Age 60+ and Pregnant patients) (1 - 1-dose 60+ series) Never done    COVID-19 Vaccine (3 - 2023-24 season) 09/01/2023       Past Medical History:  Past Medical History:   Diagnosis Date    Anxiety     Atherosclerosis of native artery of both lower extremities with intermittent claudication  07/07/2020    Coronary artery disease     GERD (gastroesophageal reflux disease)     HIV infection     Hyperlipidemia     Hypertension      Review of patient's allergies indicates:   Allergen Reactions    Pitavastatin      Other reaction(s): Bone pain    Sulfamethoxazole-trimethoprim Rash     Current Outpatient Medications on File Prior to Visit   Medication Sig Dispense Refill    acyclovir (ZOVIRAX) 400 MG tablet Take 1 tablet (400 mg total) by mouth 2 (two) times daily. 180 tablet 1    albuterol (PROVENTIL/VENTOLIN HFA) 90 mcg/actuation inhaler INHALE 2 PUFFS BY MOUTH EVERY 6 HOURS AS NEEDED FOR SHORTNESS OF BREATH OR WHEEZING 18 g 6    ALPRAZolam (XANAX) 1 MG tablet TAKE ONE TABLET BY MOUTH TWICE DAILY AS NEEDED FOR ANXIETY 60 tablet 5    amLODIPine (NORVASC) 2.5 MG tablet TAKE ONE TABLET BY MOUTH ONCE DAILY 30 tablet 11    aspirin (ECOTRIN) 81 MG EC tablet Take 81 mg by mouth.      lrhevdzdm-ncppwpgj-apkpfzm ala (BIKTARVY) -25 mg (25 kg or greater) Take 1 tablet by mouth once daily. 30 tablet 2    carisoprodoL (SOMA) 350 MG tablet TAKE ONE TABLET BY MOUTH TWICE DAILY AS NEEDED FOR MUSCLE SPASMS 60 tablet 0    clopidogreL (PLAVIX) 75 mg tablet Take 1 tablet (75 mg total) by mouth once daily. 30 tablet 3    desvenlafaxine 100 mg Tb24 Take 1 tablet by mouth once daily.      esomeprazole (NEXIUM) 40 MG capsule TAKE 1 CAPSULE BY MOUTH ONCE DAILY 30 capsule 11    famotidine (PEPCID) 40 MG tablet TAKE 1 TABLET BY MOUTH AT BEDTIME 30 tablet 11    fluticasone propionate (FLONASE) 50 mcg/actuation nasal spray USE TWO SPRAYS IN EACH NOSTRIL ONCE DAILY 16 g 5    losartan (COZAAR) 100 MG tablet TAKE 1 TABLET BY MOUTH ONCE DAILY 90 tablet 3    [DISCONTINUED] rosuvastatin (CRESTOR) 10 MG tablet Take 1 tablet (10 mg total) by mouth every evening. 90 tablet 3     No current facility-administered medications on file prior to visit.       Review of Systems    Objective:   /64 (BP Location: Left arm, Patient Position:  "Sitting, BP Method: Small (Manual))   Pulse (!) 56   Resp 18   Ht 5' 9.02" (1.753 m)   Wt 60.8 kg (134 lb)   SpO2 95%   BMI 19.78 kg/m²     Physical Exam  Vitals reviewed.   Constitutional:       Appearance: Normal appearance.   HENT:      Head: Normocephalic and atraumatic.   Eyes:      Conjunctiva/sclera: Conjunctivae normal.   Cardiovascular:      Rate and Rhythm: Normal rate and regular rhythm.      Pulses: Normal pulses.      Heart sounds: Normal heart sounds. No murmur heard.     No friction rub. No gallop.   Pulmonary:      Effort: Pulmonary effort is normal. No respiratory distress.      Breath sounds: Normal breath sounds. No wheezing, rhonchi or rales.   Abdominal:      General: Abdomen is flat. There is no distension.      Palpations: Abdomen is soft. There is no mass.      Tenderness: There is no guarding or rebound.   Skin:     General: Skin is warm and dry.   Neurological:      General: No focal deficit present.      Mental Status: He is alert and oriented to person, place, and time.   Psychiatric:         Mood and Affect: Mood normal.         Behavior: Behavior normal.         Thought Content: Thought content normal.         Judgment: Judgment normal.       Assessment and Plan:     Peripheral arterial occlusive disease  He is not having any issues and he will see Dr. Ramirez next week.    Mixed anxiety depressive disorder  Stable, continue pristiq and xanax.    Hypogonadism in male  Testosterone is within range.    Hypertension  Controlled, continue amlodipine and losartan.    Hyperlipidemia  Increase the crestor to 20 mg at bedtime.      Follow up in about 6 months (around 5/22/2024).    Medications Ordered This Encounter   Medications    rosuvastatin (CRESTOR) 20 MG tablet     Sig: Take 1 tablet (20 mg total) by mouth every evening.     Dispense:  90 tablet     Refill:  3     [unfilled]  No orders of the defined types were placed in this encounter.      "

## 2023-11-25 DIAGNOSIS — M62.838 MUSCLE SPASM: ICD-10-CM

## 2023-11-27 ENCOUNTER — TELEPHONE (OUTPATIENT)
Dept: INTERNAL MEDICINE | Facility: CLINIC | Age: 63
End: 2023-11-27
Payer: COMMERCIAL

## 2023-11-27 RX ORDER — CARISOPRODOL 350 MG/1
TABLET ORAL
Qty: 60 TABLET | Refills: 0 | Status: SHIPPED | OUTPATIENT
Start: 2023-11-27 | End: 2023-12-27

## 2023-11-27 NOTE — TELEPHONE ENCOUNTER
----- Message from Wilfredo Castaneda sent at 11/27/2023 10:12 AM CST -----  .Type:  RX Refill Request    Who Called: Reyes  Refill or New Rx: Refill   RX Name and Strength: carisoprodoL (SOMA) 350 MG tablet  How is the patient currently taking it? (ex. 1XDay):  Is this a 30 day or 90 day RX:  Preferred Pharmacy with phone number: [facility]  Local or Mail Order: Local   Ordering Provider: Ana  Would the patient rather a call back or a response via [facility]?   Best Call Back Number: [phone]  Additional Information: Please call with any questions.

## 2023-12-18 ENCOUNTER — TELEPHONE (OUTPATIENT)
Dept: INFECTIOUS DISEASES | Facility: CLINIC | Age: 63
End: 2023-12-18
Payer: COMMERCIAL

## 2023-12-18 DIAGNOSIS — B20 HIV DISEASE: ICD-10-CM

## 2023-12-18 RX ORDER — ALPRAZOLAM 1 MG/1
TABLET ORAL
Qty: 60 TABLET | Refills: 5 | Status: SHIPPED | OUTPATIENT
Start: 2023-12-18

## 2023-12-18 RX ORDER — BICTEGRAVIR SODIUM, EMTRICITABINE, AND TENOFOVIR ALAFENAMIDE FUMARATE 50; 200; 25 MG/1; MG/1; MG/1
1 TABLET ORAL DAILY
Qty: 30 TABLET | Refills: 3 | Status: SHIPPED | OUTPATIENT
Start: 2023-12-18 | End: 2024-02-29 | Stop reason: SDUPTHER

## 2023-12-18 NOTE — TELEPHONE ENCOUNTER
Last visit with Corrine Portillo, APRN: 8/28/2023  Last visit in [facility] INFECTIOUS DISEASE: 8/28/2023    Patient's next visit in [facility] INFECTIOUS DISEASE: 2/29/2024     LL 08/22/2023    Please advise on medication refill

## 2023-12-23 DIAGNOSIS — M62.838 MUSCLE SPASM: ICD-10-CM

## 2023-12-27 RX ORDER — CARISOPRODOL 350 MG/1
TABLET ORAL
Qty: 60 TABLET | Refills: 0 | Status: SHIPPED | OUTPATIENT
Start: 2023-12-27 | End: 2024-01-25 | Stop reason: SDUPTHER

## 2024-01-17 ENCOUNTER — TELEPHONE (OUTPATIENT)
Dept: INFECTIOUS DISEASES | Facility: CLINIC | Age: 64
End: 2024-01-17
Payer: COMMERCIAL

## 2024-01-17 DIAGNOSIS — B00.9 HSV (HERPES SIMPLEX VIRUS) INFECTION: ICD-10-CM

## 2024-01-17 DIAGNOSIS — I10 PRIMARY HYPERTENSION: ICD-10-CM

## 2024-01-17 RX ORDER — LOSARTAN POTASSIUM 100 MG/1
100 TABLET ORAL
Qty: 90 TABLET | Refills: 3 | Status: SHIPPED | OUTPATIENT
Start: 2024-01-17

## 2024-01-17 NOTE — TELEPHONE ENCOUNTER
Last visit with Corrine Portillo, APRN: 8/28/2023  Last visit in [facility] INFECTIOUS DISEASE: 8/28/2023    Patient's next visit in [facility] INFECTIOUS DISEASE: 2/29/2024     LL 08/22/2023    Please advise on medication refill

## 2024-01-18 RX ORDER — ACYCLOVIR 400 MG/1
400 TABLET ORAL 2 TIMES DAILY
Qty: 180 TABLET | Refills: 1 | Status: SHIPPED | OUTPATIENT
Start: 2024-01-18

## 2024-01-19 RX ORDER — DESVENLAFAXINE 100 MG/1
1 TABLET, EXTENDED RELEASE ORAL DAILY
Qty: 90 TABLET | Refills: 3 | Status: SHIPPED | OUTPATIENT
Start: 2024-01-19

## 2024-01-19 NOTE — TELEPHONE ENCOUNTER
----- Message from Shaina Church sent at 1/19/2024 11:40 AM CST -----  Regarding: refill  Type:  RX Refill Request    Who Called: pt    RX Name and Strength:desvenlafaxine 100 mg Tb24    Preferred Pharmacy with phone number:[facility] Call Back Number:[phone]  Additional Information: called about needing a refill on meds

## 2024-01-25 DIAGNOSIS — M62.838 MUSCLE SPASM: ICD-10-CM

## 2024-01-25 NOTE — TELEPHONE ENCOUNTER
----- Message from Raya Rogers sent at 1/25/2024  2:55 PM CST -----  Regarding: refill  Type:  RX Refill Request    Who Called: pt  Refill or New Rx:refill  RX Name and Strength:carisoprodoL (SOMA) 350 MG tablet  How is the patient currently taking it? (ex. 1XDay):2 x day  Is this a 30 day or 90 day RX:  Preferred Pharmacy with phone number:[facility]  Local or Mail Order:local  Ordering Provider:dr hernandez   Would the patient rather a call back or a response via [facility]? C/b  Best Call Back Number:[phone]  Additional Information:

## 2024-01-29 RX ORDER — CARISOPRODOL 350 MG/1
350 TABLET ORAL 2 TIMES DAILY
Qty: 60 TABLET | Refills: 0 | Status: SHIPPED | OUTPATIENT
Start: 2024-01-29 | End: 2024-02-27 | Stop reason: SDUPTHER

## 2024-02-23 ENCOUNTER — LAB VISIT (OUTPATIENT)
Dept: LAB | Facility: HOSPITAL | Age: 64
End: 2024-02-23
Attending: NURSE PRACTITIONER
Payer: COMMERCIAL

## 2024-02-23 DIAGNOSIS — B20 HIV DISEASE: ICD-10-CM

## 2024-02-23 LAB
ALBUMIN SERPL-MCNC: 4.2 G/DL (ref 3.4–4.8)
ALBUMIN/GLOB SERPL: 1.3 RATIO (ref 1.1–2)
ALP SERPL-CCNC: 72 UNIT/L (ref 40–150)
ALT SERPL-CCNC: 20 UNIT/L (ref 0–55)
AST SERPL-CCNC: 16 UNIT/L (ref 5–34)
BASOPHILS # BLD AUTO: 0.03 X10(3)/MCL
BASOPHILS NFR BLD AUTO: 0.5 %
BILIRUB SERPL-MCNC: 0.3 MG/DL
BUN SERPL-MCNC: 15.8 MG/DL (ref 8.4–25.7)
CALCIUM SERPL-MCNC: 9.3 MG/DL (ref 8.8–10)
CHLORIDE SERPL-SCNC: 106 MMOL/L (ref 98–107)
CO2 SERPL-SCNC: 25 MMOL/L (ref 23–31)
CREAT SERPL-MCNC: 1.03 MG/DL (ref 0.73–1.18)
EOSINOPHIL # BLD AUTO: 0.18 X10(3)/MCL (ref 0–0.9)
EOSINOPHIL NFR BLD AUTO: 3.2 %
ERYTHROCYTE [DISTWIDTH] IN BLOOD BY AUTOMATED COUNT: 12.5 % (ref 11.5–17)
GFR SERPLBLD CREATININE-BSD FMLA CKD-EPI: >60 MLS/MIN/1.73/M2
GLOBULIN SER-MCNC: 3.3 GM/DL (ref 2.4–3.5)
GLUCOSE SERPL-MCNC: 104 MG/DL (ref 82–115)
HCT VFR BLD AUTO: 46.5 % (ref 42–52)
HGB BLD-MCNC: 15.4 G/DL (ref 14–18)
IMM GRANULOCYTES # BLD AUTO: 0.01 X10(3)/MCL (ref 0–0.04)
IMM GRANULOCYTES NFR BLD AUTO: 0.2 %
LYMPHOCYTES # BLD AUTO: 2.37 X10(3)/MCL (ref 0.6–4.6)
LYMPHOCYTES NFR BLD AUTO: 41.8 %
MCH RBC QN AUTO: 33 PG (ref 27–31)
MCHC RBC AUTO-ENTMCNC: 33.1 G/DL (ref 33–36)
MCV RBC AUTO: 99.6 FL (ref 80–94)
MONOCYTES # BLD AUTO: 0.61 X10(3)/MCL (ref 0.1–1.3)
MONOCYTES NFR BLD AUTO: 10.8 %
NEUTROPHILS # BLD AUTO: 2.47 X10(3)/MCL (ref 2.1–9.2)
NEUTROPHILS NFR BLD AUTO: 43.5 %
NRBC BLD AUTO-RTO: 0 %
PLATELET # BLD AUTO: 207 X10(3)/MCL (ref 130–400)
PMV BLD AUTO: 9.5 FL (ref 7.4–10.4)
POTASSIUM SERPL-SCNC: 4.3 MMOL/L (ref 3.5–5.1)
PROT SERPL-MCNC: 7.5 GM/DL (ref 5.8–7.6)
RBC # BLD AUTO: 4.67 X10(6)/MCL (ref 4.7–6.1)
SODIUM SERPL-SCNC: 139 MMOL/L (ref 136–145)
WBC # SPEC AUTO: 5.67 X10(3)/MCL (ref 4.5–11.5)

## 2024-02-23 PROCEDURE — 80053 COMPREHEN METABOLIC PANEL: CPT

## 2024-02-23 PROCEDURE — 85025 COMPLETE CBC W/AUTO DIFF WBC: CPT

## 2024-02-23 PROCEDURE — 87536 HIV-1 QUANT&REVRSE TRNSCRPJ: CPT

## 2024-02-23 PROCEDURE — 36415 COLL VENOUS BLD VENIPUNCTURE: CPT

## 2024-02-24 LAB — MAYO GENERIC ORDERABLE RESULT: NORMAL

## 2024-02-26 LAB — HIV1 RNA # PLAS NAA DL=20: NORMAL COPIES/ML

## 2024-02-27 ENCOUNTER — LAB VISIT (OUTPATIENT)
Dept: LAB | Facility: HOSPITAL | Age: 64
End: 2024-02-27
Attending: NURSE PRACTITIONER
Payer: COMMERCIAL

## 2024-02-27 DIAGNOSIS — M62.838 MUSCLE SPASM: ICD-10-CM

## 2024-02-27 DIAGNOSIS — B20 HIV DISEASE: Primary | ICD-10-CM

## 2024-02-27 DIAGNOSIS — B20 HIV INFECTION, UNSPECIFIED SYMPTOM STATUS: ICD-10-CM

## 2024-02-27 PROCEDURE — 36415 COLL VENOUS BLD VENIPUNCTURE: CPT

## 2024-02-27 PROCEDURE — 86361 T CELL ABSOLUTE COUNT: CPT

## 2024-02-27 RX ORDER — CARISOPRODOL 350 MG/1
350 TABLET ORAL 2 TIMES DAILY
Qty: 60 TABLET | Refills: 0 | Status: SHIPPED | OUTPATIENT
Start: 2024-02-27 | End: 2024-03-26 | Stop reason: SDUPTHER

## 2024-02-27 NOTE — TELEPHONE ENCOUNTER
----- Message from Carlie Monsivais sent at 2/27/2024 11:57 AM CST -----  .Type:  RX Refill Request    Who Called: pt  Refill or New Rx:refill  RX Name and Strength:carisoprodoL (SOMA) 350 MG tablet  How is the patient currently taking it? (ex. 1XDay):2x day  Is this a 30 day or 90 day RX:60  Preferred Pharmacy with phone number:[facility]  Local or Mail Order:local  Ordering Provider:Ana  Would the patient rather a call back or a response via [facility]? tavo  Best Call Back Number:[phone]  Additional Information: need refill

## 2024-02-28 LAB
AGE: 64
CD3+CD4+ CELLS # SPEC: 659 UNIT/L (ref 589–1505)
CD3+CD4+ CELLS NFR BLD: 36.2 %
LYMPHOCYTES # BLD AUTO: 1820 X10(3)/MCL (ref 1260–5520)
LYMPHOCYTES NFR LN MANUAL: 35 % (ref 28–48)
LYMPHOMA - T-CELL MARKERS SPEC-IMP: NORMAL
WBC # BLD AUTO: 5200 /MM3 (ref 4500–11500)

## 2024-02-29 ENCOUNTER — OFFICE VISIT (OUTPATIENT)
Dept: INFECTIOUS DISEASES | Facility: CLINIC | Age: 64
End: 2024-02-29
Payer: COMMERCIAL

## 2024-02-29 VITALS
HEIGHT: 69 IN | BODY MASS INDEX: 20.4 KG/M2 | RESPIRATION RATE: 16 BRPM | TEMPERATURE: 98 F | DIASTOLIC BLOOD PRESSURE: 72 MMHG | HEART RATE: 49 BPM | WEIGHT: 137.69 LBS | SYSTOLIC BLOOD PRESSURE: 126 MMHG

## 2024-02-29 DIAGNOSIS — B20 HIV DISEASE: Primary | ICD-10-CM

## 2024-02-29 PROCEDURE — 99214 OFFICE O/P EST MOD 30 MIN: CPT | Mod: S$PBB,,, | Performed by: NURSE PRACTITIONER

## 2024-02-29 PROCEDURE — 99214 OFFICE O/P EST MOD 30 MIN: CPT | Mod: PBBFAC | Performed by: NURSE PRACTITIONER

## 2024-02-29 RX ORDER — DESVENLAFAXINE 100 MG/1
100 TABLET, EXTENDED RELEASE ORAL
COMMUNITY
Start: 2024-01-19 | End: 2024-06-06

## 2024-02-29 RX ORDER — BICTEGRAVIR SODIUM, EMTRICITABINE, AND TENOFOVIR ALAFENAMIDE FUMARATE 50; 200; 25 MG/1; MG/1; MG/1
1 TABLET ORAL DAILY
Qty: 90 TABLET | Refills: 1 | Status: SHIPPED | OUTPATIENT
Start: 2024-02-29

## 2024-02-29 NOTE — PROGRESS NOTES
Patient ID: Reyes Ren 63 y.o.     Chief Complaint:   Chief Complaint   Patient presents with    Followup HIV     Denies problems        HPI:    2/29/24  Erik is a 62 yo WM presenting today for HIV f/u visit.  He is taking Biktarvy daily with viral suppression.  He is tolerating it well and initial side effects have subsided. Labs 2/24 VL UD, Cd4 659.  He is not sexually active, denies need for screenings. He tells me that he has been off acyclovir for 8 months now, no longer needing suppressive therapy. Will resume on PRN basis. He tells me that he will go to the health unit for Shingrix vaccination.  Has sought evaluation by Dr. Espino for facial fillers s/t loss of adiposity s/t prior ART toxicities. The initial treatment plan was denied by insurance, but Dr. Espino's office is appealing. He is hoping to hear back in next couple of weeks regarding same. He is hopeful to be able resume social outing with friends, as he has been very self conscious over the past 3-4 years regarding same. All questions answered & concerns addressed.    8/28/23  Erik is a 61 yo WM here today for HIV f/u visit.  He started Biktarvy x 2 months now. Labs 8/22/23 VL UD, CD4 959.  Renal function, LFTs, and CBC stable.  He tells me that he has noted some nausea in the mornings with occasional vomiting. He recently started taking it with a peanut butter cracker and this seems to help.  He also has some constipation with the change in regimen. Will notify me if it persists. Overall, doing well with Biktarvy.  He persists with drooping eyelids that are effecting his vision.  Will call Dr. Toussaint's office for evaluation & treatment. All questions answered & concerns addressed.      6/28/23  Erik is a 61 yo WM presenting today for HIV f/u visit.  He is virally suppressed on Combivir & Viracept daily, and tolerates it well.  Labs 6/20/23 VL <20, CD4 832.  STI screenings negative, not sexually active.  Vitamin D level normalized at 39.2, not  currently taking supplement. HSV suppressed with acyclovir.  Mild anemia with combivir, stable.  However, he tells me that he is starting to have some delays with medication refills due to decreasing availability.  He tells me that he is now ready to consider ART revision.  Will switch to Biktarvy 1 tablet daily.  Voiced understanding & appreciation.  All questions answered & concerns addressed.           Past Medical History:   Diagnosis Date    Anxiety     Atherosclerosis of native artery of both lower extremities with intermittent claudication 07/07/2020    Coronary artery disease     GERD (gastroesophageal reflux disease)     HIV infection     Hyperlipidemia     Hypertension         Past Surgical History:   Procedure Laterality Date    CARDIAC CATHETERIZATION      INSERTION OF STENT INTO PERIPHERAL VESSEL Right     INSERTION OF STENT INTO PERIPHERAL VESSEL Left 01/01/2007        Social History     Socioeconomic History    Marital status: Single   Tobacco Use    Smoking status: Some Days     Current packs/day: 0.25     Average packs/day: 0.3 packs/day for 0.2 years     Types: Cigarettes     Start date: 01/2024    Smokeless tobacco: Former   Substance and Sexual Activity    Alcohol use: Yes     Alcohol/week: 10.0 standard drinks of alcohol     Types: 10 Cans of beer per week     Comment: occassionally    Drug use: Not Currently    Sexual activity: Not Currently     Birth control/protection: None        Family History   Problem Relation Age of Onset    Heart disease Mother     Peripheral vascular disease Mother     Parkinsonism Father     Lung disease Sister     Heart disease Brother     Mental illness Brother         Review of patient's allergies indicates:   Allergen Reactions    Pitavastatin      Other reaction(s): Bone pain    Sulfamethoxazole-trimethoprim Rash        Immunization History   Administered Date(s) Administered    COVID-19, MRNA, LN-S, PF (Pfizer) (Purple Cap) 08/24/2021, 09/14/2021    Hepatitis A,  "Adult 05/16/2016, 02/20/2017    Meningococcal Conjugate 08/30/2018    Meningococcal Conjugate (MCV4P) 08/30/2018, 12/20/2018    Pneumococcal Conjugate - 13 Valent 10/22/2015    Pneumococcal Conjugate - 7 Valent 10/08/2008    Pneumococcal Polysaccharide - 23 Valent 09/19/2013, 03/01/2018    Td (ADULT) 01/02/2009, 05/10/2023    Td - PF (ADULT) 01/02/2009        Review of Systems   Constitutional: Negative.    HENT: Negative.     Eyes: Negative.    Respiratory: Negative.     Cardiovascular: Negative.    Gastrointestinal: Negative.    Genitourinary: Negative.    Musculoskeletal: Negative.    Skin: Negative.    Neurological: Negative.    Endo/Heme/Allergies: Negative.    Psychiatric/Behavioral: Negative.     All other systems reviewed and are negative.         Objective:      /72 (BP Location: Right arm, Patient Position: Sitting, BP Method: Medium (Automatic))   Pulse (!) 49   Temp 97.6 °F (36.4 °C) (Oral)   Resp 16   Ht 5' 9" (1.753 m)   Wt 62.5 kg (137 lb 11.2 oz)   BMI 20.33 kg/m²      Physical Exam  Vitals reviewed.   Constitutional:       General: He is not in acute distress.     Appearance: Normal appearance. He is not toxic-appearing.   HENT:      Mouth/Throat:      Mouth: Mucous membranes are moist.      Pharynx: Oropharynx is clear.   Eyes:      Conjunctiva/sclera: Conjunctivae normal.   Cardiovascular:      Rate and Rhythm: Normal rate and regular rhythm.   Pulmonary:      Effort: Pulmonary effort is normal. No respiratory distress.      Breath sounds: Normal breath sounds.   Abdominal:      General: Abdomen is flat. Bowel sounds are normal.      Palpations: Abdomen is soft.   Musculoskeletal:         General: Normal range of motion.      Cervical back: Normal range of motion.   Lymphadenopathy:      Cervical: No cervical adenopathy.   Skin:     General: Skin is warm and dry.   Neurological:      General: No focal deficit present.      Mental Status: He is alert and oriented to person, place, and " time. Mental status is at baseline.   Psychiatric:         Mood and Affect: Mood normal.         Behavior: Behavior normal.          Labs:   Lab Results   Component Value Date    WBC 5.67 02/23/2024    HGB 15.4 02/23/2024    HCT 46.5 02/23/2024    MCV 99.6 (H) 02/23/2024     02/23/2024       CMP  Sodium   Date Value Ref Range Status   04/06/2022 134 (L) 136 - 145 mmol/L Final     Sodium Level   Date Value Ref Range Status   02/23/2024 139 136 - 145 mmol/L Final     Potassium   Date Value Ref Range Status   04/06/2022 5.2 (H) 3.5 - 5.1 mmol/L Final     Potassium Level   Date Value Ref Range Status   02/23/2024 4.3 3.5 - 5.1 mmol/L Final     Chloride   Date Value Ref Range Status   04/06/2022 102 100 - 109 mmol/L Final     Carbon Dioxide   Date Value Ref Range Status   02/23/2024 25 23 - 31 mmol/L Final   04/06/2022 27 22 - 33 mmol/L Final     Blood Urea Nitrogen   Date Value Ref Range Status   02/23/2024 15.8 8.4 - 25.7 mg/dL Final   04/06/2022 11 7 - 18 mg/dL Final     Creatinine   Date Value Ref Range Status   02/23/2024 1.03 0.73 - 1.18 mg/dL Final   04/06/2022 0.85 0.57 - 1.25 mg/dL Final     Calcium   Date Value Ref Range Status   04/06/2022 9.2 8.8 - 10.6 mg/dL Final     Calcium Level Total   Date Value Ref Range Status   02/23/2024 9.3 8.8 - 10.0 mg/dL Final     Albumin Level   Date Value Ref Range Status   02/23/2024 4.2 3.4 - 4.8 g/dL Final     Bilirubin Total   Date Value Ref Range Status   02/23/2024 0.3 <=1.5 mg/dL Final     Alkaline Phosphatase   Date Value Ref Range Status   02/23/2024 72 40 - 150 unit/L Final     Aspartate Aminotransferase   Date Value Ref Range Status   02/23/2024 16 5 - 34 unit/L Final     Alanine Aminotransferase   Date Value Ref Range Status   02/23/2024 20 0 - 55 unit/L Final     Anion Gap   Date Value Ref Range Status   04/06/2022 5 (L) 8 - 16 mmol/L Final     eGFR   Date Value Ref Range Status   02/23/2024 >60 mls/min/1.73/m2 Final     Lab Results   Component Value Date     TSH 1.586 06/20/2023     Hep C Ab Interp   Date Value Ref Range Status   06/20/2023 Nonreactive Nonreactive Final     Syphilis Antibody   Date Value Ref Range Status   06/20/2023 Nonreactive Nonreactive, Equivocal Final     Cholesterol Total   Date Value Ref Range Status   11/16/2023 162 <=200 mg/dL Final     HDL Cholesterol   Date Value Ref Range Status   11/16/2023 44 35 - 60 mg/dL Final     Triglyceride   Date Value Ref Range Status   11/16/2023 139 34 - 140 mg/dL Final     Cholesterol/HDL Ratio   Date Value Ref Range Status   11/16/2023 4 0 - 5 Final     Very Low Density Lipoprotein   Date Value Ref Range Status   11/16/2023 28  Final     LDL Cholesterol   Date Value Ref Range Status   11/16/2023 90.00 50.00 - 140.00 mg/dL Final     Vit D 25 OH   Date Value Ref Range Status   06/20/2023 39.2 30.0 - 80.0 ng/mL Final     Results for orders placed or performed in visit on 02/27/24   CD4 Lymphocytes   Result Value Ref Range    Patient Age 63     WBC Absolute 5,200 4,500 - 11,500 /mm3    Lymph Percent 35 28 - 48 %    Lymph Absolute 1,820 1,260 - 5,520 x10(3)/mcL    CD4 % 36.2 %    CD4 Absolute 659 589 - 1,505 unit/L    T Cell Interp       Normal absolute lymphocyte count with normal absolute CD4+ lymphocyte count.     Nhan Montana M.D.     Narrative    This test was developed and its performance characteristics determined by [facility]. It has not been cleared or approved by the US Food and Drug Administration. The FDA does not require this test to go through premarket FDA review. This test is used for clinical purposes. It should not be regarded as investigational or for research. This laboratory is certified under the Clinical Laboratory Improvement Amendments (CLIA) as qualified to perform high complexity clinical laboratory testing.     Results for orders placed or performed in visit on 02/23/24   HIV-1 RNA, Quantitative, PCR with Reflex to Genotype   Result Value Ref Range     HIV-1 RNA Detect/Quant, P Undetected Undetected copies/mL     Results for orders placed or performed in visit on 06/20/23   Quantiferon Gold TB   Result Value Ref Range    QuantiFERON-Tb Gold Plus Result Negative Negative    TB1 Ag minus Nil Result 0.03 IU/mL    TB2 Ag minus Nil Result 0.01 IU/mL    Mitogen minus Nil Result 9.89 IU/mL    Nil Result 0.06 IU/mL     No results found for this or any previous visit.  Results for orders placed or performed in visit on 06/20/23   Urinalysis   Result Value Ref Range    Color, UA Light-Yellow Yellow, Light-Yellow, Dark Yellow, Lili, Straw    Appearance, UA Clear Clear    Specific Gravity, UA 1.020     pH, UA 5.5 5.0 - 8.5    Protein, UA Negative Negative mg/dL    Glucose, UA Normal Negative, Normal mg/dL    Ketones, UA Negative Negative mg/dL    Blood, UA Negative Negative unit/L    Bilirubin, UA Negative Negative mg/dL    Urobilinogen, UA Normal 0.2, 1.0, Normal mg/dL    Nitrites, UA Negative Negative    Leukocyte Esterase, UA Negative Negative unit/L    WBC, UA 0-5 None Seen, 0-2, 3-5, 0-5 /HPF    Bacteria, UA None Seen None Seen /HPF    Squamous Epithelial Cells, UA None Seen None Seen /HPF    Mucous, UA Trace (A) None Seen /LPF    Hyaline Casts, UA None Seen None Seen /lpf    RBC, UA 0-5 None Seen, 0-2, 3-5, 0-5 /HPF       Imaging: Reviewed most recent relevant imaging studies available, notable results highlighted in this note    Medications:     Current Outpatient Medications   Medication Instructions    acyclovir (ZOVIRAX) 400 mg, Oral, 2 times daily    albuterol (PROVENTIL/VENTOLIN HFA) 90 mcg/actuation inhaler INHALE 2 PUFFS BY MOUTH EVERY 6 HOURS AS NEEDED FOR SHORTNESS OF BREATH OR WHEEZING    ALPRAZolam (XANAX) 1 MG tablet TAKE ONE TABLET BY MOUTH TWICE DAILY AS NEEDED FOR ANXIETY    amLODIPine (NORVASC) 2.5 mg, Oral    aspirin (ECOTRIN) 81 mg, Oral    sddcwjrnx-uhioqmhh-aftinad ala (BIKTARVY) -25 mg (25 kg or greater) 1 tablet, Oral, Daily     carisoprodoL (SOMA) 350 mg, Oral, 2 times daily    clopidogreL (PLAVIX) 75 mg, Oral, Daily    desvenlafaxine 100 mg Tb24 1 tablet, Oral, Daily    desvenlafaxine succinate (PRISTIQ) 100 mg, Oral    esomeprazole (NEXIUM) 40 MG capsule TAKE 1 CAPSULE BY MOUTH ONCE DAILY    famotidine (PEPCID) 40 MG tablet TAKE 1 TABLET BY MOUTH AT BEDTIME    fluticasone propionate (FLONASE) 50 mcg/actuation nasal spray USE TWO SPRAYS IN EACH NOSTRIL ONCE DAILY    losartan (COZAAR) 100 mg, Oral    rosuvastatin (CRESTOR) 20 mg, Oral, Nightly       Assessment:       Problem List Items Addressed This Visit    None  Visit Diagnoses       HIV disease    -  Primary    Relevant Medications    zvgkpijou-ynoalugx-jnwzqyd ala (BIKTARVY) -25 mg (25 kg or greater)    Other Relevant Orders    Quantiferon Gold TB    Hemoglobin A1C    Hepatitis C Antibody    Vitamin D    TSH    Lipid Panel    SYPHILIS ANTIBODY (WITH REFLEX RPR)    Chlamydia/GC, PCR    Urinalysis    Comprehensive Metabolic Panel    CBC Auto Differential    HIV-1 RNA, Quantitative, PCR with Reflex to Genotype    Hepatitis A antibody, IgG    Hepatitis B Surface Ab, Qualitative    CD4 T-[place] Cells               Plan:      HIV disease  -     wqqmcbxwt-ebzfjpdx-jnatnaz ala (BIKTARVY) -25 mg (25 kg or greater); Take 1 tablet by mouth once daily.  Dispense: 90 tablet; Refill: 1  -     Quantiferon Gold TB; Future; Expected date: 08/01/2024  -     Hemoglobin A1C; Future; Expected date: 08/01/2024  -     Hepatitis C Antibody; Future; Expected date: 08/01/2024  -     Vitamin D; Future; Expected date: 08/01/2024  -     TSH; Future; Expected date: 08/01/2024  -     Lipid Panel; Future; Expected date: 08/01/2024  -     SYPHILIS ANTIBODY (WITH REFLEX RPR); Future; Expected date: 08/01/2024  -     Chlamydia/GC, PCR; Future; Expected date: 08/01/2024  -     Urinalysis; Future; Expected date: 08/01/2024  -     Comprehensive Metabolic Panel; Future; Expected date: 08/01/2024  -     CBC Auto  Differential; Future; Expected date: 08/01/2024  -     HIV-1 RNA, Quantitative, PCR with Reflex to Genotype; Future; Expected date: 08/01/2024  -     Hepatitis A antibody, IgG; Future; Expected date: 08/01/2024  -     Hepatitis B Surface Ab, Qualitative; Future; Expected date: 08/01/2024  -     CD4 T-[place] Cells; Future; Expected date: 08/01/2024    Adherence/sexual health counseling done.  Continue Biktarvy 1 tablet daily.   Repeat labs 1 wk prior to next visit.  RTC 6 months with Corrine.  Keep f/u with PCP as scheduled.

## 2024-03-26 DIAGNOSIS — M62.838 MUSCLE SPASM: ICD-10-CM

## 2024-03-26 RX ORDER — CARISOPRODOL 350 MG/1
350 TABLET ORAL 2 TIMES DAILY
Qty: 60 TABLET | Refills: 0 | Status: SHIPPED | OUTPATIENT
Start: 2024-03-26 | End: 2024-04-29

## 2024-03-26 NOTE — TELEPHONE ENCOUNTER
----- Message from Carlie Monsivais sent at 3/26/2024  9:12 AM CDT -----  .Type:  RX Refill Request    Who Called: pt  Refill or New Rx:refill  RX Name and Strength:carisoprodoL (SOMA) 350 MG tablet  How is the patient currently taking it? (ex. 1XDay):2x day  Is this a 30 day or 90 day RX:90  Preferred Pharmacy with phone number:[facility]  Local or Mail Order:local  Ordering Provider:Ana  Would the patient rather a call back or a response via [facility]?   Best Call Back Number:[phone]  Additional Information: need refill

## 2024-04-04 DIAGNOSIS — J30.2 SEASONAL ALLERGIES: ICD-10-CM

## 2024-04-04 RX ORDER — FLUTICASONE PROPIONATE 50 MCG
SPRAY, SUSPENSION (ML) NASAL
Qty: 16 G | Refills: 5 | Status: SHIPPED | OUTPATIENT
Start: 2024-04-04

## 2024-04-09 ENCOUNTER — TELEPHONE (OUTPATIENT)
Dept: URGENT CARE | Facility: CLINIC | Age: 64
End: 2024-04-09

## 2024-04-09 ENCOUNTER — OFFICE VISIT (OUTPATIENT)
Dept: URGENT CARE | Facility: CLINIC | Age: 64
End: 2024-04-09
Payer: COMMERCIAL

## 2024-04-09 VITALS
DIASTOLIC BLOOD PRESSURE: 80 MMHG | SYSTOLIC BLOOD PRESSURE: 179 MMHG | WEIGHT: 137 LBS | RESPIRATION RATE: 20 BRPM | TEMPERATURE: 98 F | HEIGHT: 69 IN | OXYGEN SATURATION: 99 % | BODY MASS INDEX: 20.29 KG/M2 | HEART RATE: 51 BPM

## 2024-04-09 DIAGNOSIS — S80.862A INSECT BITE OF LEFT LOWER EXTREMITY, INITIAL ENCOUNTER: ICD-10-CM

## 2024-04-09 DIAGNOSIS — R21 RASH: Primary | ICD-10-CM
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
## 2024-04-09 PROCEDURE — 99213 OFFICE O/P EST LOW 20 MIN: CPT | Mod: 25,,, | Performed by: NURSE PRACTITIONER

## 2024-04-09 PROCEDURE — 96372 THER/PROPH/DIAG INJ SC/IM: CPT | Mod: ,,, | Performed by: NURSE PRACTITIONER

## 2024-04-09 RX ORDER — HYDROCORTISONE 25 MG/G
CREAM TOPICAL 2 TIMES DAILY
Qty: 28 G | Refills: 0 | Status: SHIPPED | OUTPATIENT
Start: 2024-04-09

## 2024-04-09 RX ORDER — MUPIROCIN 20 MG/G
OINTMENT TOPICAL 3 TIMES DAILY
Qty: 15 G | Refills: 0 | Status: SHIPPED | OUTPATIENT
Start: 2024-04-09

## 2024-04-09 RX ORDER — BETAMETHASONE SODIUM PHOSPHATE AND BETAMETHASONE ACETATE 3; 3 MG/ML; MG/ML
6 INJECTION, SUSPENSION INTRA-ARTICULAR; INTRALESIONAL; INTRAMUSCULAR; SOFT TISSUE
Status: COMPLETED | OUTPATIENT
Start: 2024-04-09 | End: 2024-04-09

## 2024-04-09 RX ADMIN — BETAMETHASONE SODIUM PHOSPHATE AND BETAMETHASONE ACETATE 6 MG: 3; 3 INJECTION, SUSPENSION INTRA-ARTICULAR; INTRALESIONAL; INTRAMUSCULAR; SOFT TISSUE at 09:04

## 2024-04-09 NOTE — PATIENT INSTRUCTIONS
Apply mupirocin prescription ointment to possible insect bite  Apply your topical steroid cream to reddened areas and rash as instructed  Follow-up with your primary care provider or return here for concerns  Stop taking Bactrim

## 2024-04-09 NOTE — PROGRESS NOTES
"Subjective:      Patient ID: Reyes Ren is a 63 y.o. male.    Vitals:  height is 5' 9" (1.753 m) and weight is 62.1 kg (137 lb). His temperature is 97.8 °F (36.6 °C). His blood pressure is 179/80 (abnormal) and his pulse is 51 (abnormal). His respiration is 20 and oxygen saturation is 99%.     Chief Complaint: Rash    Pt presents to clinic with c/o rash to both legs and buttocks starting Saturday. Pt took one dose of Bactrim on Friday due to insect bite and suspects medication caused rash.       Skin:  Positive for rash (lower extremities posterior upper thighs) and erythema (posterior left knee, pea sized area of erythema).      Objective:     Physical Exam   Musculoskeletal: Normal range of motion.         General: Normal range of motion.   Skin: Skin is warm, dry and rash. erythema (posterior left knee, pea sized area of erythema)            Assessment:     1. Rash    2. Insect bite of left lower extremity, initial encounter        Plan:   Apply mupirocin prescription ointment to possible insect bite  Apply your topical steroid cream to reddened areas and rash as instructed  Follow-up with your primary care provider or return here for concerns  Stop taking Bactrim    Rash  -     betamethasone acetate-betamethasone sodium phosphate injection 6 mg    Insect bite of left lower extremity, initial encounter  -     mupirocin (BACTROBAN) 2 % ointment; Apply topically 3 (three) times daily.  Dispense: 15 g; Refill: 0                    "

## 2024-04-26 ENCOUNTER — TELEPHONE (OUTPATIENT)
Dept: INTERNAL MEDICINE | Facility: CLINIC | Age: 64
End: 2024-04-26
Payer: COMMERCIAL

## 2024-04-26 DIAGNOSIS — E78.5 HYPERLIPIDEMIA, UNSPECIFIED HYPERLIPIDEMIA TYPE: ICD-10-CM

## 2024-04-26 RX ORDER — ROSUVASTATIN CALCIUM 20 MG/1
20 TABLET, COATED ORAL NIGHTLY
Qty: 90 TABLET | Refills: 3 | Status: SHIPPED | OUTPATIENT
Start: 2024-04-26

## 2024-04-26 NOTE — TELEPHONE ENCOUNTER
----- Message from Carlie Monsivais sent at 4/26/2024  9:17 AM CDT -----  .Type:  Needs Medical Advice    Who Called: pt  Symptoms (please be specific):    How long has patient had these symptoms:    [facility]  Would the patient rather a call back or a response via [facility]? cb  Best Call Back Number: [phone]  Additional Information: rosuvastatin (CRESTOR) 20 MG tablet calling for refills

## 2024-04-29 DIAGNOSIS — I77.9 PERIPHERAL ARTERIAL OCCLUSIVE DISEASE: Primary | ICD-10-CM

## 2024-04-29 DIAGNOSIS — M62.838 MUSCLE SPASM: ICD-10-CM

## 2024-04-29 RX ORDER — CARISOPRODOL 350 MG/1
350 TABLET ORAL 2 TIMES DAILY
Qty: 60 TABLET | Refills: 0 | Status: SHIPPED | OUTPATIENT
Start: 2024-04-29 | End: 2024-05-28 | Stop reason: SDUPTHER

## 2024-04-29 RX ORDER — CLOPIDOGREL BISULFATE 75 MG/1
75 TABLET ORAL
Qty: 30 TABLET | Refills: 3 | Status: SHIPPED | OUTPATIENT
Start: 2024-04-29

## 2024-05-20 ENCOUNTER — TELEPHONE (OUTPATIENT)
Dept: INTERNAL MEDICINE | Facility: CLINIC | Age: 64
End: 2024-05-20
Payer: COMMERCIAL

## 2024-05-20 NOTE — TELEPHONE ENCOUNTER
----- Message from [facility] sent at 5/20/2024  2:56 PM CDT -----  .Type:  Needs Medical Advice    Who Called:  pt    Symptoms (please be specific):  no     How long has patient had these symptoms:   no    Pharmacy name and phone #:   no    Would the patient rather a call back or a response via [facility]?  No    Best Call Back Number:  [phone]    Additional Information:  pt wants to know if labs are needed before his appt on 6-6-24 please advise thanks

## 2024-05-27 ENCOUNTER — LAB VISIT (OUTPATIENT)
Dept: LAB | Facility: HOSPITAL | Age: 64
End: 2024-05-27
Attending: INTERNAL MEDICINE
Payer: COMMERCIAL

## 2024-05-27 DIAGNOSIS — E78.5 HYPERLIPIDEMIA, UNSPECIFIED HYPERLIPIDEMIA TYPE: ICD-10-CM

## 2024-05-27 DIAGNOSIS — I77.9 PERIPHERAL ARTERIAL OCCLUSIVE DISEASE: ICD-10-CM

## 2024-05-27 DIAGNOSIS — I10 PRIMARY HYPERTENSION: ICD-10-CM

## 2024-05-27 LAB
ALBUMIN SERPL-MCNC: 4.2 G/DL (ref 3.4–4.8)
ALBUMIN/GLOB SERPL: 1.3 RATIO (ref 1.1–2)
ALP SERPL-CCNC: 83 UNIT/L (ref 40–150)
ALT SERPL-CCNC: 27 UNIT/L (ref 0–55)
ANION GAP SERPL CALC-SCNC: 8 MEQ/L
AST SERPL-CCNC: 16 UNIT/L (ref 5–34)
BASOPHILS # BLD AUTO: 0.03 X10(3)/MCL
BASOPHILS NFR BLD AUTO: 0.5 %
BILIRUB SERPL-MCNC: 0.5 MG/DL
BUN SERPL-MCNC: 20.2 MG/DL (ref 8.4–25.7)
CALCIUM SERPL-MCNC: 9.7 MG/DL (ref 8.8–10)
CHLORIDE SERPL-SCNC: 107 MMOL/L (ref 98–107)
CHOLEST SERPL-MCNC: 151 MG/DL
CHOLEST/HDLC SERPL: 3 {RATIO} (ref 0–5)
CO2 SERPL-SCNC: 24 MMOL/L (ref 23–31)
CREAT SERPL-MCNC: 0.99 MG/DL (ref 0.73–1.18)
CREAT/UREA NIT SERPL: 20
EOSINOPHIL # BLD AUTO: 0.2 X10(3)/MCL (ref 0–0.9)
EOSINOPHIL NFR BLD AUTO: 3.1 %
ERYTHROCYTE [DISTWIDTH] IN BLOOD BY AUTOMATED COUNT: 12.3 % (ref 11.5–17)
GFR SERPLBLD CREATININE-BSD FMLA CKD-EPI: >60 ML/MIN/1.73/M2
GLOBULIN SER-MCNC: 3.3 GM/DL (ref 2.4–3.5)
GLUCOSE SERPL-MCNC: 98 MG/DL (ref 82–115)
HCT VFR BLD AUTO: 46.6 % (ref 42–52)
HDLC SERPL-MCNC: 44 MG/DL (ref 35–60)
HGB BLD-MCNC: 15.9 G/DL (ref 14–18)
IMM GRANULOCYTES # BLD AUTO: 0.01 X10(3)/MCL (ref 0–0.04)
IMM GRANULOCYTES NFR BLD AUTO: 0.2 %
LDLC SERPL CALC-MCNC: 77 MG/DL (ref 50–140)
LYMPHOCYTES # BLD AUTO: 3.07 X10(3)/MCL (ref 0.6–4.6)
LYMPHOCYTES NFR BLD AUTO: 47.7 %
MCH RBC QN AUTO: 33.7 PG (ref 27–31)
MCHC RBC AUTO-ENTMCNC: 34.1 G/DL (ref 33–36)
MCV RBC AUTO: 98.7 FL (ref 80–94)
MONOCYTES # BLD AUTO: 0.62 X10(3)/MCL (ref 0.1–1.3)
MONOCYTES NFR BLD AUTO: 9.6 %
NEUTROPHILS # BLD AUTO: 2.5 X10(3)/MCL (ref 2.1–9.2)
NEUTROPHILS NFR BLD AUTO: 38.9 %
NRBC BLD AUTO-RTO: 0 %
PLATELET # BLD AUTO: 208 X10(3)/MCL (ref 130–400)
PMV BLD AUTO: 9.9 FL (ref 7.4–10.4)
POTASSIUM SERPL-SCNC: 4.2 MMOL/L (ref 3.5–5.1)
PROT SERPL-MCNC: 7.5 GM/DL (ref 5.8–7.6)
RBC # BLD AUTO: 4.72 X10(6)/MCL (ref 4.7–6.1)
SODIUM SERPL-SCNC: 139 MMOL/L (ref 136–145)
TRIGL SERPL-MCNC: 150 MG/DL (ref 34–140)
VLDLC SERPL CALC-MCNC: 30 MG/DL
WBC # SPEC AUTO: 6.43 X10(3)/MCL (ref 4.5–11.5)

## 2024-05-27 PROCEDURE — 85025 COMPLETE CBC W/AUTO DIFF WBC: CPT

## 2024-05-27 PROCEDURE — 80061 LIPID PANEL: CPT

## 2024-05-27 PROCEDURE — 36415 COLL VENOUS BLD VENIPUNCTURE: CPT

## 2024-05-27 PROCEDURE — 80053 COMPREHEN METABOLIC PANEL: CPT

## 2024-05-28 DIAGNOSIS — M62.838 MUSCLE SPASM: ICD-10-CM

## 2024-05-28 NOTE — TELEPHONE ENCOUNTER
----- Message from Raya Rogers sent at 5/28/2024  9:10 AM CDT -----  Regarding: refill  Type:  RX Refill Request    Who Called: pt   Refill or New Rx:refill  RX Name and Strength:carisoprodoL (SOMA) 350 MG tablet  How is the patient currently taking it? (ex. 1XDay): 2 x day  Is this a 30 day or 90 day RX:30  Preferred Pharmacy with phone number:[facility]  Local or Mail Order:local  Ordering Provider:dr hernandez  Would the patient rather a call back or a response via [facility]? C/b  Best Call Back Number:[phone]  Additional Information:

## 2024-05-29 RX ORDER — CARISOPRODOL 350 MG/1
350 TABLET ORAL 2 TIMES DAILY
Qty: 60 TABLET | Refills: 0 | Status: SHIPPED | OUTPATIENT
Start: 2024-05-29

## 2024-06-05 DIAGNOSIS — K21.9 GASTROESOPHAGEAL REFLUX DISEASE, UNSPECIFIED WHETHER ESOPHAGITIS PRESENT: ICD-10-CM

## 2024-06-05 RX ORDER — ESOMEPRAZOLE MAGNESIUM 40 MG/1
CAPSULE, DELAYED RELEASE ORAL
Qty: 30 CAPSULE | Refills: 11 | Status: SHIPPED | OUTPATIENT
Start: 2024-06-05

## 2024-06-05 RX ORDER — FAMOTIDINE 40 MG/1
TABLET, FILM COATED ORAL
Qty: 30 TABLET | Refills: 11 | Status: SHIPPED | OUTPATIENT
Start: 2024-06-05

## 2024-06-05 RX ORDER — ALPRAZOLAM 1 MG/1
TABLET ORAL
Qty: 60 TABLET | Refills: 5 | Status: SHIPPED | OUTPATIENT
Start: 2024-06-05

## 2024-06-06 ENCOUNTER — OFFICE VISIT (OUTPATIENT)
Dept: INTERNAL MEDICINE | Facility: CLINIC | Age: 64
End: 2024-06-06
Payer: COMMERCIAL

## 2024-06-06 VITALS
BODY MASS INDEX: 19.85 KG/M2 | HEIGHT: 69 IN | RESPIRATION RATE: 18 BRPM | OXYGEN SATURATION: 96 % | HEART RATE: 58 BPM | SYSTOLIC BLOOD PRESSURE: 106 MMHG | DIASTOLIC BLOOD PRESSURE: 62 MMHG | WEIGHT: 134 LBS

## 2024-06-06 DIAGNOSIS — B20 HIV INFECTION, UNSPECIFIED SYMPTOM STATUS: ICD-10-CM

## 2024-06-06 DIAGNOSIS — Z00.00 WELLNESS EXAMINATION: Primary | ICD-10-CM

## 2024-06-06 DIAGNOSIS — I10 PRIMARY HYPERTENSION: ICD-10-CM

## 2024-06-06 DIAGNOSIS — E78.5 HYPERLIPIDEMIA, UNSPECIFIED HYPERLIPIDEMIA TYPE: ICD-10-CM

## 2024-06-06 DIAGNOSIS — I77.9 PERIPHERAL ARTERIAL OCCLUSIVE DISEASE: ICD-10-CM

## 2024-06-06 DIAGNOSIS — G89.29 OTHER CHRONIC PAIN: ICD-10-CM

## 2024-06-06 DIAGNOSIS — F41.8 MIXED ANXIETY DEPRESSIVE DISORDER: ICD-10-CM

## 2024-06-06 PROCEDURE — 3078F DIAST BP <80 MM HG: CPT | Mod: CPTII,,, | Performed by: INTERNAL MEDICINE

## 2024-06-06 PROCEDURE — 1160F RVW MEDS BY RX/DR IN RCRD: CPT | Mod: CPTII,,, | Performed by: INTERNAL MEDICINE

## 2024-06-06 PROCEDURE — 1159F MED LIST DOCD IN RCRD: CPT | Mod: CPTII,,, | Performed by: INTERNAL MEDICINE

## 2024-06-06 PROCEDURE — 3008F BODY MASS INDEX DOCD: CPT | Mod: CPTII,,, | Performed by: INTERNAL MEDICINE

## 2024-06-06 PROCEDURE — 4010F ACE/ARB THERAPY RXD/TAKEN: CPT | Mod: CPTII,,, | Performed by: INTERNAL MEDICINE

## 2024-06-06 PROCEDURE — 3074F SYST BP LT 130 MM HG: CPT | Mod: CPTII,,, | Performed by: INTERNAL MEDICINE

## 2024-06-06 PROCEDURE — 99396 PREV VISIT EST AGE 40-64: CPT | Mod: ,,, | Performed by: INTERNAL MEDICINE

## 2024-06-06 NOTE — PROGRESS NOTES
Subjective:      Chief Complaint: Annual Exam (Discuss labs)      HPI:He is here for a wellness.  He still gets some anxiety.  He does have the chronic back pain.  He wears a brace.  He checks his BP periodically at home, and its good.  He sees Dr. Ramirez for his PAD.  He tells me he has 50% on the left side.  He is walking to help minimize problems with the pad.  He takes the xanax bid every day.    Problem Noted   Wellness Examination 5/10/2023   Degeneration of Intervertebral Disc of Lumbar Region 10/21/2022   Gastroesophageal Reflux Disease 10/21/2022   Hyperlipidemia 10/21/2022   Hypertension 10/21/2022   Hypogonadism in Male 10/21/2022   Mixed Anxiety Depressive Disorder 10/21/2022   Neuropathy 10/21/2022   Panic Attack 10/21/2022   Peripheral Arterial Occlusive Disease 10/21/2022    s/p stents/angioplasty right lower ext.had blockage in his left calf that was stented and then re-occluded.  Then received another opinion from Dr. Ramirez who rec exercise and conditioning and that has helped alot.  He has to walk a pretty long distance before it starts to cramp.     Sciatica 10/21/2022   Chronic Pain 10/21/2022   HIV Infection 6/27/2022    has been undetectable for 13-14 years     Atherosclerosis of Native Artery of Both Lower Extremities With Intermittent Claudication (Resolved) 7/7/2020        The patient's Health Maintenance was reviewed and the following appears to be due:   Health Maintenance Due   Topic Date Due    Shingles Vaccine (1 of 2) Never done    RSV Vaccine (Age 60+ and Pregnant patients) (1 - 1-dose 60+ series) Never done    COVID-19 Vaccine (3 - Pfizer risk series) 10/12/2021       Past Medical History:  Past Medical History:   Diagnosis Date    Anxiety     Atherosclerosis of native artery of both lower extremities with intermittent claudication 07/07/2020    Coronary artery disease     GERD (gastroesophageal reflux disease)     HIV infection     Hyperlipidemia     Hypertension      Review of  patient's allergies indicates:   Allergen Reactions    Pitavastatin      Other reaction(s): Bone pain    Sulfamethoxazole-trimethoprim Rash     Current Outpatient Medications on File Prior to Visit   Medication Sig Dispense Refill    acyclovir (ZOVIRAX) 400 MG tablet Take 1 tablet (400 mg total) by mouth 2 (two) times daily. 180 tablet 1    albuterol (PROVENTIL/VENTOLIN HFA) 90 mcg/actuation inhaler INHALE 2 PUFFS BY MOUTH EVERY 6 HOURS AS NEEDED FOR SHORTNESS OF BREATH OR WHEEZING 18 g 6    ALPRAZolam (XANAX) 1 MG tablet TAKE ONE TABLET BY MOUTH TWICE DAILY AS NEEDED FOR ANXIETY 60 tablet 5    amLODIPine (NORVASC) 2.5 MG tablet TAKE ONE TABLET BY MOUTH ONCE DAILY 30 tablet 11    aspirin (ECOTRIN) 81 MG EC tablet Take 81 mg by mouth.      utvwfinqy-hjqvworo-njzyyxg ala (BIKTARVY) -25 mg (25 kg or greater) Take 1 tablet by mouth once daily. 90 tablet 1    carisoprodoL (SOMA) 350 MG tablet Take 1 tablet (350 mg total) by mouth 2 (two) times daily. 60 tablet 0    clopidogreL (PLAVIX) 75 mg tablet TAKE ONE TABLET BY MOUTH ONCE DAILY 30 tablet 3    desvenlafaxine 100 mg Tb24 Take 1 tablet by mouth once daily. 90 tablet 3    esomeprazole (NEXIUM) 40 MG capsule TAKE 1 CAPSULE BY MOUTH ONCE DAILY 30 capsule 11    famotidine (PEPCID) 40 MG tablet TAKE 1 TABLET BY MOUTH AT BEDTIME 30 tablet 11    fluticasone propionate (FLONASE) 50 mcg/actuation nasal spray USE TWO SPRAYS IN EACH NOSTRIL ONCE DAILY 16 g 5    hydrocortisone 2.5 % cream Apply topically 2 (two) times daily. 28 g 0    losartan (COZAAR) 100 MG tablet TAKE ONE TABLET BY MOUTH ONCE DAILY 90 tablet 3    mupirocin (BACTROBAN) 2 % ointment Apply topically 3 (three) times daily. 15 g 0    rosuvastatin (CRESTOR) 20 MG tablet Take 1 tablet (20 mg total) by mouth every evening. 90 tablet 3    [DISCONTINUED] desvenlafaxine succinate (PRISTIQ) 100 MG Tb24 Take 100 mg by mouth.       No current facility-administered medications on file prior to visit.       Review of  "Systems   HENT:  Positive for congestion.    Respiratory: Negative.     Cardiovascular: Negative.    Gastrointestinal: Negative.    Genitourinary: Negative.    Neurological:  Positive for headaches (mild sinus related headache).       Objective:   /62 (BP Location: Left arm, Patient Position: Sitting, BP Method: Small (Manual))   Pulse (!) 58   Resp 18   Ht 5' 9.02" (1.753 m)   Wt 60.8 kg (134 lb)   SpO2 96%   BMI 19.78 kg/m²     Physical Exam  Vitals reviewed.   Constitutional:       Appearance: Normal appearance.   HENT:      Head: Normocephalic and atraumatic.   Eyes:      Conjunctiva/sclera: Conjunctivae normal.   Cardiovascular:      Rate and Rhythm: Normal rate and regular rhythm.      Pulses: Normal pulses.      Heart sounds: Normal heart sounds. No murmur heard.     No friction rub. No gallop.   Pulmonary:      Effort: Pulmonary effort is normal. No respiratory distress.      Breath sounds: Normal breath sounds. No wheezing, rhonchi or rales.   Abdominal:      General: Abdomen is flat. There is no distension.      Palpations: Abdomen is soft. There is no mass.      Tenderness: There is no guarding or rebound.   Skin:     General: Skin is warm and dry.      Findings: Lesion (small sl irregular black mole on his left torso just below the breast area) present.   Neurological:      General: No focal deficit present.      Mental Status: He is alert and oriented to person, place, and time.   Psychiatric:         Mood and Affect: Mood normal.         Behavior: Behavior normal.         Thought Content: Thought content normal.         Judgment: Judgment normal.       Assessment and Plan:     Wellness examination  Health Maintenance Due   Topic Date Due    Shingles Vaccine (1 of 2) Never done    RSV Vaccine (Age 60+ and Pregnant patients) (1 - 1-dose 60+ series) Never done    COVID-19 Vaccine (3 - Pfizer risk series) 10/12/2021     Recent labs reviewed and discussed.    HIV infection  He is followed by the " infectious disease clinic at [facility].  No issues.    Hyperlipidemia  Controlled.  Continue crestor.    Hypertension  Controlled. Continue losartan, amlodipine.    Mixed anxiety depressive disorder  Stable. Continue pristiq and xana bid.    Peripheral arterial occlusive disease  Stable, followed by Ashley Soto, on plavix.    Chronic pain  He takes soma chronically. stable    Follow up in about 6 months (around 12/6/2024).       [unfilled]  No orders of the defined types were placed in this encounter.

## 2024-06-06 NOTE — ASSESSMENT & PLAN NOTE
Health Maintenance Due   Topic Date Due    Shingles Vaccine (1 of 2) Never done    RSV Vaccine (Age 60+ and Pregnant patients) (1 - 1-dose 60+ series) Never done    COVID-19 Vaccine (3 - Pfizer risk series) 10/12/2021     Recent labs reviewed and discussed.

## 2024-06-20 DIAGNOSIS — B00.9 HSV (HERPES SIMPLEX VIRUS) INFECTION: ICD-10-CM

## 2024-06-20 RX ORDER — ACYCLOVIR 400 MG/1
400 TABLET ORAL 2 TIMES DAILY
Qty: 180 TABLET | Refills: 1 | Status: SHIPPED | OUTPATIENT
Start: 2024-06-20

## 2024-06-27 DIAGNOSIS — M62.838 MUSCLE SPASM: ICD-10-CM

## 2024-06-27 RX ORDER — CARISOPRODOL 350 MG/1
350 TABLET ORAL 2 TIMES DAILY
Qty: 60 TABLET | Refills: 0 | Status: SHIPPED | OUTPATIENT
Start: 2024-06-27

## 2024-07-25 DIAGNOSIS — M62.838 MUSCLE SPASM: ICD-10-CM

## 2024-07-25 RX ORDER — CARISOPRODOL 350 MG/1
350 TABLET ORAL 2 TIMES DAILY
Qty: 60 TABLET | Refills: 0 | Status: SHIPPED | OUTPATIENT
Start: 2024-07-25

## 2024-07-25 NOTE — TELEPHONE ENCOUNTER
----- Message from Grazyna Rojo sent at 7/25/2024  1:12 PM CDT -----  Regarding: refill  .Type:  RX Refill Request    Who Called: pt  Refill or New Rx:refill  RX Name and Strength:carisoprodoL (SOMA) 350 MG tablet  How is the patient currently taking it? (ex. 1XDay): TAKE ONE TABLET BY MOUTH TWICE DAILY  Is this a 30 day or 90 day RX:60  Preferred Pharmacy with phone number:[facility] [address]      Local or Mail Order:  Ordering Provider:  Would the patient rather a call back or a response via [facility]? no  Best Call Back Number:  Additional Information:

## 2024-07-31 DIAGNOSIS — I77.9 PERIPHERAL ARTERIAL OCCLUSIVE DISEASE: ICD-10-CM

## 2024-07-31 DIAGNOSIS — I10 PRIMARY HYPERTENSION: ICD-10-CM

## 2024-07-31 RX ORDER — AMLODIPINE BESYLATE 2.5 MG/1
2.5 TABLET ORAL
Qty: 30 TABLET | Refills: 11 | Status: SHIPPED | OUTPATIENT
Start: 2024-07-31

## 2024-07-31 RX ORDER — CLOPIDOGREL BISULFATE 75 MG/1
75 TABLET ORAL
Qty: 30 TABLET | Refills: 3 | Status: SHIPPED | OUTPATIENT
Start: 2024-07-31

## 2024-08-23 ENCOUNTER — LAB VISIT (OUTPATIENT)
Dept: LAB | Facility: HOSPITAL | Age: 64
End: 2024-08-23
Attending: NURSE PRACTITIONER
Payer: COMMERCIAL

## 2024-08-23 DIAGNOSIS — B20 HIV DISEASE: ICD-10-CM

## 2024-08-23 LAB
25(OH)D3+25(OH)D2 SERPL-MCNC: 35 NG/ML (ref 30–80)
ALBUMIN SERPL-MCNC: 4.1 G/DL (ref 3.4–4.8)
ALBUMIN/GLOB SERPL: 1.2 RATIO (ref 1.1–2)
ALP SERPL-CCNC: 102 UNIT/L (ref 40–150)
ALT SERPL-CCNC: 34 UNIT/L (ref 0–55)
ANION GAP SERPL CALC-SCNC: 10 MEQ/L
AST SERPL-CCNC: 18 UNIT/L (ref 5–34)
BASOPHILS # BLD AUTO: 0.02 X10(3)/MCL
BASOPHILS NFR BLD AUTO: 0.4 %
BILIRUB SERPL-MCNC: 0.3 MG/DL
BUN SERPL-MCNC: 12.6 MG/DL (ref 8.4–25.7)
CALCIUM SERPL-MCNC: 9.8 MG/DL (ref 8.8–10)
CHLORIDE SERPL-SCNC: 105 MMOL/L (ref 98–107)
CHOLEST SERPL-MCNC: 153 MG/DL
CHOLEST/HDLC SERPL: 5 {RATIO} (ref 0–5)
CO2 SERPL-SCNC: 23 MMOL/L (ref 23–31)
CREAT SERPL-MCNC: 0.96 MG/DL (ref 0.73–1.18)
CREAT/UREA NIT SERPL: 13
EOSINOPHIL # BLD AUTO: 0.23 X10(3)/MCL (ref 0–0.9)
EOSINOPHIL NFR BLD AUTO: 4.2 %
ERYTHROCYTE [DISTWIDTH] IN BLOOD BY AUTOMATED COUNT: 11.8 % (ref 11.5–17)
EST. AVERAGE GLUCOSE BLD GHB EST-MCNC: 114 MG/DL
GFR SERPLBLD CREATININE-BSD FMLA CKD-EPI: >60 ML/MIN/1.73/M2
GLOBULIN SER-MCNC: 3.3 GM/DL (ref 2.4–3.5)
GLUCOSE SERPL-MCNC: 115 MG/DL (ref 82–115)
HAV AB SER QL IA: REACTIVE
HBA1C MFR BLD: 5.6 %
HBV SURFACE AB SER-ACNC: 426.09 MIU/ML
HBV SURFACE AB SERPL IA-ACNC: REACTIVE M[IU]/ML
HCT VFR BLD AUTO: 46.5 % (ref 42–52)
HCV AB SERPL QL IA: NONREACTIVE
HDLC SERPL-MCNC: 30 MG/DL (ref 35–60)
HGB BLD-MCNC: 15.6 G/DL (ref 14–18)
IMM GRANULOCYTES # BLD AUTO: 0.01 X10(3)/MCL (ref 0–0.04)
IMM GRANULOCYTES NFR BLD AUTO: 0.2 %
LYMPHOCYTES # BLD AUTO: 2.71 X10(3)/MCL (ref 0.6–4.6)
LYMPHOCYTES NFR BLD AUTO: 49.5 %
MCH RBC QN AUTO: 33.6 PG (ref 27–31)
MCHC RBC AUTO-ENTMCNC: 33.5 G/DL (ref 33–36)
MCV RBC AUTO: 100.2 FL (ref 80–94)
MONOCYTES # BLD AUTO: 0.54 X10(3)/MCL (ref 0.1–1.3)
MONOCYTES NFR BLD AUTO: 9.9 %
NEUTROPHILS # BLD AUTO: 1.97 X10(3)/MCL (ref 2.1–9.2)
NEUTROPHILS NFR BLD AUTO: 35.8 %
NRBC BLD AUTO-RTO: 0 %
PLATELET # BLD AUTO: 210 X10(3)/MCL (ref 130–400)
PMV BLD AUTO: 10.2 FL (ref 7.4–10.4)
POTASSIUM SERPL-SCNC: 3.8 MMOL/L (ref 3.5–5.1)
PROT SERPL-MCNC: 7.4 GM/DL (ref 5.8–7.6)
RBC # BLD AUTO: 4.64 X10(6)/MCL (ref 4.7–6.1)
SODIUM SERPL-SCNC: 138 MMOL/L (ref 136–145)
T PALLIDUM AB SER QL: NONREACTIVE
TRIGL SERPL-MCNC: 463 MG/DL (ref 34–140)
TSH SERPL-ACNC: 1.23 UIU/ML (ref 0.35–4.94)
WBC # BLD AUTO: 5.48 X10(3)/MCL (ref 4.5–11.5)

## 2024-08-23 PROCEDURE — 82306 VITAMIN D 25 HYDROXY: CPT

## 2024-08-23 PROCEDURE — 80053 COMPREHEN METABOLIC PANEL: CPT

## 2024-08-23 PROCEDURE — 84443 ASSAY THYROID STIM HORMONE: CPT

## 2024-08-23 PROCEDURE — 87536 HIV-1 QUANT&REVRSE TRNSCRPJ: CPT

## 2024-08-23 PROCEDURE — 86708 HEPATITIS A ANTIBODY: CPT

## 2024-08-23 PROCEDURE — 85025 COMPLETE CBC W/AUTO DIFF WBC: CPT

## 2024-08-23 PROCEDURE — 80061 LIPID PANEL: CPT

## 2024-08-23 PROCEDURE — 86780 TREPONEMA PALLIDUM: CPT

## 2024-08-23 PROCEDURE — 86803 HEPATITIS C AB TEST: CPT

## 2024-08-23 PROCEDURE — 36415 COLL VENOUS BLD VENIPUNCTURE: CPT

## 2024-08-23 PROCEDURE — 83036 HEMOGLOBIN GLYCOSYLATED A1C: CPT

## 2024-08-23 PROCEDURE — 86480 TB TEST CELL IMMUN MEASURE: CPT

## 2024-08-23 PROCEDURE — 86706 HEP B SURFACE ANTIBODY: CPT

## 2024-08-23 PROCEDURE — 86360 T CELL ABSOLUTE COUNT/RATIO: CPT

## 2024-08-25 LAB
CD3 CELLS # BLD: 2131 CELLS/MCL (ref 550–2202)
CD3 CELLS NFR BLD: 85 % (ref 58–86)
CD3+CD4+ CELLS # BLD: 948 CELLS/MCL (ref 365–1437)
CD3+CD4+ CELLS NFR BLD: 38 % (ref 32–64)
CD3+CD4+ CELLS/CD3+CD8+ CLL BLD: 0.8 %
CD3+CD8+ CELLS # BLD: 1166 CELLS/MCL (ref 80–846)
CD3+CD8+ CELLS NFR BLD: 46 % (ref 8–40)
CD45 CELLS # BLD: 2.51 THOU/MCL (ref 0.82–2.84)
HIV1 RNA # PLAS NAA DL=20: <20 COPIES/ML

## 2024-08-26 ENCOUNTER — TELEPHONE (OUTPATIENT)
Dept: INTERNAL MEDICINE | Facility: CLINIC | Age: 64
End: 2024-08-26
Payer: COMMERCIAL

## 2024-08-26 DIAGNOSIS — M62.838 MUSCLE SPASM: ICD-10-CM

## 2024-08-26 RX ORDER — CARISOPRODOL 350 MG/1
350 TABLET ORAL 2 TIMES DAILY
Qty: 60 TABLET | Refills: 0 | Status: SHIPPED | OUTPATIENT
Start: 2024-08-26

## 2024-08-26 NOTE — TELEPHONE ENCOUNTER
----- Message from Mikayla Yoon sent at 8/26/2024  8:19 AM CDT -----  Regarding: Refill Request  .Who Called: Reyes Ren    Refill or New Rx:Refill  RX Name and Strength:carisoprodoL (SOMA) 350 MG tablet  How is the patient currently taking it? (ex. 1XDay):2x  Is this a 30 day or 90 day RX:30  Local or Mail Order:local   List of preferred pharmacies on file (remove unneeded): [unfilled]  If different Pharmacy is requested, enter Pharmacy information here including location and phone number: [facility] [address]   Ordering Provider:MELISSA Perez      Preferred Method of Contact: Phone Call  Patient's Preferred Phone Number on File: [phone]   Best Call Back Number, if different:  Additional Information: refill request

## 2024-08-26 NOTE — TELEPHONE ENCOUNTER
I have signed for the following orders and/or meds.  Please inform the patient.    No orders of the defined types were placed in this encounter.    Medications Ordered This Encounter   Medications    carisoprodoL (SOMA) 350 MG tablet     Sig: Take 1 tablet (350 mg total) by mouth 2 (two) times daily.     Dispense:  60 tablet     Refill:  0

## 2024-08-27 LAB
GAMMA INTERFERON BACKGROUND BLD IA-ACNC: 0.03 IU/ML
M TB IFN-G BLD-IMP: NEGATIVE
M TB IFN-G CD4+ BCKGRND COR BLD-ACNC: 0.09 IU/ML
M TB IFN-G CD4+CD8+ BCKGRND COR BLD-ACNC: 0.05 IU/ML
MITOGEN IGNF BCKGRD COR BLD-ACNC: 9.09 IU/ML

## 2024-08-29 ENCOUNTER — OFFICE VISIT (OUTPATIENT)
Dept: INFECTIOUS DISEASES | Facility: CLINIC | Age: 64
End: 2024-08-29
Payer: COMMERCIAL

## 2024-08-29 VITALS
SYSTOLIC BLOOD PRESSURE: 119 MMHG | WEIGHT: 132.06 LBS | DIASTOLIC BLOOD PRESSURE: 70 MMHG | RESPIRATION RATE: 12 BRPM | TEMPERATURE: 98 F | BODY MASS INDEX: 19.56 KG/M2 | HEART RATE: 53 BPM | HEIGHT: 69 IN

## 2024-08-29 DIAGNOSIS — B20 HIV DISEASE: Primary | ICD-10-CM

## 2024-08-29 PROCEDURE — 99215 OFFICE O/P EST HI 40 MIN: CPT | Mod: PBBFAC | Performed by: NURSE PRACTITIONER

## 2024-08-29 RX ORDER — DESVENLAFAXINE 100 MG/1
100 TABLET, EXTENDED RELEASE ORAL
COMMUNITY
Start: 2024-06-23

## 2024-08-29 RX ORDER — BICTEGRAVIR SODIUM, EMTRICITABINE, AND TENOFOVIR ALAFENAMIDE FUMARATE 50; 200; 25 MG/1; MG/1; MG/1
1 TABLET ORAL DAILY
Qty: 90 TABLET | Refills: 1 | Status: SHIPPED | OUTPATIENT
Start: 2024-08-29

## 2024-08-29 NOTE — PROGRESS NOTES
Patient ID: Reyes Ren 63 y.o.     Chief Complaint:   Chief Complaint   Patient presents with    Followup HIV     Denies problems        HPI:    8/29/24  Erik is a 64 yo WM here today for HIV f/u visit.  He takes Biktarvy daily and is tolerating it well. Labs 8/24 VL <20, Cd4 948, RPR NR. He tells me that he did go ahead and get Sculptra injections done with Dr. Espino. He states that he had 12 syringes injected. He experienced right facial drooping for at least 6 weeks after the injections (mixed with local anesthesia) were given. He is considering moving forward with fillers or more Sculptra but would select another office with more experience using Sculptra (Dr. Perea).  Encouraged him to explore the option for improved quality of life. Will consider. All questions answered & concerns addressed.    2/29/24  Erik is a 64 yo WM presenting today for HIV f/u visit.  He is taking Biktarvy daily with viral suppression.  He is tolerating it well and initial side effects have subsided. Labs 2/24 VL UD, Cd4 659.  He is not sexually active, denies need for screenings. He tells me that he has been off acyclovir for 8 months now, no longer needing suppressive therapy. Will resume on PRN basis. He tells me that he will go to the health unit for Shingrix vaccination.  Has sought evaluation by Dr. Espino for facial fillers s/t loss of adiposity s/t prior ART toxicities. The initial treatment plan was denied by insurance, but Dr. Espino's office is appealing. He is hoping to hear back in next couple of weeks regarding same. He is hopeful to be able resume social outing with friends, as he has been very self conscious over the past 3-4 years regarding same. All questions answered & concerns addressed.     8/28/23  Erik is a 63 yo WM here today for HIV f/u visit.  He started Biktarvy x 2 months now. Labs 8/22/23 VL UD, CD4 959.  Renal function, LFTs, and CBC stable.  He tells me that he has noted some nausea in the  mornings with occasional vomiting. He recently started taking it with a peanut butter cracker and this seems to help.  He also has some constipation with the change in regimen. Will notify me if it persists. Overall, doing well with Biktarvy.  He persists with drooping eyelids that are effecting his vision.  Will call Dr. Toussaint's office for evaluation & treatment. All questions answered & concerns addressed.           Past Medical History:   Diagnosis Date    Anxiety     Atherosclerosis of native artery of both lower extremities with intermittent claudication 07/07/2020    Coronary artery disease     GERD (gastroesophageal reflux disease)     HIV infection     Hyperlipidemia     Hypertension         Past Surgical History:   Procedure Laterality Date    CARDIAC CATHETERIZATION      INSERTION OF STENT INTO PERIPHERAL VESSEL Right     INSERTION OF STENT INTO PERIPHERAL VESSEL Left 01/01/2007        Social History     Socioeconomic History    Marital status: Single   Tobacco Use    Smoking status: Some Days     Current packs/day: 0.25     Average packs/day: 0.3 packs/day for 0.7 years (0.2 ttl pk-yrs)     Types: Cigarettes     Start date: 01/2024    Smokeless tobacco: Former    Tobacco comments:     He has a h/o smoking 1 ppd for 30 years.  He quit in January.   Substance and Sexual Activity    Alcohol use: Yes     Alcohol/week: 10.0 standard drinks of alcohol     Types: 10 Cans of beer per week     Comment: occassionally    Drug use: Not Currently    Sexual activity: Not Currently     Partners: Male     Birth control/protection: None        Family History   Problem Relation Name Age of Onset    Heart disease Mother      Peripheral vascular disease Mother      Parkinsonism Father      Lung disease Sister      Heart disease Brother Abel     Mental illness Brother Abel         Review of patient's allergies indicates:   Allergen Reactions    Pitavastatin      Other reaction(s): Bone pain    Sulfamethoxazole-trimethoprim  "Rash        Immunization History   Administered Date(s) Administered    COVID-19, MRNA, LN-S, PF (Pfizer) (Purple Cap) 08/24/2021, 09/14/2021    Hepatitis A, Adult 05/16/2016, 02/20/2017    Meningococcal Conjugate 08/30/2018    Meningococcal Conjugate (MCV4P) 08/30/2018, 12/20/2018    Pneumococcal Conjugate - 13 Valent 10/22/2015    Pneumococcal Conjugate - 7 Valent 10/08/2008    Pneumococcal Polysaccharide - 23 Valent 09/19/2013, 03/01/2018    Td (ADULT) 01/02/2009, 05/10/2023    Td - PF (ADULT) 01/02/2009        Review of Systems   Constitutional: Negative.    HENT: Negative.     Eyes: Negative.    Respiratory: Negative.     Cardiovascular: Negative.    Gastrointestinal: Negative.    Genitourinary: Negative.    Musculoskeletal: Negative.    Skin: Negative.    Neurological: Negative.    Endo/Heme/Allergies: Negative.    Psychiatric/Behavioral: Negative.     All other systems reviewed and are negative.         Objective:      /70 (BP Location: Left arm, Patient Position: Sitting, BP Method: Medium (Automatic))   Pulse (!) 53   Temp 97.8 °F (36.6 °C) (Oral)   Resp 12   Ht 5' 9" (1.753 m)   Wt 59.9 kg (132 lb 0.9 oz)   BMI 19.50 kg/m²      Physical Exam  Vitals reviewed.   Constitutional:       General: He is not in acute distress.     Appearance: Normal appearance. He is not toxic-appearing.   HENT:      Mouth/Throat:      Mouth: Mucous membranes are moist.      Pharynx: Oropharynx is clear.   Eyes:      Conjunctiva/sclera: Conjunctivae normal.   Cardiovascular:      Rate and Rhythm: Normal rate and regular rhythm.   Pulmonary:      Effort: Pulmonary effort is normal. No respiratory distress.      Breath sounds: Normal breath sounds.   Abdominal:      General: Abdomen is flat. Bowel sounds are normal.      Palpations: Abdomen is soft.   Musculoskeletal:         General: Normal range of motion.      Cervical back: Normal range of motion.   Lymphadenopathy:      Cervical: No cervical adenopathy.   Skin:     " General: Skin is warm and dry.   Neurological:      General: No focal deficit present.      Mental Status: He is alert and oriented to person, place, and time. Mental status is at baseline.   Psychiatric:         Mood and Affect: Mood normal.         Behavior: Behavior normal.          Labs:   Lab Results   Component Value Date    WBC 5.48 08/23/2024    HGB 15.6 08/23/2024    HCT 46.5 08/23/2024    .2 (H) 08/23/2024     08/23/2024       CMP  Sodium   Date Value Ref Range Status   08/23/2024 138 136 - 145 mmol/L Final   04/06/2022 134 (L) 136 - 145 mmol/L Final     Potassium   Date Value Ref Range Status   08/23/2024 3.8 3.5 - 5.1 mmol/L Final   04/06/2022 5.2 (H) 3.5 - 5.1 mmol/L Final     Chloride   Date Value Ref Range Status   08/23/2024 105 98 - 107 mmol/L Final   04/06/2022 102 100 - 109 mmol/L Final     CO2   Date Value Ref Range Status   08/23/2024 23 23 - 31 mmol/L Final     Carbon Dioxide   Date Value Ref Range Status   04/06/2022 27 22 - 33 mmol/L Final     Blood Urea Nitrogen   Date Value Ref Range Status   08/23/2024 12.6 8.4 - 25.7 mg/dL Final   04/06/2022 11 7 - 18 mg/dL Final     Creatinine   Date Value Ref Range Status   08/23/2024 0.96 0.73 - 1.18 mg/dL Final   04/06/2022 0.85 0.57 - 1.25 mg/dL Final     Calcium   Date Value Ref Range Status   08/23/2024 9.8 8.8 - 10.0 mg/dL Final   04/06/2022 9.2 8.8 - 10.6 mg/dL Final     Albumin   Date Value Ref Range Status   08/23/2024 4.1 3.4 - 4.8 g/dL Final     Bilirubin Total   Date Value Ref Range Status   08/23/2024 0.3 <=1.5 mg/dL Final     ALP   Date Value Ref Range Status   08/23/2024 102 40 - 150 unit/L Final     AST   Date Value Ref Range Status   08/23/2024 18 5 - 34 unit/L Final     ALT   Date Value Ref Range Status   08/23/2024 34 0 - 55 unit/L Final     Anion Gap   Date Value Ref Range Status   04/06/2022 5 (L) 8 - 16 mmol/L Final     eGFR   Date Value Ref Range Status   08/23/2024 >60 mL/min/1.73/m2 Final     Lab Results    Component Value Date    TSH 1.229 08/23/2024     Hep C Ab Interp   Date Value Ref Range Status   08/23/2024 Nonreactive Nonreactive Final     Syphilis Antibody   Date Value Ref Range Status   08/23/2024 Nonreactive Nonreactive, Equivocal Final     Cholesterol Total   Date Value Ref Range Status   08/23/2024 153 <=200 mg/dL Final     HDL Cholesterol   Date Value Ref Range Status   08/23/2024 30 (L) 35 - 60 mg/dL Final     Triglyceride   Date Value Ref Range Status   08/23/2024 463 (H) 34 - 140 mg/dL Final     Cholesterol/HDL Ratio   Date Value Ref Range Status   08/23/2024 5 0 - 5 Final     Very Low Density Lipoprotein   Date Value Ref Range Status   05/27/2024 30  Final     LDL Cholesterol   Date Value Ref Range Status   05/27/2024 77.00 50.00 - 140.00 mg/dL Final     Vitamin D   Date Value Ref Range Status   08/23/2024 35 30 - 80 ng/mL Final     Results for orders placed or performed in visit on 02/27/24   CD4 Lymphocytes   Result Value Ref Range    Patient Age 63     WBC Absolute 5,200 4,500 - 11,500 /mm3    Lymph Percent 35 28 - 48 %    Lymph Absolute 1,820 1,260 - 5,520 x10(3)/mcL    CD4 % 36.2 %    CD4 Absolute 659 589 - 1,505 unit/L    T Cell Interp       Normal absolute lymphocyte count with normal absolute CD4+ lymphocyte count.     Nhan Montana M.D.     Narrative    This test was developed and its performance characteristics determined by [facility]. It has not been cleared or approved by the US Food and Drug Administration. The FDA does not require this test to go through premarket FDA review. This test is used for clinical purposes. It should not be regarded as investigational or for research. This laboratory is certified under the Clinical Laboratory Improvement Amendments (CLIA) as qualified to perform high complexity clinical laboratory testing.     Results for orders placed or performed in visit on 08/23/24   HIV-1 RNA, Quantitative, PCR with Reflex to Genotype    Result Value Ref Range    HIV-1 RNA Detect/Quant, P <20 (A) Undetected copies/mL     Results for orders placed or performed in visit on 08/23/24   Quantiferon Gold TB   Result Value Ref Range    QuantiFERON-Tb Gold Plus Result Negative Negative    TB1 Ag minus Nil Result 0.09 IU/mL    TB2 Ag minus Nil Result 0.05 IU/mL    Mitogen minus Nil Result 9.09 IU/mL    Nil Result 0.03 IU/mL     No results found for this or any previous visit.  Results for orders placed or performed in visit on 08/23/24   Urinalysis   Result Value Ref Range    Color, UA Yellow Yellow, Light-Yellow, Dark Yellow, Lili, Straw    Appearance, UA Turbid (A) Clear    Specific Gravity, UA 1.037 (H) 1.005 - 1.030    pH, UA 5.5 5.0 - 8.5    Protein, UA 1+ (A) Negative    Glucose, UA Normal Negative, Normal    Ketones, UA Negative Negative    Blood, UA Trace (A) Negative    Bilirubin, UA Negative Negative    Urobilinogen, UA 1+ (A) 0.2, 1.0, Normal    Nitrites, UA Negative Negative    Leukocyte Esterase, UA Negative Negative    RBC, UA 0-5 None Seen, 0-2, 3-5, 0-5 /HPF    WBC, UA 0-5 None Seen, 0-2, 3-5, 0-5 /HPF    Bacteria, UA None Seen None Seen /HPF    Squamous Epithelial Cells, UA None Seen None Seen /HPF    Mucous, UA Few (A) None Seen /LPF    Hyaline Casts, UA None Seen None Seen /lpf    Amorphous Crystal, UA Few /uL       Imaging: Reviewed most recent relevant imaging studies available, notable results highlighted in this note    Medications:     Current Outpatient Medications   Medication Instructions    acyclovir (ZOVIRAX) 400 mg, Oral, 2 times daily    albuterol (PROVENTIL/VENTOLIN HFA) 90 mcg/actuation inhaler INHALE 2 PUFFS BY MOUTH EVERY 6 HOURS AS NEEDED FOR SHORTNESS OF BREATH OR WHEEZING    ALPRAZolam (XANAX) 1 MG tablet TAKE ONE TABLET BY MOUTH TWICE DAILY AS NEEDED FOR ANXIETY    amLODIPine (NORVASC) 2.5 mg, Oral    aspirin (ECOTRIN) 81 mg, Oral    ojewogmjk-fbnedrym-ihswrmw ala (BIKTARVY) -25 mg (25 kg or greater) 1 tablet,  Oral, Daily    carisoprodoL (SOMA) 350 mg, Oral, 2 times daily    clopidogreL (PLAVIX) 75 mg, Oral    desvenlafaxine 100 mg Tb24 1 tablet, Oral, Daily    desvenlafaxine succinate (PRISTIQ) 100 mg, Oral    esomeprazole (NEXIUM) 40 MG capsule TAKE 1 CAPSULE BY MOUTH ONCE DAILY    famotidine (PEPCID) 40 MG tablet TAKE 1 TABLET BY MOUTH AT BEDTIME    fluticasone propionate (FLONASE) 50 mcg/actuation nasal spray USE TWO SPRAYS IN EACH NOSTRIL ONCE DAILY    hydrocortisone 2.5 % cream Topical (Top), 2 times daily    losartan (COZAAR) 100 mg, Oral    mupirocin (BACTROBAN) 2 % ointment Topical (Top), 3 times daily    rosuvastatin (CRESTOR) 20 mg, Oral, Nightly       Assessment:       Problem List Items Addressed This Visit    None  Visit Diagnoses       HIV disease    -  Primary    Relevant Medications    yczchfjyj-lluqekqu-uqeznsz ala (BIKTARVY) -25 mg (25 kg or greater)    Other Relevant Orders    Comprehensive Metabolic Panel    HIV-1 RNA, Quantitative, PCR with Reflex to Genotype    CBC Auto Differential    CD4 T-Bryant Cells               Plan:      HIV disease  -     dlnjploiu-pibqyeff-xuefauw ala (BIKTARVY) -25 mg (25 kg or greater); Take 1 tablet by mouth once daily.  Dispense: 90 tablet; Refill: 1  -     Comprehensive Metabolic Panel; Future; Expected date: 02/01/2025  -     HIV-1 RNA, Quantitative, PCR with Reflex to Genotype; Future; Expected date: 02/01/2025  -     CBC Auto Differential; Future; Expected date: 02/01/2025  -     CD4 T-Bryant Cells; Future; Expected date: 02/01/2025  Adherence/sexual health counseling done.  Continue Biktarvy 1 tablet daily.   Repeat labs 1 wk prior to next visit.  RTC 6 months with Corrine.  Keep f/u with PCP as scheduled.

## 2024-09-09 PROBLEM — Z00.00 WELLNESS EXAMINATION: Status: RESOLVED | Noted: 2023-05-10 | Resolved: 2024-09-09

## 2024-09-12 ENCOUNTER — TELEPHONE (OUTPATIENT)
Dept: INFECTIOUS DISEASES | Facility: CLINIC | Age: 64
End: 2024-09-12
Payer: COMMERCIAL

## 2024-09-12 DIAGNOSIS — H04.129 DRY EYE: Primary | ICD-10-CM

## 2024-09-12 NOTE — TELEPHONE ENCOUNTER
----- Message from Kiley Mccoy sent at 9/12/2024 11:58 AM CDT -----  Pt guillermo Castle      Pt called stating he needs a referral for Eye Clinic.    Pt requesting a call back.    Pt number [phone]      Please advise

## 2024-09-12 NOTE — TELEPHONE ENCOUNTER
Patient phoned the eye clinic stating he is having trouble with his eyes and was told next available appt was 03/2025 but with a referral, a sooner appt would be available. Requests a referral for an eye exam. Appreciative of call back.

## 2024-09-24 ENCOUNTER — TELEPHONE (OUTPATIENT)
Dept: INTERNAL MEDICINE | Facility: CLINIC | Age: 64
End: 2024-09-24
Payer: COMMERCIAL

## 2024-09-24 DIAGNOSIS — M62.838 MUSCLE SPASM: ICD-10-CM

## 2024-09-24 RX ORDER — CARISOPRODOL 350 MG/1
350 TABLET ORAL 2 TIMES DAILY
Qty: 60 TABLET | Refills: 0 | Status: SHIPPED | OUTPATIENT
Start: 2024-09-24

## 2024-09-24 NOTE — TELEPHONE ENCOUNTER
----- Message from [facility] sent at 9/24/2024  9:26 AM CDT -----  Regarding: refill  .Type:  RX Refill Request    Who Called:  pt    Refill or New Rx: refill    RX Name and Strength: carisoprodoL (SOMA) 350 MG tablet 60 tablet 0 8/26/2024 - No  Sig - Route: Take 1 tablet (350 mg total) by mouth 2 (two) times daily      How is the patient currently taking it? (ex. 1XDay): 2 day    Is this a 30 day or 90 day RX: 60    Preferred Pharmacy with phone number:  [facility] [address]   Phone: [phone]  Fax: [phone]        Local or Mail Order: local    Ordering Provider: Ana    Would the patient rather a call back or a response via [facility]?  Jarad    Best Call Back Number: [phone]    Additional Information:  refill

## 2024-10-20 DIAGNOSIS — R06.2 WHEEZING: ICD-10-CM

## 2024-10-21 RX ORDER — ALBUTEROL SULFATE 90 UG/1
INHALANT RESPIRATORY (INHALATION)
Qty: 18 G | Refills: 6 | Status: SHIPPED | OUTPATIENT
Start: 2024-10-21

## 2024-10-22 DIAGNOSIS — M62.838 MUSCLE SPASM: ICD-10-CM

## 2024-10-22 NOTE — TELEPHONE ENCOUNTER
----- Message from Tech Rebecca sent at 10/22/2024  1:43 PM CDT -----  .Type:  RX Refill Request    Who Called:  pt    Refill or New Rx: refill    RX Name and Strength:     carisoprodoL (SOMA) 350 MG tablet 60 tablet 0 9/24/2024 - No  Sig - Route: Take 1 tablet (350 mg total) by mouth 2 (two) times daily      How is the patient currently taking it? (ex. 1XDay): 2 day    Is this a 30 day or 90 day RX: 30    Preferred Pharmacy with phone number: [facility] [address]   Phone: [phone]  Fax: [phone]        Local or Mail Order: local    Ordering Provider  Snow    Would the patient rather a call back or a response via [facility]?  Jarad    Best Call Back Number: [phone]    Additional Information:  refill

## 2024-10-24 RX ORDER — CARISOPRODOL 350 MG/1
350 TABLET ORAL 2 TIMES DAILY
Qty: 60 TABLET | Refills: 0 | Status: SHIPPED | OUTPATIENT
Start: 2024-10-24

## 2024-10-25 ENCOUNTER — TELEPHONE (OUTPATIENT)
Dept: INTERNAL MEDICINE | Facility: CLINIC | Age: 64
End: 2024-10-25
Payer: COMMERCIAL

## 2024-10-25 DIAGNOSIS — I10 PRIMARY HYPERTENSION: ICD-10-CM

## 2024-10-25 RX ORDER — AMLODIPINE BESYLATE 2.5 MG/1
2.5 TABLET ORAL DAILY
Qty: 30 TABLET | Refills: 11 | Status: SHIPPED | OUTPATIENT
Start: 2024-10-25

## 2024-11-12 DIAGNOSIS — I77.9 PERIPHERAL ARTERIAL OCCLUSIVE DISEASE: ICD-10-CM

## 2024-11-12 DIAGNOSIS — B00.9 HSV (HERPES SIMPLEX VIRUS) INFECTION: ICD-10-CM

## 2024-11-12 RX ORDER — ACYCLOVIR 400 MG/1
400 TABLET ORAL 2 TIMES DAILY
Qty: 180 TABLET | Refills: 1 | Status: SHIPPED | OUTPATIENT
Start: 2024-11-12

## 2024-11-12 RX ORDER — CLOPIDOGREL BISULFATE 75 MG/1
75 TABLET ORAL
Qty: 30 TABLET | Refills: 3 | Status: SHIPPED | OUTPATIENT
Start: 2024-11-12

## 2024-11-21 DIAGNOSIS — M62.838 MUSCLE SPASM: ICD-10-CM

## 2024-11-21 RX ORDER — CARISOPRODOL 350 MG/1
350 TABLET ORAL 2 TIMES DAILY
Qty: 60 TABLET | Refills: 0 | Status: SHIPPED | OUTPATIENT
Start: 2024-11-21

## 2024-11-21 NOTE — TELEPHONE ENCOUNTER
----- Message from Shanique sent at 11/21/2024  8:19 AM CST -----  .Type:  RX Refill Request    Who Called: pt  Refill or New Rx:refill   RX Name and Strength:carisoprodoL (SOMA) 350 MG tablet  How is the patient currently taking it? (ex. 1XDay):2/day  Is this a 30 day or 90 day RX:30  Preferred Pharmacy with phone number:[facility]  Local or Mail Order:Local  Ordering Provider:Ana  Would the patient rather a call back or a response via [facility]?   Best Call Back Number:[phone]   Additional Information: carisoprodoL (SOMA) 350 MG tablet

## 2024-12-13 ENCOUNTER — OFFICE VISIT (OUTPATIENT)
Dept: INTERNAL MEDICINE | Facility: CLINIC | Age: 64
End: 2024-12-13
Payer: COMMERCIAL

## 2024-12-13 VITALS
DIASTOLIC BLOOD PRESSURE: 70 MMHG | HEART RATE: 60 BPM | WEIGHT: 133.81 LBS | TEMPERATURE: 98 F | OXYGEN SATURATION: 100 % | SYSTOLIC BLOOD PRESSURE: 115 MMHG | BODY MASS INDEX: 19.82 KG/M2 | RESPIRATION RATE: 16 BRPM | HEIGHT: 69 IN

## 2024-12-13 DIAGNOSIS — Z12.5 PROSTATE CANCER SCREENING: ICD-10-CM

## 2024-12-13 DIAGNOSIS — Z12.11 COLON CANCER SCREENING: Primary | ICD-10-CM

## 2024-12-13 DIAGNOSIS — F41.8 MIXED ANXIETY DEPRESSIVE DISORDER: ICD-10-CM

## 2024-12-13 DIAGNOSIS — I10 PRIMARY HYPERTENSION: ICD-10-CM

## 2024-12-13 DIAGNOSIS — E78.5 HYPERLIPIDEMIA, UNSPECIFIED HYPERLIPIDEMIA TYPE: ICD-10-CM

## 2024-12-13 DIAGNOSIS — I77.9 PERIPHERAL ARTERIAL OCCLUSIVE DISEASE: ICD-10-CM

## 2024-12-13 NOTE — PROGRESS NOTES
Subjective:      Chief Complaint: Follow-up (6months f/u )      HPI:He is here for f/u htn, anxiety and depression, PAD.  He is struggling with trying to figure out how to pay his medical insurance.  His BP has been good at home.  No issues with the PAD.  He has a f/u scheduled for Feb 2025.  He walks 1.5 miles about 3 days per week.  He is anxious about recently spending a bunch of money on fillers for his face and then having complications from it.  And he has an insurance issue.  He is still taking the pristiq, and he takes the xanax twice daily.      He reports he drinks about 10-12 beers maybe once every other week.  He eats a lot of sweets.  Problem Noted   Degeneration of Intervertebral Disc of Lumbar Region 10/21/2022   Gastroesophageal Reflux Disease 10/21/2022   Hyperlipidemia 10/21/2022   Hypertension 10/21/2022   Hypogonadism in Male 10/21/2022   Mixed Anxiety Depressive Disorder 10/21/2022   Neuropathy 10/21/2022   Panic Attack 10/21/2022   Peripheral Arterial Occlusive Disease 10/21/2022    s/p stents/angioplasty right lower ext.had blockage in his left calf that was stented and then re-occluded.  Then received another opinion from Dr. Ramirez who rec exercise and conditioning and that has helped alot.  He has to walk a pretty long distance before it starts to cramp.     Sciatica 10/21/2022   Chronic Pain 10/21/2022   HIV Infection 6/27/2022    has been undetectable for 13-14 years     Wellness Examination (Resolved) 5/10/2023   Atherosclerosis of Native Artery of Both Lower Extremities With Intermittent Claudication (Resolved) 7/7/2020        The patient's Health Maintenance was reviewed and the following appears to be due:   Health Maintenance Due   Topic Date Due    PROSTATE-SPECIFIC ANTIGEN  06/20/2024    Colorectal Cancer Screening  10/19/2024       Past Medical History:  Past Medical History:   Diagnosis Date    Anxiety     Atherosclerosis of native artery of both lower extremities with  intermittent claudication 07/07/2020    Coronary artery disease     GERD (gastroesophageal reflux disease)     HIV infection     Hyperlipidemia     Hypertension      Review of patient's allergies indicates:   Allergen Reactions    Pitavastatin      Other reaction(s): Bone pain    Sulfamethoxazole-trimethoprim Rash     Current Outpatient Medications on File Prior to Visit   Medication Sig Dispense Refill    acyclovir (ZOVIRAX) 400 MG tablet TAKE 1 TABLET BY MOUTH 2 TIMES A  tablet 1    albuterol (PROVENTIL/VENTOLIN HFA) 90 mcg/actuation inhaler INHALE 2 PUFFS BY MOUTH EVERY 6 HOURS AS NEEDED FOR FOR SHORTNESS OF BREATH OR WHEEZING 18 g 6    ALPRAZolam (XANAX) 1 MG tablet TAKE ONE TABLET BY MOUTH TWICE DAILY AS NEEDED FOR ANXIETY 60 tablet 5    amLODIPine (NORVASC) 2.5 MG tablet Take 1 tablet (2.5 mg total) by mouth once daily. 30 tablet 11    aspirin (ECOTRIN) 81 MG EC tablet Take 81 mg by mouth once daily.      ujbceasqo-hfvbftmn-rekosft ala (BIKTARVY) -25 mg (25 kg or greater) Take 1 tablet by mouth once daily. 90 tablet 1    carisoprodoL (SOMA) 350 MG tablet Take 1 tablet (350 mg total) by mouth 2 (two) times daily. 60 tablet 0    clopidogreL (PLAVIX) 75 mg tablet TAKE ONE TABLET BY MOUTH ONCE DAILY 30 tablet 3    desvenlafaxine 100 mg Tb24 Take 1 tablet by mouth once daily. 90 tablet 3    desvenlafaxine succinate (PRISTIQ) 100 MG Tb24 Take 100 mg by mouth once daily.      esomeprazole (NEXIUM) 40 MG capsule TAKE 1 CAPSULE BY MOUTH ONCE DAILY 30 capsule 11    famotidine (PEPCID) 40 MG tablet TAKE 1 TABLET BY MOUTH AT BEDTIME 30 tablet 11    fluticasone propionate (FLONASE) 50 mcg/actuation nasal spray USE TWO SPRAYS IN EACH NOSTRIL ONCE DAILY 16 g 5    hydrocortisone 2.5 % cream Apply topically 2 (two) times daily. (Patient taking differently: Apply 2.5 % topically 1 (one) time if needed.) 28 g 0    losartan (COZAAR) 100 MG tablet TAKE ONE TABLET BY MOUTH ONCE DAILY 90 tablet 3    mupirocin  "(BACTROBAN) 2 % ointment Apply topically 3 (three) times daily. (Patient taking differently: Apply 2 g topically 1 (one) time if needed.) 15 g 0    rosuvastatin (CRESTOR) 20 MG tablet Take 1 tablet (20 mg total) by mouth every evening. 90 tablet 3     No current facility-administered medications on file prior to visit.       Review of Systems    Objective:   /70 (BP Location: Right arm, Patient Position: Sitting)   Pulse 60   Temp 98.3 °F (36.8 °C)   Resp 16   Ht 5' 9" (1.753 m)   Wt 60.7 kg (133 lb 12.8 oz)   SpO2 100%   BMI 19.76 kg/m²     Physical Exam  Constitutional:       General: He is not in acute distress.     Appearance: Normal appearance. He is not toxic-appearing.   HENT:      Head: Normocephalic and atraumatic.   Eyes:      General: No scleral icterus.  Neck:      Vascular: No carotid bruit.   Cardiovascular:      Rate and Rhythm: Normal rate and regular rhythm.      Pulses: Normal pulses.      Heart sounds: Normal heart sounds.   Pulmonary:      Effort: Pulmonary effort is normal.      Breath sounds: Normal breath sounds.   Skin:     General: Skin is warm and dry.   Neurological:      General: No focal deficit present.      Mental Status: He is alert and oriented to person, place, and time. Mental status is at baseline.      Gait: Gait is intact.   Psychiatric:         Mood and Affect: Mood normal.         Behavior: Behavior normal.         Thought Content: Thought content normal.         Judgment: Judgment normal.       Assessment and Plan:     Peripheral arterial occlusive disease  Stable, followed by Ashley Soto, on plavix.    Mixed anxiety depressive disorder  Stable. Continue pristiq and xana bid.    Hypertension  Controlled. Continue losartan, amlodipine.    Hyperlipidemia  LDL Controlled.  Continue crestor. -- Work on lowering the simple carbs in diet.      Follow up in about 6 months (around 6/13/2025).       [unfilled]  Orders Placed This Encounter   Procedures    Cologuard " Screening (Multitarget Stool DNA)     Standing Status:   Future     Number of Occurrences:   1     Standing Expiration Date:   3/13/2026     Order Specific Question:   Send normal result to authorizing provider's In Basket if patient is active on MyChart:     Answer:   Yes    PSA, Screening     Standing Status:   Future     Standing Expiration Date:   6/13/2025     Order Specific Question:   Send normal result to authorizing provider's In Basket if patient is active on MyChart:     Answer:   Yes

## 2024-12-16 DIAGNOSIS — F41.8 MIXED ANXIETY DEPRESSIVE DISORDER: Primary | ICD-10-CM

## 2024-12-16 DIAGNOSIS — I10 PRIMARY HYPERTENSION: ICD-10-CM

## 2024-12-16 RX ORDER — ALPRAZOLAM 1 MG/1
1 TABLET ORAL 2 TIMES DAILY
Qty: 60 TABLET | Refills: 5 | Status: SHIPPED | OUTPATIENT
Start: 2024-12-16

## 2024-12-16 RX ORDER — DESVENLAFAXINE 100 MG/1
100 TABLET, EXTENDED RELEASE ORAL
Qty: 90 TABLET | Refills: 3 | Status: SHIPPED | OUTPATIENT
Start: 2024-12-16

## 2024-12-16 RX ORDER — LOSARTAN POTASSIUM 100 MG/1
100 TABLET ORAL
Qty: 90 TABLET | Refills: 3 | Status: SHIPPED | OUTPATIENT
Start: 2024-12-16

## 2024-12-16 NOTE — TELEPHONE ENCOUNTER
----- Message from Yesica sent at 12/16/2024  8:21 AM CST -----  Who Called: Reyes Ren    Patient is returning phone call    Who Left Message for Patient:  Does the patient know what this is regarding?:meds      Preferred Method of Contact: Phone Call  Patient's Preferred Phone Number on File: [phone]   Best Call Back Number, if different:  Additional Information: pt called to speak with nurse regarding xanax not being filled [facility] advise  [facility] [address]

## 2024-12-20 ENCOUNTER — LAB VISIT (OUTPATIENT)
Dept: LAB | Facility: HOSPITAL | Age: 64
End: 2024-12-20
Attending: INTERNAL MEDICINE
Payer: COMMERCIAL

## 2024-12-20 DIAGNOSIS — Z12.5 PROSTATE CANCER SCREENING: ICD-10-CM

## 2024-12-20 LAB — PSA SERPL-MCNC: 1.07 NG/ML

## 2024-12-20 PROCEDURE — 84153 ASSAY OF PSA TOTAL: CPT

## 2024-12-20 PROCEDURE — 36415 COLL VENOUS BLD VENIPUNCTURE: CPT

## 2024-12-23 ENCOUNTER — TELEPHONE (OUTPATIENT)
Dept: INTERNAL MEDICINE | Facility: CLINIC | Age: 64
End: 2024-12-23
Payer: COMMERCIAL

## 2024-12-23 DIAGNOSIS — M62.838 MUSCLE SPASM: ICD-10-CM

## 2024-12-23 RX ORDER — CARISOPRODOL 350 MG/1
350 TABLET ORAL 2 TIMES DAILY
Qty: 60 TABLET | Refills: 0 | Status: CANCELLED | OUTPATIENT
Start: 2024-12-23

## 2024-12-23 NOTE — TELEPHONE ENCOUNTER
----- Message from Wilfredo sent at 12/23/2024 10:29 AM CST -----  .Type:  RX Refill Request    Who Called: Reyes   Refill or New Rx: Refill   RX Name and Strength: carisoprodoL (SOMA) 350 MG tablet  How is the patient currently taking it? (ex. 1XDay): 1x day   Is this a 30 day or 90 day RX:  [facility]   Local or Mail Order: Local   Ordering Provider: Ana   Would the patient rather a call back or a response via [facility]? Call back   Best Call Back Number: [phone]  Additional Information: Please call him back with any questions. Thanks,

## 2024-12-26 DIAGNOSIS — M62.838 MUSCLE SPASM: ICD-10-CM

## 2024-12-26 RX ORDER — CARISOPRODOL 350 MG/1
350 TABLET ORAL 2 TIMES DAILY
Qty: 60 TABLET | Refills: 0 | Status: SHIPPED | OUTPATIENT
Start: 2024-12-26

## 2024-12-26 NOTE — TELEPHONE ENCOUNTER
----- Message from Shanique sent at 12/26/2024  8:50 AM CST -----  .Type:  RX Refill Request    Who Called: pt  Refill or New Rx:refill  RX Name and Strength:carisoprodoL (SOMA)   How is the patient currently taking it? (ex. 1XDay):2/day  Is this a 30 day or 90 day RX:30  Preferred Pharmacy with phone number:[facility]  Local or Mail Order:local  Ordering Provider:david  Would the patient rather a call back or a response via [facility]?   Best Call Back Number:[phone]  Additional Information: carisoprodoL (SOMA)

## 2025-01-03 ENCOUNTER — OFFICE VISIT (OUTPATIENT)
Dept: OPHTHALMOLOGY | Facility: CLINIC | Age: 65
End: 2025-01-03
Payer: COMMERCIAL

## 2025-01-03 VITALS — BODY MASS INDEX: 19.92 KG/M2 | HEIGHT: 69 IN | WEIGHT: 134.5 LBS

## 2025-01-03 DIAGNOSIS — Z21 ASYMPTOMATIC HIV INFECTION, WITH NO HISTORY OF HIV-RELATED ILLNESS: Primary | ICD-10-CM

## 2025-01-03 DIAGNOSIS — H04.129 DRY EYE: ICD-10-CM

## 2025-01-03 PROCEDURE — 99214 OFFICE O/P EST MOD 30 MIN: CPT | Mod: PBBFAC,PN | Performed by: STUDENT IN AN ORGANIZED HEALTH CARE EDUCATION/TRAINING PROGRAM

## 2025-01-03 NOTE — PROGRESS NOTES
Assessment /Plan     For exam results, see Encounter Report.    Dry eye  -     Ambulatory referral/consult to Ophthalmology      HIV without retinopathy   - Last CD4 948 with VL <20 on 8/23/24  - Cont HAART  - Cont follow up with ID   - No retinopathy on DFE 1/3/25  - Fundus photos obtained 1/3/25  - Monitor annually (next due Jan 2026)     2. PSK, both eyes   - OTC readers    RTC 1 year annual DFE and fundus photos

## 2025-01-24 DIAGNOSIS — M62.838 MUSCLE SPASM: ICD-10-CM

## 2025-01-24 RX ORDER — CARISOPRODOL 350 MG/1
350 TABLET ORAL 2 TIMES DAILY
Qty: 60 TABLET | Refills: 0 | Status: SHIPPED | OUTPATIENT
Start: 2025-01-24

## 2025-01-24 NOTE — TELEPHONE ENCOUNTER
----- Message from Claudia sent at 1/23/2025 11:20 AM CST -----  Who Called: Reyes Ren    Refill or New Rx:Refill  RX Name and Strength:carisoprodoL (SOMA) 350 MG tablet  How is the patient currently taking it? (ex. 1XDay):  Is this a 30 day or 90 day RX:  Local or Mail Order:  List of preferred pharmacies on file (remove unneeded): [unfilled]  If different Pharmacy is requested, enter Pharmacy information here including location and phone number: [facility] [address]       Ordering Provider        Patient's Preferred Phone Number on File: [phone]   Best Call Back Number, if different:  Additional Information:

## 2025-02-24 ENCOUNTER — LAB VISIT (OUTPATIENT)
Dept: LAB | Facility: HOSPITAL | Age: 65
End: 2025-02-24
Attending: NURSE PRACTITIONER
Payer: MEDICARE

## 2025-02-24 DIAGNOSIS — B20 HIV DISEASE: ICD-10-CM

## 2025-02-24 LAB
ALBUMIN SERPL-MCNC: 4.2 G/DL (ref 3.4–4.8)
ALBUMIN/GLOB SERPL: 1.2 RATIO (ref 1.1–2)
ALP SERPL-CCNC: 89 UNIT/L (ref 40–150)
ALT SERPL-CCNC: 23 UNIT/L (ref 0–55)
ANION GAP SERPL CALC-SCNC: 8 MEQ/L
AST SERPL-CCNC: 14 UNIT/L (ref 5–34)
BASOPHILS # BLD AUTO: 0.03 X10(3)/MCL
BASOPHILS NFR BLD AUTO: 0.5 %
BILIRUB SERPL-MCNC: 0.5 MG/DL
BUN SERPL-MCNC: 17.4 MG/DL (ref 8.4–25.7)
CALCIUM SERPL-MCNC: 9.6 MG/DL (ref 8.8–10)
CHLORIDE SERPL-SCNC: 104 MMOL/L (ref 98–107)
CO2 SERPL-SCNC: 25 MMOL/L (ref 23–31)
CREAT SERPL-MCNC: 0.78 MG/DL (ref 0.72–1.25)
CREAT/UREA NIT SERPL: 22
EOSINOPHIL # BLD AUTO: 0.24 X10(3)/MCL (ref 0–0.9)
EOSINOPHIL NFR BLD AUTO: 3.9 %
ERYTHROCYTE [DISTWIDTH] IN BLOOD BY AUTOMATED COUNT: 11.8 % (ref 11.5–17)
GFR SERPLBLD CREATININE-BSD FMLA CKD-EPI: >60 ML/MIN/1.73/M2
GLOBULIN SER-MCNC: 3.5 GM/DL (ref 2.4–3.5)
GLUCOSE SERPL-MCNC: 105 MG/DL (ref 82–115)
HCT VFR BLD AUTO: 47.5 % (ref 42–52)
HGB BLD-MCNC: 16 G/DL (ref 14–18)
IMM GRANULOCYTES # BLD AUTO: 0.01 X10(3)/MCL (ref 0–0.04)
IMM GRANULOCYTES NFR BLD AUTO: 0.2 %
LYMPHOCYTES # BLD AUTO: 2.77 X10(3)/MCL (ref 0.6–4.6)
LYMPHOCYTES NFR BLD AUTO: 45.6 %
MCH RBC QN AUTO: 33 PG (ref 27–31)
MCHC RBC AUTO-ENTMCNC: 33.7 G/DL (ref 33–36)
MCV RBC AUTO: 97.9 FL (ref 80–94)
MONOCYTES # BLD AUTO: 0.6 X10(3)/MCL (ref 0.1–1.3)
MONOCYTES NFR BLD AUTO: 9.9 %
NEUTROPHILS # BLD AUTO: 2.43 X10(3)/MCL (ref 2.1–9.2)
NEUTROPHILS NFR BLD AUTO: 39.9 %
NRBC BLD AUTO-RTO: 0 %
PLATELET # BLD AUTO: 215 X10(3)/MCL (ref 130–400)
PMV BLD AUTO: 9.6 FL (ref 7.4–10.4)
POTASSIUM SERPL-SCNC: 4.2 MMOL/L (ref 3.5–5.1)
PROT SERPL-MCNC: 7.7 GM/DL (ref 5.8–7.6)
RBC # BLD AUTO: 4.85 X10(6)/MCL (ref 4.7–6.1)
SODIUM SERPL-SCNC: 137 MMOL/L (ref 136–145)
WBC # BLD AUTO: 6.08 X10(3)/MCL (ref 4.5–11.5)

## 2025-02-24 PROCEDURE — 86360 T CELL ABSOLUTE COUNT/RATIO: CPT

## 2025-02-24 PROCEDURE — 85025 COMPLETE CBC W/AUTO DIFF WBC: CPT

## 2025-02-24 PROCEDURE — 87536 HIV-1 QUANT&REVRSE TRNSCRPJ: CPT

## 2025-02-24 PROCEDURE — 36415 COLL VENOUS BLD VENIPUNCTURE: CPT

## 2025-02-24 PROCEDURE — 86361 T CELL ABSOLUTE COUNT: CPT

## 2025-02-24 PROCEDURE — 80053 COMPREHEN METABOLIC PANEL: CPT

## 2025-02-26 DIAGNOSIS — M62.838 MUSCLE SPASM: ICD-10-CM

## 2025-02-26 LAB
AGE: 65
CD3+CD4+ CELLS # SPEC: 878 UNIT/L (ref 589–1505)
CD3+CD4+ CELLS NFR BLD: 31.4 %
HIV1 RNA # PLAS NAA DL=20: 38 COPIES/ML
LYMPHOCYTES # BLD AUTO: 2796.8 X10(3)/MCL (ref 1260–5520)
LYMPHOCYTES NFR LN MANUAL: 46 % (ref 28–48)
LYMPHOMA - T-CELL MARKERS SPEC-IMP: NORMAL
WBC # BLD AUTO: 6080 /MM3 (ref 4500–11500)

## 2025-02-26 RX ORDER — CARISOPRODOL 350 MG/1
350 TABLET ORAL 2 TIMES DAILY
Qty: 60 TABLET | Refills: 0 | Status: SHIPPED | OUTPATIENT
Start: 2025-02-26

## 2025-02-26 NOTE — TELEPHONE ENCOUNTER
----- Message from Mable sent at 2/26/2025  9:44 AM CST -----  Regarding: REFILL  Who Called: Reyes RenRefill or New Rx:RefillRX Name and Strength: carisoprodoL (SOMA) 350 MG tabletHow is the patient currently taking it? (ex. 1XDay): 2x dayIs this a 30 day or 90 day RX:Local or Mail Order:List of preferred pharmacies on file (remove unneeded): [unfilled]If different Pharmacy is requested, enter Pharmacy information here including location and phone number:  Ordering Provider:Preferred Method of Contact: Phone CallPatient's Preferred Phone Number on File: [phone] Best Call Back Number, if different:Additional Information:

## 2025-02-28 ENCOUNTER — OFFICE VISIT (OUTPATIENT)
Dept: INFECTIOUS DISEASES | Facility: CLINIC | Age: 65
End: 2025-02-28
Payer: COMMERCIAL

## 2025-02-28 VITALS
TEMPERATURE: 98 F | HEART RATE: 48 BPM | SYSTOLIC BLOOD PRESSURE: 130 MMHG | RESPIRATION RATE: 12 BRPM | WEIGHT: 132.38 LBS | BODY MASS INDEX: 19.61 KG/M2 | DIASTOLIC BLOOD PRESSURE: 68 MMHG | HEIGHT: 69 IN

## 2025-02-28 DIAGNOSIS — Z21 ASYMPTOMATIC HIV INFECTION, WITH NO HISTORY OF HIV-RELATED ILLNESS: Primary | ICD-10-CM

## 2025-02-28 PROCEDURE — 99214 OFFICE O/P EST MOD 30 MIN: CPT | Mod: PBBFAC | Performed by: NURSE PRACTITIONER

## 2025-02-28 RX ORDER — BICTEGRAVIR SODIUM, EMTRICITABINE, AND TENOFOVIR ALAFENAMIDE FUMARATE 50; 200; 25 MG/1; MG/1; MG/1
1 TABLET ORAL DAILY
Qty: 90 TABLET | Refills: 1 | Status: CANCELLED | OUTPATIENT
Start: 2025-02-28

## 2025-02-28 RX ORDER — BICTEGRAVIR SODIUM, EMTRICITABINE, AND TENOFOVIR ALAFENAMIDE FUMARATE 50; 200; 25 MG/1; MG/1; MG/1
1 TABLET ORAL DAILY
Qty: 90 TABLET | Refills: 1 | Status: SHIPPED | OUTPATIENT
Start: 2025-02-28

## 2025-02-28 NOTE — PROGRESS NOTES
Patient ID: Reyes Ren 64 y.o.     Chief Complaint:   Chief Complaint   Patient presents with    Followup HIV     Denies problems        HPI:    2/28/25  Erik is a 65 yo WM presenting today for HIV f/u visit.  He is virally suppressed on Biktarvy and tolerating it well. Labs 2/24/25 VL 38, CD4 878. He remains in care with Dr. Perez for PCP management of comorbidities. He declines recommended vaccinations as this time, requests to defer PCV 20 to next visit.  He is feeling well overall and has no concerns or questions today.     8/29/24  Erik is a 62 yo WM here today for HIV f/u visit.  He takes Biktarvy daily and is tolerating it well. Labs 8/24 VL <20, Cd4 948, RPR NR. He tells me that he did go ahead and get Sculptra injections done with Dr. Espino. He states that he had 12 syringes injected. He experienced right facial drooping for at least 6 weeks after the injections (mixed with local anesthesia) were given. He is considering moving forward with fillers or more Sculptra but would select another office with more experience using Sculptra (Dr. Perea).  Encouraged him to explore the option for improved quality of life. Will consider. All questions answered & concerns addressed.     2/29/24  Erik is a 62 yo WM presenting today for HIV f/u visit.  He is taking Biktarvy daily with viral suppression.  He is tolerating it well and initial side effects have subsided. Labs 2/24 VL UD, Cd4 659.  He is not sexually active, denies need for screenings. He tells me that he has been off acyclovir for 8 months now, no longer needing suppressive therapy. Will resume on PRN basis. He tells me that he will go to the health unit for Shingrix vaccination.  Has sought evaluation by Dr. Espino for facial fillers s/t loss of adiposity s/t prior ART toxicities. The initial treatment plan was denied by insurance, but Dr. Espino's office is appealing. He is hoping to hear back in next couple of weeks regarding same. He is hopeful  to be able resume social outing with friends, as he has been very self conscious over the past 3-4 years regarding same. All questions answered & concerns addressed.           Past Medical History:   Diagnosis Date    Anxiety     Atherosclerosis of native artery of both lower extremities with intermittent claudication 07/07/2020    Coronary artery disease     GERD (gastroesophageal reflux disease)     HIV infection     Hyperlipidemia     Hypertension         Past Surgical History:   Procedure Laterality Date    CARDIAC CATHETERIZATION      INSERTION OF STENT INTO PERIPHERAL VESSEL Right     INSERTION OF STENT INTO PERIPHERAL VESSEL Left 01/01/2007        Social History     Socioeconomic History    Marital status: Single   Tobacco Use    Smoking status: Some Days     Current packs/day: 0.25     Average packs/day: 0.3 packs/day for 1.2 years (0.3 ttl pk-yrs)     Types: Cigarettes     Start date: 01/2024    Smokeless tobacco: Former    Tobacco comments:     He has a h/o smoking 1 ppd for 30 years.  He quit in January.   Substance and Sexual Activity    Alcohol use: Yes     Alcohol/week: 10.0 standard drinks of alcohol     Types: 10 Cans of beer per week     Comment: occassionally    Drug use: Not Currently    Sexual activity: Not Currently     Partners: Male     Birth control/protection: None     Social Drivers of Health     Financial Resource Strain: Low Risk  (12/13/2024)    Overall Financial Resource Strain (CARDIA)     Difficulty of Paying Living Expenses: Not hard at all   Food Insecurity: No Food Insecurity (12/13/2024)    Hunger Vital Sign     Worried About Running Out of Food in the Last Year: Never true     Ran Out of Food in the Last Year: Never true   Transportation Needs: Patient Declined (12/13/2024)    TRANSPORTATION NEEDS     Transportation : Patient declined   Physical Activity: Insufficiently Active (12/13/2024)    Exercise Vital Sign     Days of Exercise per Week: 3 days     Minutes of Exercise per  "Session: 40 min   Stress: No Stress Concern Present (12/13/2024)    [employer] [place] [employer] - Occupational Stress Questionnaire     Feeling of Stress : Only a little   Housing Stability: Unknown (12/13/2024)    Housing Stability Vital Sign     Unable to Pay for Housing in the Last Year: No     Homeless in the Last Year: No        Family History   Problem Relation Name Age of Onset    Heart disease Mother      Peripheral vascular disease Mother      Parkinsonism Father      Lung disease Sister      Heart disease Brother Abel     Mental illness Brother Abel         Review of patient's allergies indicates:   Allergen Reactions    Pitavastatin      Other reaction(s): Bone pain    Sulfamethoxazole-trimethoprim Rash        Immunization History   Administered Date(s) Administered    COVID-19, MRNA, LN-S, PF (Pfizer) (Purple Cap) 08/24/2021, 09/14/2021    Hepatitis A, Adult 05/16/2016, 02/20/2017    Meningococcal Conjugate 08/30/2018    Meningococcal Conjugate (MCV4P) 08/30/2018, 12/20/2018    Pneumococcal Conjugate - 13 Valent 10/22/2015    Pneumococcal Conjugate - 7 Valent 10/08/2008    Pneumococcal Polysaccharide - 23 Valent 09/19/2013, 03/01/2018    Td (ADULT) 01/02/2009, 05/10/2023    Td - PF (ADULT) 01/02/2009        Review of Systems   Constitutional: Negative.    HENT: Negative.     Eyes: Negative.    Respiratory: Negative.     Cardiovascular: Negative.    Gastrointestinal: Negative.    Genitourinary: Negative.    Musculoskeletal: Negative.    Skin: Negative.    Neurological: Negative.    Endo/Heme/Allergies: Negative.    Psychiatric/Behavioral: Negative.     All other systems reviewed and are negative.         Objective:      /68 (BP Location: Left arm, Patient Position: Sitting)   Pulse (!) 48   Temp 97.6 °F (36.4 °C) (Oral)   Resp 12   Ht 5' 9" (1.753 m)   Wt 60.1 kg (132 lb 6.2 oz)   BMI 19.55 kg/m²      Physical Exam  Vitals reviewed.   Constitutional:       General: He is not in " acute distress.     Appearance: Normal appearance. He is not toxic-appearing.   HENT:      Mouth/Throat:      Mouth: Mucous membranes are moist.      Pharynx: Oropharynx is clear.   Eyes:      Conjunctiva/sclera: Conjunctivae normal.   Cardiovascular:      Rate and Rhythm: Normal rate and regular rhythm.   Pulmonary:      Effort: Pulmonary effort is normal. No respiratory distress.      Breath sounds: Normal breath sounds.   Abdominal:      General: Abdomen is flat. Bowel sounds are normal.      Palpations: Abdomen is soft.   Musculoskeletal:         General: Normal range of motion.      Cervical back: Normal range of motion.   Lymphadenopathy:      Cervical: No cervical adenopathy.   Skin:     General: Skin is warm and dry.   Neurological:      General: No focal deficit present.      Mental Status: He is alert and oriented to person, place, and time. Mental status is at baseline.   Psychiatric:         Mood and Affect: Mood normal.         Behavior: Behavior normal.          Labs:   Lab Results   Component Value Date    WBC 6.08 02/24/2025    HGB 16.0 02/24/2025    HCT 47.5 02/24/2025    MCV 97.9 (H) 02/24/2025     02/24/2025       CMP  Sodium   Date Value Ref Range Status   02/24/2025 137 136 - 145 mmol/L Final   04/06/2022 134 (L) 136 - 145 mmol/L Final     Potassium   Date Value Ref Range Status   02/24/2025 4.2 3.5 - 5.1 mmol/L Final   04/06/2022 5.2 (H) 3.5 - 5.1 mmol/L Final     Chloride   Date Value Ref Range Status   02/24/2025 104 98 - 107 mmol/L Final   04/06/2022 102 100 - 109 mmol/L Final     CO2   Date Value Ref Range Status   02/24/2025 25 23 - 31 mmol/L Final     Carbon Dioxide   Date Value Ref Range Status   04/06/2022 27 22 - 33 mmol/L Final     Blood Urea Nitrogen   Date Value Ref Range Status   02/24/2025 17.4 8.4 - 25.7 mg/dL Final   04/06/2022 11 7 - 18 mg/dL Final     Creatinine   Date Value Ref Range Status   02/24/2025 0.78 0.72 - 1.25 mg/dL Final   04/06/2022 0.85 0.57 - 1.25 mg/dL  Final     Calcium   Date Value Ref Range Status   02/24/2025 9.6 8.8 - 10.0 mg/dL Final   04/06/2022 9.2 8.8 - 10.6 mg/dL Final     Albumin   Date Value Ref Range Status   02/24/2025 4.2 3.4 - 4.8 g/dL Final     Bilirubin Total   Date Value Ref Range Status   02/24/2025 0.5 <=1.5 mg/dL Final     ALP   Date Value Ref Range Status   02/24/2025 89 40 - 150 unit/L Final     AST   Date Value Ref Range Status   02/24/2025 14 5 - 34 unit/L Final     ALT   Date Value Ref Range Status   02/24/2025 23 0 - 55 unit/L Final     Anion Gap   Date Value Ref Range Status   04/06/2022 5 (L) 8 - 16 mmol/L Final     eGFR   Date Value Ref Range Status   02/24/2025 >60 mL/min/1.73/m2 Final     Comment:     Estimated GFR calculated using the CKD-EPI creatinine (2021) equation.     Lab Results   Component Value Date    TSH 1.229 08/23/2024     Hep C Ab Interp   Date Value Ref Range Status   08/23/2024 Nonreactive Nonreactive Final     Syphilis Antibody   Date Value Ref Range Status   08/23/2024 Nonreactive Nonreactive, Equivocal Final     Cholesterol Total   Date Value Ref Range Status   08/23/2024 153 <=200 mg/dL Final     HDL Cholesterol   Date Value Ref Range Status   08/23/2024 30 (L) 35 - 60 mg/dL Final     Triglyceride   Date Value Ref Range Status   08/23/2024 463 (H) 34 - 140 mg/dL Final     Cholesterol/HDL Ratio   Date Value Ref Range Status   08/23/2024 5 0 - 5 Final     Very Low Density Lipoprotein   Date Value Ref Range Status   05/27/2024 30  Final     LDL Cholesterol   Date Value Ref Range Status   05/27/2024 77.00 50.00 - 140.00 mg/dL Final     Vitamin D   Date Value Ref Range Status   08/23/2024 35 30 - 80 ng/mL Final     Results for orders placed or performed in visit on 02/24/25   CD4 Lymphocytes   Result Value Ref Range    Patient Age 64     WBC Absolute 6,080 4,500 - 11,500 /mm3    Lymph Percent 46 28 - 48 %    Lymph Absolute 2,796.8 1,260 - 5,520 x10(3)/mcL    CD4 % 31.4 %    CD4 Absolute 878 589 - 1,505 unit/L    T  Cell Interp       Normal absolute lymphocyte count with normal absolute CD4+ lymphocyte count.      Narrative    This test was developed and its performance characteristics determined by [facility]. It has not been cleared or approved by the US Food and Drug Administration. The FDA does not require this test to go through premarket FDA review. This test is used for clinical purposes. It should not be regarded as investigational or for research. This laboratory is certified under the Clinical Laboratory Improvement Amendments (CLIA) as qualified to perform high complexity clinical laboratory testing.     Results for orders placed or performed in visit on 02/24/25   HIV-1 RNA, Quantitative, PCR with Reflex to Genotype   Result Value Ref Range    HIV-1 RNA Detect/Quant, P 38 (A) Undetected copies/mL     Results for orders placed or performed in visit on 08/23/24   Quantiferon Gold TB   Result Value Ref Range    QuantiFERON-Tb Gold Plus Result Negative Negative    TB1 Ag minus Nil Result 0.09 IU/mL    TB2 Ag minus Nil Result 0.05 IU/mL    Mitogen minus Nil Result 9.09 IU/mL    Nil Result 0.03 IU/mL     No results found for this or any previous visit.  Results for orders placed or performed in visit on 08/23/24   Urinalysis   Result Value Ref Range    Color, UA Yellow Yellow, Light-Yellow, Dark Yellow, Lili, Straw    Appearance, UA Turbid (A) Clear    Specific Gravity, UA 1.037 (H) 1.005 - 1.030    pH, UA 5.5 5.0 - 8.5    Protein, UA 1+ (A) Negative    Glucose, UA Normal Negative, Normal    Ketones, UA Negative Negative    Blood, UA Trace (A) Negative    Bilirubin, UA Negative Negative    Urobilinogen, UA 1+ (A) 0.2, 1.0, Normal    Nitrites, UA Negative Negative    Leukocyte Esterase, UA Negative Negative    RBC, UA 0-5 None Seen, 0-2, 3-5, 0-5 /HPF    WBC, UA 0-5 None Seen, 0-2, 3-5, 0-5 /HPF    Bacteria, UA None Seen None Seen /HPF    Squamous Epithelial Cells, UA None Seen None Seen /HPF     Mucous, UA Few (A) None Seen /LPF    Hyaline Casts, UA None Seen None Seen /lpf    Amorphous Crystal, UA Few /uL       Imaging: Reviewed most recent relevant imaging studies available, notable results highlighted in this note    Medications:     Current Outpatient Medications   Medication Instructions    acyclovir (ZOVIRAX) 400 mg, Oral, 2 times daily    albuterol (PROVENTIL/VENTOLIN HFA) 90 mcg/actuation inhaler INHALE 2 PUFFS BY MOUTH EVERY 6 HOURS AS NEEDED FOR FOR SHORTNESS OF BREATH OR WHEEZING    ALPRAZolam (XANAX) 1 mg, Oral, 2 times daily    amLODIPine (NORVASC) 2.5 mg, Oral, Daily    aspirin (ECOTRIN) 81 mg, Daily    cevieatka-wyhgilto-fstfcqw ala (BIKTARVY) -25 mg (25 kg or greater) 1 tablet, Oral, Daily    carisoprodoL (SOMA) 350 mg, Oral, 2 times daily    clopidogreL (PLAVIX) 75 mg, Oral    desvenlafaxine succinate (PRISTIQ) 100 mg, Oral    esomeprazole (NEXIUM) 40 MG capsule TAKE 1 CAPSULE BY MOUTH ONCE DAILY    famotidine (PEPCID) 40 MG tablet TAKE 1 TABLET BY MOUTH AT BEDTIME    fluticasone propionate (FLONASE) 50 mcg/actuation nasal spray USE TWO SPRAYS IN EACH NOSTRIL ONCE DAILY    hydrocortisone 2.5 % cream Topical (Top), 2 times daily    losartan (COZAAR) 100 mg, Oral    mupirocin (BACTROBAN) 2 % ointment Topical (Top), 3 times daily    rosuvastatin (CRESTOR) 20 mg, Oral, Nightly       Assessment:       Problem List Items Addressed This Visit       HIV infection - Primary    Relevant Medications    mzauogwzt-ssgddmur-vfjshem ala (BIKTARVY) -25 mg (25 kg or greater)    Other Relevant Orders    Quantiferon Gold TB    Hemoglobin A1C    Hepatitis C Antibody    Vitamin D    TSH    Lipid Panel    SYPHILIS ANTIBODY (WITH REFLEX RPR)    Chlamydia/GC, PCR    Comprehensive Metabolic Panel    CBC Auto Differential    CD4 Lymphocytes    HIV-1 RNA, Quantitative, PCR with Reflex to Genotype    Hepatitis A antibody, IgG    Hepatitis B Surface Ab, Qualitative    Urinalysis, Reflex to Urine Culture           Plan:      Asymptomatic HIV infection, with no history of HIV-related illness  -     xkywoovkp-jsjgvakc-rpaxntp ala (BIKTARVY) -25 mg (25 kg or greater); Take 1 tablet by mouth once daily.  Dispense: 90 tablet; Refill: 1  -     Quantiferon Gold TB; Future; Expected date: 08/01/2025  -     Hemoglobin A1C; Future; Expected date: 08/01/2025  -     Hepatitis C Antibody; Future; Expected date: 08/01/2025  -     Vitamin D; Future; Expected date: 08/01/2025  -     TSH; Future; Expected date: 08/01/2025  -     Lipid Panel; Future; Expected date: 08/01/2025  -     SYPHILIS ANTIBODY (WITH REFLEX RPR); Future; Expected date: 08/01/2025  -     Chlamydia/GC, PCR; Future; Expected date: 08/01/2025  -     Comprehensive Metabolic Panel; Future; Expected date: 08/01/2025  -     CBC Auto Differential; Future; Expected date: 08/01/2025  -     CD4 Lymphocytes; Future; Expected date: 08/01/2025  -     HIV-1 RNA, Quantitative, PCR with Reflex to Genotype; Future; Expected date: 08/01/2025  -     Hepatitis A antibody, IgG; Future; Expected date: 08/01/2025  -     Hepatitis B Surface Ab, Qualitative; Future; Expected date: 08/01/2025  -     Urinalysis, Reflex to Urine Culture; Future; Expected date: 08/01/2025  Diagnosed approx 1990 with blood donation.   Asymptomatic at time of diagnosis.   CD4 israel >200 per pt recollection, records not available (2/25).  CORNELL history: none per pt report.     Adherence/sexual health counseling done.  Continue Biktarvy 1 tablet daily.   Repeat labs 1 wk prior to next visit.  RTC 6 months with Corrine.  Keep f/u with PCP as scheduled.

## 2025-03-16 ENCOUNTER — OFFICE VISIT (OUTPATIENT)
Dept: URGENT CARE | Facility: CLINIC | Age: 65
End: 2025-03-16
Payer: MEDICARE

## 2025-03-16 VITALS
WEIGHT: 132 LBS | OXYGEN SATURATION: 98 % | HEIGHT: 69 IN | SYSTOLIC BLOOD PRESSURE: 149 MMHG | DIASTOLIC BLOOD PRESSURE: 74 MMHG | HEART RATE: 61 BPM | TEMPERATURE: 97 F | RESPIRATION RATE: 18 BRPM | BODY MASS INDEX: 19.55 KG/M2

## 2025-03-16 DIAGNOSIS — M25.552 LEFT HIP PAIN: Primary | ICD-10-CM

## 2025-03-16 RX ORDER — DEXAMETHASONE SODIUM PHOSPHATE 10 MG/ML
5 INJECTION INTRAMUSCULAR; INTRAVENOUS
Status: COMPLETED | OUTPATIENT
Start: 2025-03-16 | End: 2025-03-16

## 2025-03-16 RX ADMIN — DEXAMETHASONE SODIUM PHOSPHATE 5 MG: 10 INJECTION INTRAMUSCULAR; INTRAVENOUS at 01:03

## 2025-03-16 NOTE — PATIENT INSTRUCTIONS
Modify activity and gentle stretching  Continue taking home medication Soma as directed  Tylenol  OTC as directed for pain  Follow-up with your primary care provider if symptoms worsen or persist as instructed

## 2025-03-16 NOTE — PROGRESS NOTES
"Subjective:      Patient ID: Reyes Ren is a 64 y.o. male.    Vitals:  height is 5' 9" (1.753 m) and weight is 59.9 kg (132 lb). His temperature is 97.2 °F (36.2 °C). His blood pressure is 149/74 (abnormal) and his pulse is 61. His respiration is 18 and oxygen saturation is 98%.     Chief Complaint: Hip Pain    64 y.o. male presents to clinic with c/o  left hip pain starting 3 weeks ago. Pt has tried otc meds with no relief. Pt denies recent injury.       Musculoskeletal:  Positive for pain (left hip).      Objective:     Physical Exam   Constitutional: He is oriented to person, place, and time. He appears well-developed. He is cooperative. No distress.   HENT:   Head: Normocephalic and atraumatic.   Nose: Nose normal.   Mouth/Throat: Oropharynx is clear and moist and mucous membranes are normal.   Eyes: Conjunctivae and lids are normal.   Neck: Trachea normal and phonation normal. Neck supple.   Cardiovascular: Normal rate, regular rhythm, normal heart sounds and normal pulses.   Pulmonary/Chest: Effort normal and breath sounds normal.   Abdominal: Normal appearance and bowel sounds are normal. He exhibits no mass. Soft.   Musculoskeletal:         General: No deformity.        Legs:    Neurological: He is alert and oriented to person, place, and time. He has normal strength and normal reflexes. No sensory deficit.   Skin: Skin is warm, dry, intact and not diaphoretic.   Psychiatric: His speech is normal and behavior is normal. Judgment and thought content normal.   Nursing note and vitals reviewed.    Previous History:     Review of patient's allergies indicates:   Allergen Reactions    Pitavastatin      Other reaction(s): Bone pain    Sulfamethoxazole-trimethoprim Rash       Past Medical History:   Diagnosis Date    Anxiety     Atherosclerosis of native artery of both lower extremities with intermittent claudication 07/07/2020    Coronary artery disease     GERD (gastroesophageal reflux disease)     HIV " infection     Hyperlipidemia     Hypertension      Current Outpatient Medications   Medication Instructions    acyclovir (ZOVIRAX) 400 mg, Oral, 2 times daily    albuterol (PROVENTIL/VENTOLIN HFA) 90 mcg/actuation inhaler INHALE 2 PUFFS BY MOUTH EVERY 6 HOURS AS NEEDED FOR FOR SHORTNESS OF BREATH OR WHEEZING    ALPRAZolam (XANAX) 1 mg, Oral, 2 times daily    amLODIPine (NORVASC) 2.5 mg, Oral, Daily    aspirin (ECOTRIN) 81 mg, Daily    apdtscuwh-usjbigrl-qqxvger ala (BIKTARVY) -25 mg (25 kg or greater) 1 tablet, Oral, Daily    carisoprodoL (SOMA) 350 mg, Oral, 2 times daily    clopidogreL (PLAVIX) 75 mg, Oral    desvenlafaxine succinate (PRISTIQ) 100 mg, Oral    esomeprazole (NEXIUM) 40 MG capsule TAKE 1 CAPSULE BY MOUTH ONCE DAILY    famotidine (PEPCID) 40 MG tablet TAKE 1 TABLET BY MOUTH AT BEDTIME    fluticasone propionate (FLONASE) 50 mcg/actuation nasal spray USE TWO SPRAYS IN EACH NOSTRIL ONCE DAILY    hydrocortisone 2.5 % cream Topical (Top), 2 times daily    losartan (COZAAR) 100 mg, Oral    mupirocin (BACTROBAN) 2 % ointment Topical (Top), 3 times daily    rosuvastatin (CRESTOR) 20 mg, Oral, Nightly     Past Surgical History:   Procedure Laterality Date    CARDIAC CATHETERIZATION      INSERTION OF STENT INTO PERIPHERAL VESSEL Right     INSERTION OF STENT INTO PERIPHERAL VESSEL Left 01/01/2007     Family History   Problem Relation Name Age of Onset    Heart disease Mother      Peripheral vascular disease Mother      Parkinsonism Father      Lung disease Sister      Heart disease Brother Abel     Mental illness Brother Abel        Social History[1]  Assessment:     1. Left hip pain        Plan:   Modify activity and gentle stretching  Continue taking home medication Soma as directed  Tylenol  OTC as directed for pain  Follow-up with your primary care provider if symptoms worsen or persist as instructed     Left hip pain  -     dexAMETHasone injection 5 mg                         [1]   Social  History  Tobacco Use    Smoking status: Some Days     Current packs/day: 0.25     Average packs/day: 0.3 packs/day for 1.2 years (0.3 ttl pk-yrs)     Types: Cigarettes     Start date: 01/2024    Smokeless tobacco: Former    Tobacco comments:     He has a h/o smoking 1 ppd for 30 years.  He quit in January.   Substance Use Topics    Alcohol use: Yes     Alcohol/week: 10.0 standard drinks of alcohol     Types: 10 Cans of beer per week     Comment: occassionally    Drug use: Not Currently

## 2025-03-21 ENCOUNTER — OFFICE VISIT (OUTPATIENT)
Dept: INTERNAL MEDICINE | Facility: CLINIC | Age: 65
End: 2025-03-21
Payer: MEDICARE

## 2025-03-21 VITALS
HEART RATE: 60 BPM | BODY MASS INDEX: 19.11 KG/M2 | OXYGEN SATURATION: 99 % | SYSTOLIC BLOOD PRESSURE: 138 MMHG | DIASTOLIC BLOOD PRESSURE: 86 MMHG | HEIGHT: 69 IN | WEIGHT: 129 LBS

## 2025-03-21 DIAGNOSIS — F41.8 MIXED ANXIETY DEPRESSIVE DISORDER: ICD-10-CM

## 2025-03-21 DIAGNOSIS — I10 PRIMARY HYPERTENSION: ICD-10-CM

## 2025-03-21 DIAGNOSIS — M54.32 SCIATICA OF LEFT SIDE: ICD-10-CM

## 2025-03-21 DIAGNOSIS — M25.552 LEFT HIP PAIN: Primary | ICD-10-CM

## 2025-03-21 DIAGNOSIS — Z21 HIV INFECTION, UNSPECIFIED SYMPTOM STATUS: ICD-10-CM

## 2025-03-21 PROBLEM — E78.5 HYPERLIPIDEMIA: Chronic | Status: ACTIVE | Noted: 2022-10-21

## 2025-03-21 RX ORDER — KETOROLAC TROMETHAMINE 30 MG/ML
15 INJECTION, SOLUTION INTRAMUSCULAR; INTRAVENOUS ONCE
Status: COMPLETED | OUTPATIENT
Start: 2025-03-21 | End: 2025-03-21

## 2025-03-21 RX ORDER — MELOXICAM 7.5 MG/1
7.5 TABLET ORAL DAILY
Qty: 15 TABLET | Refills: 0 | Status: SHIPPED | OUTPATIENT
Start: 2025-03-21

## 2025-03-21 RX ADMIN — KETOROLAC TROMETHAMINE 15 MG: 30 INJECTION, SOLUTION INTRAMUSCULAR; INTRAVENOUS at 09:03
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
## 2025-03-21 NOTE — ASSESSMENT & PLAN NOTE
Well-controlled   Continue losartan 100 mg daily and amlodipine 2.5 mg daily   Encouraged low-sodium diet

## 2025-03-21 NOTE — ASSESSMENT & PLAN NOTE
Received Dexamethasone injection at recent urgent care visit.  Will give one time Toradol injection in office today.  RX Mobic 7.5mg daily prn (start tomorrow) sent to pharmacy.  OK to continue Soma BID as previously prescribed  Sciatica exercises printed on end of visit summary.  Instructed to try ice/heat as needed for comfort.  If no improvement in the next 3-5 days, patient will contact office for referral to PT.

## 2025-03-21 NOTE — ASSESSMENT & PLAN NOTE
No significant improvement with OTC ibuprofen.  Will give one time Toradol injection in office today.  RX Mobic 7.5mg daily prn (start tomorrow) sent to pharmacy.  Instructed to try ice/heat as needed for comfort.  If no improvement in the next 3-5 days, patient will contact office for referral to PT.

## 2025-03-25 DIAGNOSIS — M62.838 MUSCLE SPASM: ICD-10-CM

## 2025-03-25 RX ORDER — CARISOPRODOL 350 MG/1
350 TABLET ORAL 2 TIMES DAILY
Qty: 60 TABLET | Refills: 0 | Status: SHIPPED | OUTPATIENT
Start: 2025-03-25

## 2025-03-27 ENCOUNTER — TELEPHONE (OUTPATIENT)
Dept: INTERNAL MEDICINE | Facility: CLINIC | Age: 65
End: 2025-03-27
Payer: MEDICARE

## 2025-03-27 DIAGNOSIS — M25.552 LEFT HIP PAIN: Primary | ICD-10-CM

## 2025-03-27 DIAGNOSIS — G89.29 OTHER CHRONIC PAIN: ICD-10-CM

## 2025-03-27 NOTE — TELEPHONE ENCOUNTER
Spoke with pt he states that he's feeling 20% better and that it still stings in his butt area; he wants therapy ([facility] in [place]); please advise

## 2025-03-27 NOTE — TELEPHONE ENCOUNTER
Aly Hirsch Staff  Caller: Unspecified (Today,  9:57 AM)  .Who Called: Reyes Ren    Caller is requesting assistance/information from provider's office.    Symptoms (please be specific): hip  How long has patient had these symptoms:  n/a  List of preferred pharmacies on file (remove unneeded): [unfilled]  If different, enter pharmacy into here including location and phone number: n/a      Preferred Method of Contact: Phone Call  Patient's Preferred Phone Number on File: [phone]  Best Call Back Number, if different:  Additional Information:  pt called requesting to speak with nurse in regards to his hip was seen by GERMAIN Woods 03/21/25 and was told to call with any updates.

## 2025-04-16 DIAGNOSIS — E78.5 HYPERLIPIDEMIA, UNSPECIFIED HYPERLIPIDEMIA TYPE: ICD-10-CM

## 2025-04-16 RX ORDER — ROSUVASTATIN CALCIUM 20 MG/1
20 TABLET, COATED ORAL NIGHTLY
Qty: 90 TABLET | Refills: 3 | Status: SHIPPED | OUTPATIENT
Start: 2025-04-16

## 2025-04-25 DIAGNOSIS — M62.838 MUSCLE SPASM: ICD-10-CM

## 2025-04-25 RX ORDER — CARISOPRODOL 350 MG/1
350 TABLET ORAL 2 TIMES DAILY
Qty: 60 TABLET | Refills: 0 | Status: SHIPPED | OUTPATIENT
Start: 2025-04-25

## 2025-05-09 DIAGNOSIS — K21.9 GASTROESOPHAGEAL REFLUX DISEASE, UNSPECIFIED WHETHER ESOPHAGITIS PRESENT: ICD-10-CM

## 2025-05-12 RX ORDER — FAMOTIDINE 40 MG/1
40 TABLET, FILM COATED ORAL NIGHTLY
Qty: 30 TABLET | Refills: 11 | Status: SHIPPED | OUTPATIENT
Start: 2025-05-12

## 2025-05-19 DIAGNOSIS — K21.9 GASTROESOPHAGEAL REFLUX DISEASE, UNSPECIFIED WHETHER ESOPHAGITIS PRESENT: ICD-10-CM

## 2025-05-19 RX ORDER — ESOMEPRAZOLE MAGNESIUM 40 MG/1
40 CAPSULE, DELAYED RELEASE ORAL DAILY
Qty: 30 CAPSULE | Refills: 11 | Status: SHIPPED | OUTPATIENT
Start: 2025-05-19

## 2025-05-27 DIAGNOSIS — M62.838 MUSCLE SPASM: ICD-10-CM

## 2025-05-27 NOTE — TELEPHONE ENCOUNTER
Copied from CRM #[id number]. Topic: Medications - Medication Refill  >> May 27, 2025  9:56 AM Viridiana wrote:  .Who Called: Reyes Ren    Refill or New Rx:Refill    RX Name and Strength: carisoprodoL (SOMA) 350 MG tablet    How is the patient currently taking it? (ex. 1XDay): Sig - Route: Take 1 tablet (350 mg total) by mouth 2 (two) times daily. - Oral    Is this a 30 day or 90 day RX: 60    Local or Mail Order: Local    List of preferred pharmacies on file (remove unneeded): [facility] [address] [address]  Phone: [phone] Fax: [phone]    Ordering Provider: Dr. Perez    Preferred Method of Contact: Phone Call    Patient's Preferred Phone Number on File: [phone]     Best Call Back Number, if different:    Additional Information: N/A

## 2025-05-28 RX ORDER — CARISOPRODOL 350 MG/1
350 TABLET ORAL 2 TIMES DAILY
Qty: 60 TABLET | Refills: 0 | Status: SHIPPED | OUTPATIENT
Start: 2025-05-28

## 2025-06-11 DIAGNOSIS — F41.8 MIXED ANXIETY DEPRESSIVE DISORDER: ICD-10-CM

## 2025-06-11 DIAGNOSIS — I77.9 PERIPHERAL ARTERIAL OCCLUSIVE DISEASE: ICD-10-CM

## 2025-06-11 RX ORDER — CLOPIDOGREL BISULFATE 75 MG/1
75 TABLET ORAL ONCE
Qty: 30 TABLET | Refills: 3 | Status: SHIPPED | OUTPATIENT
Start: 2025-06-11 | End: 2025-06-11

## 2025-06-11 NOTE — TELEPHONE ENCOUNTER
Copied from CRM #[id number]. Topic: Medications - Medication Refill  >> Jun 11, 2025 11:03 AM Zeynep wrote:  Who Called: Reyes Ren    Refill or New Rx:Refill  RX Name and Strength:ALPRAZolam (XANAX) 1 MG tablet  How is the patient currently taking it? (ex. 1XDay):Take 1 tablet (1 mg total) by mouth 2 (two) times daily. - Oral  Is this a 30 day or 90 day RX:60  Local or Mail Order:local  List of preferred pharmacies on file (remove unneeded): [facility] [address]      Ordering Provider:Myriam Perez MD      Preferred Method of Contact: Phone Call  Patient's Preferred Phone Number on File: [phone]   Best Call Back Number, if different:    Who Called: Reyes Ren    Refill or New Rx:Refill  RX Name and Strength:clopidogreL (PLAVIX) 75 mg tablet  How is the patient currently taking it? (ex. 1XDay): TAKE ONE TABLET BY MOUTH ONCE DAILY - Oral  Is this a 30 day or 90 day RX:30  Local or Mail Order:local   List of preferred pharmacies on file (remove unneeded): [facility] [place] [address] [address]      Ordering Provider:Myriam Perez MD      Preferred Method of Contact: Phone Call  Patient's Preferred Phone Number on File: [phone]   Best Call Back Number, if different:  Additional Information:    Additional Information:

## 2025-06-12 DIAGNOSIS — B00.9 HSV (HERPES SIMPLEX VIRUS) INFECTION: ICD-10-CM

## 2025-06-12 RX ORDER — ACYCLOVIR 400 MG/1
400 TABLET ORAL 2 TIMES DAILY
Qty: 180 TABLET | Refills: 1 | Status: SHIPPED | OUTPATIENT
Start: 2025-06-12

## 2025-06-12 RX ORDER — ALPRAZOLAM 1 MG/1
1 TABLET ORAL 2 TIMES DAILY
Qty: 60 TABLET | Refills: 5 | Status: SHIPPED | OUTPATIENT
Start: 2025-06-12

## 2025-06-12 NOTE — TELEPHONE ENCOUNTER
Copied from CRM #[id number]. Topic: Medications - Medication Refill  >> Jun 12, 2025  9:48 AM Yesica wrote:  Who Called: Reyes Ren    Refill or New Rx:Refill  RX Name and Strength:ALPRAZolam (XANAX) 1 MG tablet  How is the patient currently taking it? (ex. 1XDay):2x  Is this a 30 day or 90 day RX:60  Local or Mail Order:local  List of preferred pharmacies on file (remove unneeded): [unfilled][facility] [address]  If different Pharmacy is requested, enter Pharmacy information here including location and phone number:    Ordering Provider:Ana      Preferred Method of Contact: Phone Call  Patient's Preferred Phone Number on File: [phone]   Best Call Back Number, if different:  Additional Information: pt called for med refill pls advise

## 2025-06-16 ENCOUNTER — TELEPHONE (OUTPATIENT)
Dept: INTERNAL MEDICINE | Facility: CLINIC | Age: 65
End: 2025-06-16

## 2025-06-16 ENCOUNTER — OFFICE VISIT (OUTPATIENT)
Dept: INTERNAL MEDICINE | Facility: CLINIC | Age: 65
End: 2025-06-16
Payer: MEDICARE

## 2025-06-16 VITALS
DIASTOLIC BLOOD PRESSURE: 72 MMHG | HEIGHT: 69 IN | WEIGHT: 131 LBS | SYSTOLIC BLOOD PRESSURE: 130 MMHG | BODY MASS INDEX: 19.4 KG/M2 | RESPIRATION RATE: 18 BRPM | HEART RATE: 62 BPM | OXYGEN SATURATION: 98 %

## 2025-06-16 DIAGNOSIS — Z72.0 TOBACCO USE: ICD-10-CM

## 2025-06-16 DIAGNOSIS — Z21 HIV INFECTION, UNSPECIFIED SYMPTOM STATUS: Chronic | ICD-10-CM

## 2025-06-16 DIAGNOSIS — I10 PRIMARY HYPERTENSION: ICD-10-CM

## 2025-06-16 DIAGNOSIS — Z23 NEED FOR PNEUMOCOCCAL 20-VALENT CONJUGATE VACCINATION: ICD-10-CM

## 2025-06-16 DIAGNOSIS — E78.5 HYPERLIPIDEMIA, UNSPECIFIED HYPERLIPIDEMIA TYPE: Chronic | ICD-10-CM

## 2025-06-16 DIAGNOSIS — F41.8 MIXED ANXIETY DEPRESSIVE DISORDER: ICD-10-CM

## 2025-06-16 DIAGNOSIS — Z00.00 ENCOUNTER FOR MEDICARE ANNUAL WELLNESS EXAM: Primary | ICD-10-CM

## 2025-06-16 DIAGNOSIS — I77.9 PERIPHERAL ARTERIAL OCCLUSIVE DISEASE: ICD-10-CM

## 2025-06-16 PROCEDURE — 1160F RVW MEDS BY RX/DR IN RCRD: CPT | Mod: CPTII,,, | Performed by: INTERNAL MEDICINE

## 2025-06-16 PROCEDURE — 1126F AMNT PAIN NOTED NONE PRSNT: CPT | Mod: CPTII,,, | Performed by: INTERNAL MEDICINE

## 2025-06-16 PROCEDURE — 1101F PT FALLS ASSESS-DOCD LE1/YR: CPT | Mod: CPTII,,, | Performed by: INTERNAL MEDICINE

## 2025-06-16 PROCEDURE — 3078F DIAST BP <80 MM HG: CPT | Mod: CPTII,,, | Performed by: INTERNAL MEDICINE

## 2025-06-16 PROCEDURE — 3288F FALL RISK ASSESSMENT DOCD: CPT | Mod: CPTII,,, | Performed by: INTERNAL MEDICINE

## 2025-06-16 PROCEDURE — 1124F ACP DISCUSS-NO DSCNMKR DOCD: CPT | Mod: CPTII,,, | Performed by: INTERNAL MEDICINE

## 2025-06-16 PROCEDURE — 1159F MED LIST DOCD IN RCRD: CPT | Mod: CPTII,,, | Performed by: INTERNAL MEDICINE

## 2025-06-16 PROCEDURE — 4010F ACE/ARB THERAPY RXD/TAKEN: CPT | Mod: CPTII,,, | Performed by: INTERNAL MEDICINE

## 2025-06-16 PROCEDURE — G0009 ADMIN PNEUMOCOCCAL VACCINE: HCPCS | Mod: ,,, | Performed by: INTERNAL MEDICINE

## 2025-06-16 PROCEDURE — 3075F SYST BP GE 130 - 139MM HG: CPT | Mod: CPTII,,, | Performed by: INTERNAL MEDICINE

## 2025-06-16 PROCEDURE — G0402 INITIAL PREVENTIVE EXAM: HCPCS | Mod: ,,, | Performed by: INTERNAL MEDICINE

## 2025-06-16 PROCEDURE — 90677 PCV20 VACCINE IM: CPT | Mod: ,,, | Performed by: INTERNAL MEDICINE

## 2025-06-16 RX ORDER — AMLODIPINE BESYLATE 5 MG/1
5 TABLET ORAL DAILY
Qty: 90 TABLET | Refills: 3 | Status: SHIPPED | OUTPATIENT
Start: 2025-06-16

## 2025-06-16 NOTE — TELEPHONE ENCOUNTER
Copied from CRM #[id number]. Topic: Medications - Pharmacy  >> Jun 16, 2025  1:15 PM Zeynep wrote:  Who Called: [facility]    Caller is requesting assistance/information from provider's office.    Symptoms (please be specific): n/a   How long has patient had these symptoms:  n/a  List of preferred pharmacies on file (remove unneeded): n/a      Preferred Method of Contact: Phone Call  Patient's Preferred Phone Number on File: [phone]   Best Call Back Number, if different:[phone]  Additional Information: needs to clarify amLODIPine (NORVASC) 5 MG tablet dosage.

## 2025-06-16 NOTE — ASSESSMENT & PLAN NOTE
Health Maintenance Due   Topic Date Due    RSV Vaccine (Age 60+ and Pregnant patients) (1 - Risk 60-74 years 1-dose series) Never done    Pneumococcal Vaccines (Age 50+) (4 of 4 - PCV20 or PCV21) 03/01/2023    Abdominal Aortic Aneurysm Screening  Never done     He has a panel of labs ordered for August.  He already sees Dr. Ramirez for PAD.  Will refer to him for AAA screening.  He walks on his treadmill daily for at least 30 minutes.      Advance Care Planning     Date: 06/16/2025  Patient did not wish or was not able to name a surrogate decision maker or provide an Advance Care Plan.  He already has advanced directives.

## 2025-06-16 NOTE — PROGRESS NOTES
"Subjective:        Patient Care Team:  Myriam Perez MD as PCP - General (Internal Medicine)  Corrine Portillo APRN as Nurse Practitioner (Infectious Diseases)  Wilton Moon MD as Consulting Physician (Vascular Surgery)     Chief Complaint: Medicare AWV      HPI:He is here for a medicare wellness.  He has a cold and a fever blister.  He's been haing some left hip pain and did some PT, and its doing a little better.  He was having some tingling from his hip down the side of the leg on the left, but that is better.  He was having some anxiety related to the hip, but that is a little better.  He does check  his BP at home, and its been 130-140/60s.  It depends on his anxiety.    He has started smoking again -- and he is doen 1-2 cigarettes per day.    He had some kind of facial "sculpting" done with Dr. Espino.  He was unhappy with the outcome -- face remained numb for 2 mos.  He doesn't plan to do it again.  Problem Noted   Encounter for Medicare Annual Wellness Exam 5/10/2023   Degeneration of Intervertebral Disc of Lumbar Region 10/21/2022   Gastroesophageal Reflux Disease 10/21/2022   Hyperlipidemia 10/21/2022   Hypertension 10/21/2022   Hypogonadism in Male 10/21/2022   Mixed Anxiety Depressive Disorder 10/21/2022   Neuropathy 10/21/2022   Panic Attack 10/21/2022   Peripheral Arterial Occlusive Disease 10/21/2022    s/p stents/angioplasty right lower ext.had blockage in his left calf that was stented and then re-occluded.  Then received another opinion from Dr. Moon who rec exercise and conditioning and that has helped alot.  He has to walk a pretty long distance before it starts to cramp.     Sciatica 10/21/2022   Chronic Pain 10/21/2022   HIV Infection 6/27/2022    has been undetectable for 13-14 years     Atherosclerosis of Native Artery of Both Lower Extremities With Intermittent Claudication (Resolved) 7/7/2020        The patient's Health Maintenance was reviewed and the following " appears to be due:   Health Maintenance Due   Topic Date Due    RSV Vaccine (Age 60+ and Pregnant patients) (1 - Risk 60-74 years 1-dose series) Never done    Pneumococcal Vaccines (Age 50+) (4 of 4 - PCV20 or PCV21) 03/01/2023    Abdominal Aortic Aneurysm Screening  Never done       Problem List  Active Problem List with Overview Notes    Diagnosis Date Noted    Encounter for Medicare annual wellness exam 05/10/2023    Left hip pain 03/21/2025    Degeneration of intervertebral disc of lumbar region 10/21/2022    Gastroesophageal reflux disease 10/21/2022    Hyperlipidemia 10/21/2022    Hypertension 10/21/2022    Hypogonadism in male 10/21/2022    Mixed anxiety depressive disorder 10/21/2022    Neuropathy 10/21/2022    Panic attack 10/21/2022    Peripheral arterial occlusive disease 10/21/2022     s/p stents/angioplasty right lower ext.had blockage in his left calf that was stented and then re-occluded.  Then received another opinion from Dr. Ramirez who rec exercise and conditioning and that has helped alot.  He has to walk a pretty long distance before it starts to cramp.      Sciatica 10/21/2022    Chronic pain 10/21/2022    HIV infection 06/27/2022     has been undetectable for 13-14 years      HSV (herpes simplex virus) infection 06/27/2022       Past Medical History:  Past Medical History:   Diagnosis Date    Anxiety     Atherosclerosis of native artery of both lower extremities with intermittent claudication 07/07/2020    Coronary artery disease     GERD (gastroesophageal reflux disease)     HIV infection     Hyperlipidemia     Hypertension      Past Surgical History:   Procedure Laterality Date    CARDIAC CATHETERIZATION      INSERTION OF STENT INTO PERIPHERAL VESSEL Right     INSERTION OF STENT INTO PERIPHERAL VESSEL Left 01/01/2007     Review of patient's allergies indicates:   Allergen Reactions    Pitavastatin      Other reaction(s): Bone pain    Sulfamethoxazole-trimethoprim Rash     Medications  "Ordered Prior to Encounter[1]  Social History[2]  Family History   Problem Relation Name Age of Onset    Heart disease Mother      Peripheral vascular disease Mother      Parkinsonism Father      Lung disease Sister      Heart disease Brother Abel     Mental illness Brother Abel        Review of Systems        Objective:   /72 (BP Location: Left arm, Patient Position: Sitting)   Pulse 62   Resp 18   Ht 5' 9.02" (1.753 m)   Wt 59.4 kg (131 lb)   SpO2 98%   BMI 19.34 kg/m²     Physical Exam  Vitals reviewed.   Constitutional:       Appearance: Normal appearance.   HENT:      Head: Normocephalic and atraumatic.   Eyes:      Conjunctiva/sclera: Conjunctivae normal.   Cardiovascular:      Rate and Rhythm: Normal rate and regular rhythm.      Pulses: Normal pulses.      Heart sounds: Normal heart sounds. No murmur heard.     No friction rub. No gallop.   Pulmonary:      Effort: Pulmonary effort is normal. No respiratory distress.      Breath sounds: Normal breath sounds. No wheezing, rhonchi or rales.   Abdominal:      General: Abdomen is flat. There is no distension.      Palpations: Abdomen is soft. There is no mass.      Tenderness: There is no guarding or rebound.   Skin:     General: Skin is warm and dry.   Neurological:      General: No focal deficit present.      Mental Status: He is alert and oriented to person, place, and time.   Psychiatric:         Mood and Affect: Mood normal.         Behavior: Behavior normal.         Thought Content: Thought content normal.         Judgment: Judgment normal.         Assessment and Plan:     Encounter for Medicare annual wellness exam  Health Maintenance Due   Topic Date Due    RSV Vaccine (Age 60+ and Pregnant patients) (1 - Risk 60-74 years 1-dose series) Never done    Pneumococcal Vaccines (Age 50+) (4 of 4 - PCV20 or PCV21) 03/01/2023    Abdominal Aortic Aneurysm Screening  Never done     He has a panel of labs ordered for August.  He already sees  " Ashley for PAD.  Will refer to him for AAA screening.  He walks on his treadmill daily for at least 30 minutes.      Advance Care Planning    Date: 06/16/2025  Patient did not wish or was not able to name a surrogate decision maker or provide an Advance Care Plan.  He already has advanced directives.         Peripheral arterial occlusive disease  Stable, followed by Ashley Soto, on plavix.    Mixed anxiety depressive disorder  Stable. Continue pristiq and xana bid.    Hypertension  Increase amlodipine to 5 mg daily.  Continue losartan 100 mg daily.    Hyperlipidemia  Controlled.  Continue crestor.    HIV infection  He is followed at the HIV clinic.   Follow up in about 6 months (around 12/16/2025).    Medications Ordered This Encounter   Medications    amLODIPine (NORVASC) 5 MG tablet     Sig: Take 1 tablet (5 mg total) by mouth once daily.     Dispense:  90 tablet     Refill:  3     .     [unfilled]  Orders Placed This Encounter   Procedures    Ambulatory referral/consult to Vascular Surgery     Standing Status:   Future     Expected Date:   6/23/2025     Expiration Date:   7/16/2026     Referral Priority:   Routine     Referral Type:   Consultation     Referral Reason:   Specialty Services Required     Referred to Provider:   Wilton Moon MD     Requested Specialty:   Vascular Surgery     Number of Visits Requested:   1         A comprehensive HEALTH RISK ASSESSMENT was completed today. Results are summarized below:    There are NO EMOTIONAL/SOCIAL CONCERNS identified on today's screening for Social Isolation, Depression and Anxiety.    There are NO COGNITIVE FUNCTION CONCERNS identified on today's screening.  There are NO FUNCTIONAL OR SAFETY CONCERNS were identified on today's screening for Physical Symptoms, Nutritional, Cognitive Function, Home Safety/Living Situation, Fall Risk, Activities of Daily Living, Independent Activities of Daily Living, Physical Activity, Timed Up and Go test and  Whisper test.   The patient reports NO OPIOID PRESCRIPTIONS. This was confirmed through medication reconciliation.    The patient is NOT A TOBACCO USER.  The patient reports NO SIGNIFICANT ALCOHOL USE.     All Questions regarding food, transportation or housing were not answered today.    Follow up in about 6 months (around 12/16/2025). In addition to their scheduled follow up, the patient has also been instructed to follow up on as needed basis.          [1]   Current Outpatient Medications on File Prior to Visit   Medication Sig Dispense Refill    acyclovir (ZOVIRAX) 400 MG tablet TAKE ONE TABLET BY MOUTH TWICE DAILY 180 tablet 1    albuterol (PROVENTIL/VENTOLIN HFA) 90 mcg/actuation inhaler INHALE 2 PUFFS BY MOUTH EVERY 6 HOURS AS NEEDED FOR FOR SHORTNESS OF BREATH OR WHEEZING 18 g 6    ALPRAZolam (XANAX) 1 MG tablet Take 1 tablet (1 mg total) by mouth 2 (two) times daily. 60 tablet 5    aspirin (ECOTRIN) 81 MG EC tablet Take 81 mg by mouth once daily.      ahiajvlxi-bvrienln-syrrjyb ala (BIKTARVY) -25 mg (25 kg or greater) Take 1 tablet by mouth once daily. 90 tablet 1    carisoprodoL (SOMA) 350 MG tablet Take 1 tablet (350 mg total) by mouth 2 (two) times daily. 60 tablet 0    desvenlafaxine succinate (PRISTIQ) 100 MG Tb24 TAKE ONE TABLET BY MOUTH EVERY DAY 90 tablet 3    esomeprazole (NEXIUM) 40 MG capsule Take 1 capsule (40 mg total) by mouth once daily. 30 capsule 11    famotidine (PEPCID) 40 MG tablet TAKE 1 TABLET BY MOUTH AT BEDTIME 30 tablet 11    fluticasone propionate (FLONASE) 50 mcg/actuation nasal spray USE TWO SPRAYS IN EACH NOSTRIL ONCE DAILY 16 g 5    hydrocortisone 2.5 % cream Apply topically 2 (two) times daily. 28 g 0    losartan (COZAAR) 100 MG tablet TAKE ONE TABLET BY MOUTH ONCE DAILY 90 tablet 3    mupirocin (BACTROBAN) 2 % ointment Apply topically 3 (three) times daily. 15 g 0    rosuvastatin (CRESTOR) 20 MG tablet TAKE ONE TABLET BY MOUTH EVERY EVENING 90 tablet 3    [DISCONTINUED]  amLODIPine (NORVASC) 2.5 MG tablet Take 1 tablet (2.5 mg total) by mouth once daily. 30 tablet 11    clopidogreL (PLAVIX) 75 mg tablet Take 1 tablet (75 mg total) by mouth once. for 1 dose 30 tablet 3    [DISCONTINUED] meloxicam (MOBIC) 7.5 MG tablet Take 1 tablet (7.5 mg total) by mouth once daily. 15 tablet 0     No current facility-administered medications on file prior to visit.   [2]   Social History  Socioeconomic History    Marital status: Single   Tobacco Use    Smoking status: Former     Current packs/day: 0.25     Average packs/day: 0.3 packs/day for 1.5 years (0.4 ttl pk-yrs)     Types: Cigarettes     Start date: 01/2024    Smokeless tobacco: Former    Tobacco comments:     He has a h/o smoking 1 ppd for 30 years.  He quit in January.   Substance and Sexual Activity    Alcohol use: Yes     Alcohol/week: 10.0 standard drinks of alcohol     Types: 10 Cans of beer per week     Comment: occassionally    Drug use: Not Currently    Sexual activity: Not Currently     Partners: Male     Birth control/protection: None     Social Drivers of Health     Financial Resource Strain: High Risk (3/20/2025)    Overall Financial Resource Strain (CARDIA)     Difficulty of Paying Living Expenses: Very hard   Food Insecurity: Food Insecurity Present (3/20/2025)    Hunger Vital Sign     Worried About Running Out of Food in the Last Year: Often true     Ran Out of Food in the Last Year: Sometimes true   Transportation Needs: No Transportation Needs (3/20/2025)    PRAPARE - Transportation     Lack of Transportation (Medical): No     Lack of Transportation (Non-Medical): No   Physical Activity: Insufficiently Active (3/20/2025)    Exercise Vital Sign     Days of Exercise per Week: 3 days     Minutes of Exercise per Session: 40 min   Stress: Stress Concern Present (3/20/2025)    [employer] [place] [employer] - Occupational Stress Questionnaire     Feeling of Stress : Rather much   Housing Stability: Low Risk  (3/20/2025)     Housing Stability Vital Sign     Unable to Pay for Housing in the Last Year: No     Homeless in the Last Year: No

## 2025-06-24 DIAGNOSIS — M62.838 MUSCLE SPASM: ICD-10-CM

## 2025-06-24 NOTE — TELEPHONE ENCOUNTER
Copied from CRM #[id number]. Topic: Medications - Medication Refill  >> Jun 24, 2025  8:55 AM Chelly Lundberg wrote:  Who Called: Reyes Ren    Refill or New Rx:Refill  RX Name and Strength:carisoprodoL (SOMA) 350 MG tablet  How is the patient currently taking it? (ex. 1XDay):  Is this a 30 day or 90 day RX:  Local or Mail Order:  List of preferred pharmacies on file (remove unneeded): [facility] [address]  If different Pharmacy is requested, enter Pharmacy information here including location and phone number:    Ordering Provider:      Preferred Method of Contact: Phone Call  Patient's Preferred Phone Number on File: [phone]   Best Call Back Number, if different:  Additional Information: pt is in need of a refill. Please advise

## 2025-06-25 RX ORDER — CARISOPRODOL 350 MG/1
350 TABLET ORAL 2 TIMES DAILY
Qty: 60 TABLET | Refills: 0 | Status: SHIPPED | OUTPATIENT
Start: 2025-06-25

## 2025-07-28 DIAGNOSIS — M62.838 MUSCLE SPASM: ICD-10-CM

## 2025-07-28 RX ORDER — CARISOPRODOL 350 MG/1
350 TABLET ORAL 2 TIMES DAILY
Qty: 60 TABLET | Refills: 0 | Status: SHIPPED | OUTPATIENT
Start: 2025-07-28

## 2025-07-28 NOTE — TELEPHONE ENCOUNTER
Refill or New Rx:Refill      RX Name and Strength:carisoprodoL (SOMA) 350 MG tablet      How is the patient currently taking it? (ex. 1XDay):2xday      Is this a 30 day or 90 day RX:60       **Meds Proposed

## 2025-07-28 NOTE — TELEPHONE ENCOUNTER
Copied from CRM #[id number]. Topic: Medications - Medication Refill  >> Jul 28, 2025 11:46 AM Jessika wrote:  .Who Called: Reyes Ren    Refill or New Rx:Refill  RX Name and Strength:carisoprodoL (SOMA) 350 MG tablet   How is the patient currently taking it? (ex. 1XDay):2xday  Is this a 30 day or 90 day RX:60  Local or Mail Order:Local  List of preferred pharmacies on file (remove unneeded):[facility] [address]      If different Pharmacy is requested, enter Pharmacy information here including location and phone number: na   Ordering Provider:PCP      Preferred Method of Contact: Phone Call  Patient's Preferred Phone Number on File: [phone]   Best Call Back Number, if different:  Additional Information:

## 2025-08-20 ENCOUNTER — LAB VISIT (OUTPATIENT)
Dept: LAB | Facility: HOSPITAL | Age: 65
End: 2025-08-20
Attending: NURSE PRACTITIONER
Payer: MEDICARE

## 2025-08-20 DIAGNOSIS — Z21 ASYMPTOMATIC HIV INFECTION, WITH NO HISTORY OF HIV-RELATED ILLNESS: ICD-10-CM

## 2025-08-20 LAB
25(OH)D3+25(OH)D2 SERPL-MCNC: 41 NG/ML (ref 30–80)
ALBUMIN SERPL-MCNC: 3.9 G/DL (ref 3.4–4.8)
ALBUMIN/GLOB SERPL: 1.1 RATIO (ref 1.1–2)
ALP SERPL-CCNC: 75 UNIT/L (ref 40–150)
ALT SERPL-CCNC: 14 UNIT/L (ref 0–55)
AMORPH URATE CRY URNS QL MICRO: 2 /UL
ANION GAP SERPL CALC-SCNC: 7 MEQ/L
AST SERPL-CCNC: 11 UNIT/L (ref 11–45)
BACTERIA #/AREA URNS AUTO: ABNORMAL /HPF
BASOPHILS # BLD AUTO: 0.03 X10(3)/MCL
BASOPHILS NFR BLD AUTO: 0.5 %
BILIRUB SERPL-MCNC: 0.5 MG/DL
BILIRUB UR QL STRIP.AUTO: NEGATIVE
BUN SERPL-MCNC: 16.2 MG/DL (ref 8.4–25.7)
C TRACH DNA SPEC QL NAA+PROBE: NOT DETECTED
CALCIUM SERPL-MCNC: 9.3 MG/DL (ref 8.8–10)
CHLORIDE SERPL-SCNC: 107 MMOL/L (ref 98–107)
CHOLEST SERPL-MCNC: 120 MG/DL
CHOLEST/HDLC SERPL: 3 {RATIO} (ref 0–5)
CLARITY UR: ABNORMAL
CO2 SERPL-SCNC: 25 MMOL/L (ref 23–31)
COLOR UR AUTO: YELLOW
CREAT SERPL-MCNC: 0.95 MG/DL (ref 0.72–1.25)
CREAT/UREA NIT SERPL: 17
EOSINOPHIL # BLD AUTO: 0.2 X10(3)/MCL (ref 0–0.9)
EOSINOPHIL NFR BLD AUTO: 3.2 %
ERYTHROCYTE [DISTWIDTH] IN BLOOD BY AUTOMATED COUNT: 12.6 % (ref 11.5–17)
EST. AVERAGE GLUCOSE BLD GHB EST-MCNC: 119.8 MG/DL
GFR SERPLBLD CREATININE-BSD FMLA CKD-EPI: >60 ML/MIN/1.73/M2
GLOBULIN SER-MCNC: 3.6 GM/DL (ref 2.4–3.5)
GLUCOSE SERPL-MCNC: 106 MG/DL (ref 82–115)
GLUCOSE UR QL STRIP: NORMAL
HAV AB SER QL IA: REACTIVE
HBA1C MFR BLD: 5.8 %
HBV SURFACE AB SER-ACNC: 277.03 MIU/ML
HBV SURFACE AB SERPL IA-ACNC: REACTIVE M[IU]/ML
HCT VFR BLD AUTO: 46.7 % (ref 42–52)
HCV AB SERPL QL IA: NONREACTIVE
HDLC SERPL-MCNC: 35 MG/DL (ref 35–60)
HGB BLD-MCNC: 15.7 G/DL (ref 14–18)
HGB UR QL STRIP: ABNORMAL
HIV1 RNA # SERPL NAA+PROBE: <20 COPIES/ML
HIV1 RNA SERPL NAA+PROBE-LOG#: ABNORMAL {LOG_COPIES}/ML
HYALINE CASTS #/AREA URNS LPF: ABNORMAL /LPF
IMM GRANULOCYTES # BLD AUTO: 0.01 X10(3)/MCL (ref 0–0.04)
IMM GRANULOCYTES NFR BLD AUTO: 0.2 %
KETONES UR QL STRIP: NEGATIVE
LDLC SERPL CALC-MCNC: 63 MG/DL (ref 50–140)
LEUKOCYTE ESTERASE UR QL STRIP: NEGATIVE
LYMPHOCYTES # BLD AUTO: 2.43 X10(3)/MCL (ref 0.6–4.6)
LYMPHOCYTES NFR BLD AUTO: 38.5 %
MCH RBC QN AUTO: 32.9 PG (ref 27–31)
MCHC RBC AUTO-ENTMCNC: 33.6 G/DL (ref 33–36)
MCV RBC AUTO: 97.9 FL (ref 80–94)
MONOCYTES # BLD AUTO: 0.7 X10(3)/MCL (ref 0.1–1.3)
MONOCYTES NFR BLD AUTO: 11.1 %
MUCOUS THREADS URNS QL MICRO: ABNORMAL /LPF
N GONORRHOEA DNA SPEC QL NAA+PROBE: NOT DETECTED
NEUTROPHILS # BLD AUTO: 2.94 X10(3)/MCL (ref 2.1–9.2)
NEUTROPHILS NFR BLD AUTO: 46.5 %
NITRITE UR QL STRIP: NEGATIVE
NRBC BLD AUTO-RTO: 0 %
PH UR STRIP: 5.5 [PH]
PLATELET # BLD AUTO: 227 X10(3)/MCL (ref 130–400)
PMV BLD AUTO: 9.5 FL (ref 7.4–10.4)
POTASSIUM SERPL-SCNC: 4.2 MMOL/L (ref 3.5–5.1)
PROT SERPL-MCNC: 7.5 GM/DL (ref 5.8–7.6)
PROT UR QL STRIP: ABNORMAL
RBC # BLD AUTO: 4.77 X10(6)/MCL (ref 4.7–6.1)
RBC #/AREA URNS AUTO: ABNORMAL /HPF
SODIUM SERPL-SCNC: 139 MMOL/L (ref 136–145)
SP GR UR STRIP.AUTO: 1.03 (ref 1–1.03)
SPECIMEN SOURCE: NORMAL
SQUAMOUS #/AREA URNS LPF: ABNORMAL /HPF
T PALLIDUM AB SER QL: NONREACTIVE
TRIGL SERPL-MCNC: 109 MG/DL (ref 34–140)
TSH SERPL-ACNC: 1.02 UIU/ML (ref 0.35–4.94)
UROBILINOGEN UR STRIP-ACNC: NORMAL
VLDLC SERPL CALC-MCNC: 22 MG/DL
WBC # BLD AUTO: 6.31 X10(3)/MCL (ref 4.5–11.5)
WBC #/AREA URNS AUTO: ABNORMAL /HPF

## 2025-08-20 PROCEDURE — 84443 ASSAY THYROID STIM HORMONE: CPT

## 2025-08-20 PROCEDURE — 81001 URINALYSIS AUTO W/SCOPE: CPT

## 2025-08-20 PROCEDURE — 82306 VITAMIN D 25 HYDROXY: CPT

## 2025-08-20 PROCEDURE — 86706 HEP B SURFACE ANTIBODY: CPT

## 2025-08-20 PROCEDURE — 86361 T CELL ABSOLUTE COUNT: CPT

## 2025-08-20 PROCEDURE — 87491 CHLMYD TRACH DNA AMP PROBE: CPT

## 2025-08-20 PROCEDURE — 83036 HEMOGLOBIN GLYCOSYLATED A1C: CPT

## 2025-08-20 PROCEDURE — 86803 HEPATITIS C AB TEST: CPT

## 2025-08-20 PROCEDURE — 85025 COMPLETE CBC W/AUTO DIFF WBC: CPT

## 2025-08-20 PROCEDURE — 36415 COLL VENOUS BLD VENIPUNCTURE: CPT

## 2025-08-20 PROCEDURE — 86480 TB TEST CELL IMMUN MEASURE: CPT

## 2025-08-20 PROCEDURE — 87536 HIV-1 QUANT&REVRSE TRNSCRPJ: CPT

## 2025-08-20 PROCEDURE — 86780 TREPONEMA PALLIDUM: CPT

## 2025-08-20 PROCEDURE — 86708 HEPATITIS A ANTIBODY: CPT

## 2025-08-20 PROCEDURE — 80053 COMPREHEN METABOLIC PANEL: CPT

## 2025-08-20 PROCEDURE — 80061 LIPID PANEL: CPT

## 2025-08-21 LAB
AGE: 65
CD3+CD4+ CELLS # SPEC: 811 UNIT/L (ref 589–1505)
CD3+CD4+ CELLS NFR BLD: 33.4 %
LYMPHOCYTES # BLD AUTO: 2429.35 X10(3)/MCL (ref 1260–5520)
LYMPHOCYTES NFR LN MANUAL: 38.5 % (ref 28–48)
LYMPHOMA - T-CELL MARKERS SPEC-IMP: NORMAL
WBC # BLD AUTO: 6310 /MM3 (ref 4500–11500)

## 2025-08-22 LAB
GAMMA INTERFERON BACKGROUND BLD IA-ACNC: 0.03 IU/ML
M TB IFN-G BLD-IMP: NEGATIVE
M TB IFN-G CD4+ BCKGRND COR BLD-ACNC: 0.02 IU/ML
M TB IFN-G CD4+CD8+ BCKGRND COR BLD-ACNC: 0.02 IU/ML
MITOGEN IGNF BCKGRD COR BLD-ACNC: 8.95 IU/ML

## 2025-08-26 DIAGNOSIS — M62.838 MUSCLE SPASM: ICD-10-CM

## 2025-08-26 RX ORDER — CARISOPRODOL 350 MG/1
350 TABLET ORAL 2 TIMES DAILY
Qty: 60 TABLET | Refills: 0 | Status: SHIPPED | OUTPATIENT
Start: 2025-08-26

## 2025-08-28 ENCOUNTER — OFFICE VISIT (OUTPATIENT)
Dept: INFECTIOUS DISEASES | Facility: CLINIC | Age: 65
End: 2025-08-28
Payer: MEDICARE

## 2025-08-28 VITALS
HEART RATE: 49 BPM | TEMPERATURE: 98 F | HEIGHT: 69 IN | RESPIRATION RATE: 10 BRPM | BODY MASS INDEX: 19.5 KG/M2 | WEIGHT: 131.63 LBS | DIASTOLIC BLOOD PRESSURE: 62 MMHG | SYSTOLIC BLOOD PRESSURE: 111 MMHG

## 2025-08-28 DIAGNOSIS — Z21 ASYMPTOMATIC HIV INFECTION, WITH NO HISTORY OF HIV-RELATED ILLNESS: Primary | ICD-10-CM

## 2025-08-28 DIAGNOSIS — R31.21 ASYMPTOMATIC MICROSCOPIC HEMATURIA: ICD-10-CM

## 2025-08-28 PROCEDURE — 99214 OFFICE O/P EST MOD 30 MIN: CPT | Mod: PBBFAC | Performed by: NURSE PRACTITIONER

## 2025-08-28 RX ORDER — BICTEGRAVIR SODIUM, EMTRICITABINE, AND TENOFOVIR ALAFENAMIDE FUMARATE 50; 200; 25 MG/1; MG/1; MG/1
1 TABLET ORAL DAILY
Qty: 90 TABLET | Refills: 1 | Status: SHIPPED | OUTPATIENT
Start: 2025-08-28